# Patient Record
Sex: FEMALE | Race: WHITE | NOT HISPANIC OR LATINO | Employment: OTHER | URBAN - NONMETROPOLITAN AREA
[De-identification: names, ages, dates, MRNs, and addresses within clinical notes are randomized per-mention and may not be internally consistent; named-entity substitution may affect disease eponyms.]

---

## 2017-10-25 ENCOUNTER — TRANSCRIBE ORDERS (OUTPATIENT)
Dept: ADMINISTRATIVE | Facility: HOSPITAL | Age: 42
End: 2017-10-25

## 2017-10-25 DIAGNOSIS — Z12.31 VISIT FOR SCREENING MAMMOGRAM: Primary | ICD-10-CM

## 2017-11-09 ENCOUNTER — HOSPITAL ENCOUNTER (OUTPATIENT)
Dept: MAMMOGRAPHY | Facility: HOSPITAL | Age: 42
Discharge: HOME | End: 2017-11-09
Attending: FAMILY MEDICINE

## 2017-12-01 ENCOUNTER — HOSPITAL ENCOUNTER (OUTPATIENT)
Dept: MAMMOGRAPHY | Facility: HOSPITAL | Age: 42
Discharge: HOME | End: 2017-12-01
Admitting: FAMILY MEDICINE

## 2017-12-01 DIAGNOSIS — Z12.31 VISIT FOR SCREENING MAMMOGRAM: ICD-10-CM

## 2017-12-01 PROCEDURE — 77063 BREAST TOMOSYNTHESIS BI: CPT | Performed by: RADIOLOGY

## 2017-12-01 PROCEDURE — 77063 BREAST TOMOSYNTHESIS BI: CPT

## 2017-12-01 PROCEDURE — G0202 SCR MAMMO BI INCL CAD: HCPCS

## 2017-12-01 PROCEDURE — G0202 SCR MAMMO BI INCL CAD: HCPCS | Performed by: RADIOLOGY

## 2018-04-25 ENCOUNTER — TRANSCRIBE ORDERS (OUTPATIENT)
Dept: ADMINISTRATIVE | Facility: HOSPITAL | Age: 43
End: 2018-04-25

## 2018-04-25 DIAGNOSIS — M54.50 ACUTE LEFT-SIDED LOW BACK PAIN WITHOUT SCIATICA: Primary | ICD-10-CM

## 2018-07-05 ENCOUNTER — TRANSCRIBE ORDERS (OUTPATIENT)
Dept: ADMINISTRATIVE | Facility: HOSPITAL | Age: 43
End: 2018-07-05

## 2018-07-05 DIAGNOSIS — R10.84 ABDOMINAL PAIN, GENERALIZED: Primary | ICD-10-CM

## 2018-07-13 ENCOUNTER — HOSPITAL ENCOUNTER (OUTPATIENT)
Dept: CT IMAGING | Facility: HOSPITAL | Age: 43
Discharge: HOME | End: 2018-07-13
Admitting: NURSE PRACTITIONER

## 2018-07-13 DIAGNOSIS — R10.84 ABDOMINAL PAIN, GENERALIZED: ICD-10-CM

## 2018-07-13 PROCEDURE — 74170 CT ABD WO CNTRST FLWD CNTRST: CPT

## 2018-07-13 PROCEDURE — 25010000002 IOPAMIDOL 61 % SOLUTION: Performed by: NURSE PRACTITIONER

## 2018-07-13 PROCEDURE — 74170 CT ABD WO CNTRST FLWD CNTRST: CPT | Performed by: RADIOLOGY

## 2018-07-13 RX ADMIN — IOPAMIDOL 100 ML: 612 INJECTION, SOLUTION INTRAVENOUS at 14:45

## 2018-12-12 ENCOUNTER — HOSPITAL ENCOUNTER (OUTPATIENT)
Dept: MAMMOGRAPHY | Facility: HOSPITAL | Age: 43
Discharge: HOME | End: 2018-12-12
Admitting: FAMILY MEDICINE

## 2018-12-12 DIAGNOSIS — Z12.39 SCREENING BREAST EXAMINATION: ICD-10-CM

## 2018-12-12 PROCEDURE — 77063 BREAST TOMOSYNTHESIS BI: CPT | Performed by: RADIOLOGY

## 2018-12-12 PROCEDURE — 77067 SCR MAMMO BI INCL CAD: CPT | Performed by: RADIOLOGY

## 2018-12-12 PROCEDURE — 77063 BREAST TOMOSYNTHESIS BI: CPT

## 2018-12-12 PROCEDURE — 77067 SCR MAMMO BI INCL CAD: CPT

## 2019-11-14 ENCOUNTER — TRANSCRIBE ORDERS (OUTPATIENT)
Dept: ADMINISTRATIVE | Facility: HOSPITAL | Age: 44
End: 2019-11-14

## 2019-11-14 DIAGNOSIS — R07.9 CHEST PAIN, UNSPECIFIED TYPE: Primary | ICD-10-CM

## 2019-11-19 ENCOUNTER — HOSPITAL ENCOUNTER (OUTPATIENT)
Dept: CARDIOLOGY | Facility: HOSPITAL | Age: 44
Discharge: HOME | End: 2019-11-19
Admitting: FAMILY MEDICINE

## 2019-11-19 DIAGNOSIS — R07.9 CHEST PAIN, UNSPECIFIED TYPE: ICD-10-CM

## 2019-11-19 LAB
BH CV ECHO MEAS - % IVS THICK: 1.7 %
BH CV ECHO MEAS - % LVPW THICK: -0.53 %
BH CV ECHO MEAS - ACS: 1.8 CM
BH CV ECHO MEAS - AO MAX PG: 7.4 MMHG
BH CV ECHO MEAS - AO MEAN PG: 3.5 MMHG
BH CV ECHO MEAS - AO ROOT AREA (BSA CORRECTED): 1.6
BH CV ECHO MEAS - AO ROOT AREA: 7.7 CM^2
BH CV ECHO MEAS - AO ROOT DIAM: 3.1 CM
BH CV ECHO MEAS - AO V2 MAX: 135.7 CM/SEC
BH CV ECHO MEAS - AO V2 MEAN: 86.4 CM/SEC
BH CV ECHO MEAS - AO V2 VTI: 31 CM
BH CV ECHO MEAS - BSA(HAYCOCK): 2 M^2
BH CV ECHO MEAS - BSA: 1.9 M^2
BH CV ECHO MEAS - BZI_BMI: 29.1 KILOGRAMS/M^2
BH CV ECHO MEAS - BZI_METRIC_HEIGHT: 167.6 CM
BH CV ECHO MEAS - BZI_METRIC_WEIGHT: 81.6 KG
BH CV ECHO MEAS - EDV(CUBED): 42.3 ML
BH CV ECHO MEAS - EDV(MOD-SP4): 58 ML
BH CV ECHO MEAS - EDV(TEICH): 50.3 ML
BH CV ECHO MEAS - EF(CUBED): 67.8 %
BH CV ECHO MEAS - EF(MOD-SP4): 65.5 %
BH CV ECHO MEAS - EF(TEICH): 60.4 %
BH CV ECHO MEAS - ESV(CUBED): 13.6 ML
BH CV ECHO MEAS - ESV(MOD-SP4): 20 ML
BH CV ECHO MEAS - ESV(TEICH): 19.9 ML
BH CV ECHO MEAS - FS: 31.5 %
BH CV ECHO MEAS - IVS/LVPW: 0.95
BH CV ECHO MEAS - IVSD: 1.1 CM
BH CV ECHO MEAS - IVSS: 1.1 CM
BH CV ECHO MEAS - LA DIMENSION: 3 CM
BH CV ECHO MEAS - LA/AO: 0.96
BH CV ECHO MEAS - LV DIASTOLIC VOL/BSA (35-75): 30.3 ML/M^2
BH CV ECHO MEAS - LV MASS(C)D: 116 GRAMS
BH CV ECHO MEAS - LV MASS(C)DI: 60.6 GRAMS/M^2
BH CV ECHO MEAS - LV MASS(C)S: 68.8 GRAMS
BH CV ECHO MEAS - LV MASS(C)SI: 36 GRAMS/M^2
BH CV ECHO MEAS - LV SYSTOLIC VOL/BSA (12-30): 10.5 ML/M^2
BH CV ECHO MEAS - LVIDD: 3.5 CM
BH CV ECHO MEAS - LVIDS: 2.4 CM
BH CV ECHO MEAS - LVLD AP4: 6.5 CM
BH CV ECHO MEAS - LVLS AP4: 4.9 CM
BH CV ECHO MEAS - LVOT AREA (M): 2.5 CM^2
BH CV ECHO MEAS - LVOT AREA: 2.5 CM^2
BH CV ECHO MEAS - LVOT DIAM: 1.8 CM
BH CV ECHO MEAS - LVPWD: 1.1 CM
BH CV ECHO MEAS - LVPWS: 1.1 CM
BH CV ECHO MEAS - MV A MAX VEL: 65.6 CM/SEC
BH CV ECHO MEAS - MV E MAX VEL: 86.9 CM/SEC
BH CV ECHO MEAS - MV E/A: 1.3
BH CV ECHO MEAS - PA ACC SLOPE: 1665 CM/SEC^2
BH CV ECHO MEAS - PA ACC TIME: 0.09 SEC
BH CV ECHO MEAS - PA PR(ACCEL): 39.4 MMHG
BH CV ECHO MEAS - RAP SYSTOLE: 10 MMHG
BH CV ECHO MEAS - RVSP: 41.9 MMHG
BH CV ECHO MEAS - SI(AO): 125.7 ML/M^2
BH CV ECHO MEAS - SI(CUBED): 15 ML/M^2
BH CV ECHO MEAS - SI(MOD-SP4): 19.9 ML/M^2
BH CV ECHO MEAS - SI(TEICH): 15.9 ML/M^2
BH CV ECHO MEAS - SV(AO): 240.4 ML
BH CV ECHO MEAS - SV(CUBED): 28.7 ML
BH CV ECHO MEAS - SV(MOD-SP4): 38 ML
BH CV ECHO MEAS - SV(TEICH): 30.4 ML
BH CV ECHO MEAS - TR MAX VEL: 282.3 CM/SEC
MAXIMAL PREDICTED HEART RATE: 177 BPM
STRESS TARGET HR: 150 BPM

## 2019-11-19 PROCEDURE — 93306 TTE W/DOPPLER COMPLETE: CPT

## 2019-11-19 PROCEDURE — 93306 TTE W/DOPPLER COMPLETE: CPT | Performed by: INTERNAL MEDICINE

## 2019-11-21 ENCOUNTER — APPOINTMENT (OUTPATIENT)
Dept: CARDIOLOGY | Facility: HOSPITAL | Age: 44
End: 2019-11-21

## 2019-12-16 ENCOUNTER — HOSPITAL ENCOUNTER (OUTPATIENT)
Dept: MAMMOGRAPHY | Facility: HOSPITAL | Age: 44
Discharge: HOME | End: 2019-12-16
Admitting: FAMILY MEDICINE

## 2019-12-16 DIAGNOSIS — Z12.31 VISIT FOR SCREENING MAMMOGRAM: ICD-10-CM

## 2019-12-16 PROCEDURE — 77067 SCR MAMMO BI INCL CAD: CPT

## 2019-12-16 PROCEDURE — 77063 BREAST TOMOSYNTHESIS BI: CPT

## 2019-12-16 PROCEDURE — 77063 BREAST TOMOSYNTHESIS BI: CPT | Performed by: RADIOLOGY

## 2019-12-16 PROCEDURE — 77067 SCR MAMMO BI INCL CAD: CPT | Performed by: RADIOLOGY

## 2020-02-07 ENCOUNTER — OFFICE VISIT (OUTPATIENT)
Dept: SURGERY | Facility: CLINIC | Age: 45
End: 2020-02-07

## 2020-02-07 VITALS
SYSTOLIC BLOOD PRESSURE: 132 MMHG | WEIGHT: 189.6 LBS | BODY MASS INDEX: 30.47 KG/M2 | DIASTOLIC BLOOD PRESSURE: 103 MMHG | HEART RATE: 89 BPM | HEIGHT: 66 IN

## 2020-02-07 DIAGNOSIS — M79.621 AXILLARY PAIN, RIGHT: Primary | ICD-10-CM

## 2020-02-07 PROCEDURE — 76882 US LMTD JT/FCL EVL NVASC XTR: CPT | Performed by: SURGERY

## 2020-02-07 PROCEDURE — 99203 OFFICE O/P NEW LOW 30 MIN: CPT | Performed by: SURGERY

## 2020-02-07 RX ORDER — DICLOFENAC SODIUM 75 MG/1
TABLET, DELAYED RELEASE ORAL 2 TIMES DAILY
COMMUNITY
Start: 2019-11-26 | End: 2021-04-30

## 2020-02-07 RX ORDER — TRAMADOL HYDROCHLORIDE 50 MG/1
TABLET ORAL
COMMUNITY
Start: 2019-11-27 | End: 2021-05-18

## 2020-02-07 RX ORDER — BUSPIRONE HYDROCHLORIDE 5 MG/1
TABLET ORAL
COMMUNITY
Start: 2019-09-06 | End: 2021-04-30

## 2020-02-07 RX ORDER — DICYCLOMINE HCL 20 MG
TABLET ORAL 2 TIMES DAILY
COMMUNITY
Start: 2020-01-21 | End: 2021-12-28 | Stop reason: SDUPTHER

## 2020-02-07 RX ORDER — ALPRAZOLAM 1 MG/1
TABLET ORAL
COMMUNITY
Start: 2020-01-13 | End: 2021-04-30

## 2020-02-07 RX ORDER — GABAPENTIN 100 MG/1
100 CAPSULE ORAL 2 TIMES DAILY
COMMUNITY
Start: 2019-12-27 | End: 2021-04-30

## 2020-02-07 RX ORDER — BUSPIRONE HYDROCHLORIDE 5 MG/1
TABLET ORAL
COMMUNITY
Start: 2019-09-06 | End: 2020-02-07 | Stop reason: SDUPTHER

## 2020-02-07 RX ORDER — OMEPRAZOLE 20 MG/1
CAPSULE, DELAYED RELEASE ORAL
COMMUNITY
Start: 2020-01-22 | End: 2020-09-24

## 2020-02-07 NOTE — PROGRESS NOTES
Subjective   Iraida Heard is a 44 y.o. female is being seen for consultation today at the request of Dr. Bustamante for chest and arm pit pain.    History of Present Illness  Ms. Heard was seen in the office today for right axillary pain.  The patient states that this started in October.  She states that it is severe enough that she sleeps with a pillow under her axilla.  The pain occurs daily but it is not continuous.  She has not identified any trigger factors for the pain.  Specifically it is not related to movement.  She does not appreciate any axillary adenopathy.  She denies any injury to her shoulder.  Iraida had a screening mammogram bilateral on 12/16/2019 which was negative.  Patient reports no prior history of breast biopsy or cyst aspiration.  As far as risk factors go, Iraida is nulliparous, with an onset of menses at age 15.  Family history is positive for breast cancer in a paternal aunt in her 60s.  The patient had a hysterectomy but has 1 ovary remaining.  She is not on hormone replacement therapy.    No Known Allergies  Current Outpatient Medications   Medication Sig Dispense Refill   • ALPRAZolam (XANAX) 1 MG tablet TAKE 1 TABLET BY MOUTH AT NIGHT     • busPIRone (BUSPAR) 5 MG tablet TAKE 1 TABLET BY MOUTH THREE (3) TIMES DAILY     • dicyclomine (BENTYL) 20 MG tablet Take  by mouth Every 8 (Eight) Hours.     • gabapentin (NEURONTIN) 100 MG capsule take 1 capsule by oral route am and 2tab pm     • traMADol (ULTRAM) 50 MG tablet take 1 tablet by oral route every night as needed     • diclofenac (VOLTAREN) 75 MG EC tablet      • omeprazole (priLOSEC) 20 MG capsule        No current facility-administered medications for this visit.      Past Medical History:   Diagnosis Date   • Arthritis      Past Surgical History:   Procedure Laterality Date   • EXCISION BENIGN SKIN LESION SCALP / NECK / HANDS / FEET / GENITALIA     • GALLBLADDER SURGERY     • HYSTERECTOMY      36   • SINUS SURGERY       Review of  "Systems  General: negative  Integumentary: negative  Eyes: negative  ENT: negative  Respiratory: negative  Gastrointestinal: negative  Cardiovascular: chest pain  Neurological: negative  Psychiatric: negative  Hematologic/Lymphatic: swollen glands  Genitourinary: negative  Musculoskeletal: negative  Endocrine: negative  Breasts: negative        Objective   BP (!) 132/103 (BP Location: Left arm)   Pulse 89   Ht 167.6 cm (66\")   Wt 86 kg (189 lb 9.6 oz)   BMI 30.60 kg/m²   Physical Exam  General:  This is a WD WN female in no acute distress  HEENT exam:  WNL. Sclera are anicteric.  EOMI  Neck:  supple, FROM, without thyromegaly, cervical or supraclavicular adenopathy  Lungs:  Respiratory effort normal. Auscultation: Clear, without wheezes, rhonchi, rales  Heart:  Regular rate and rhythm, without murmur, gallop, rub.  No pedal edema  Breasts: On visual inspection the breasts are grossly symmetrical. Examination of the right breast demonstrates no discrete mass or skin change. Examination of the left breast demonstrates no discrete mass or skin change.  Axilla: Right axilla negative for adenopathy.  Left axilla negative for adenopathy.  Abdomen: Nontender, nondistended  Musculoskeletal:  muscle strength/tone is normal.  Gait and station: normal. No digital cyanosis  Psyc:  alert, oriented x 3.  Mood and affect are appropriate  skin:  Warm with good turgor.  Without rash or lesion  extremities:  Examination of the extremities revealed no cyanosis, clubbing or edema.  Results/Data  Mammogram reports and images were reviewed and I agree with the assessment    Procedures   Ultrasound Axilla right    Indication: Axillary pain  Description: With use of the 12.5 MHz transducer the area of clinical concern was scanned in multiple planes.  Findings: The right axilla was scanned in its entirety.  Normal lymph nodes are identified.  In the area of clinical concern patient does have some fibrocystic tissue within the axillary " tail of Adams.  However this is not any more prominent than the patient's fibrocystic tissue throughout the breast.  Impression: Negative ultrasound of the right axilla  Plan: Follow-up as clinically indicated    Assessment/Plan   Right axillary pain with no clinical or radiographic findings suspicious for malignancy    Follow-up as needed       Discussion/Summary    Patient's Body mass index is 30.6 kg/m². BMI is above normal parameters. Recommendations include: educational material.       No future appointments.      Please note that portions of this note were completed with a voice recognition program.

## 2020-07-14 ENCOUNTER — TRANSCRIBE ORDERS (OUTPATIENT)
Dept: ADMINISTRATIVE | Facility: HOSPITAL | Age: 45
End: 2020-07-14

## 2020-07-14 DIAGNOSIS — Z01.818 OTHER SPECIFIED PRE-OPERATIVE EXAMINATION: Primary | ICD-10-CM

## 2020-08-10 ENCOUNTER — TRANSCRIBE ORDERS (OUTPATIENT)
Dept: ADMINISTRATIVE | Facility: HOSPITAL | Age: 45
End: 2020-08-10

## 2020-08-10 DIAGNOSIS — Z01.818 PREOP EXAMINATION: Primary | ICD-10-CM

## 2020-08-11 ENCOUNTER — LAB (OUTPATIENT)
Dept: LAB | Facility: HOSPITAL | Age: 45
End: 2020-08-11

## 2020-08-11 DIAGNOSIS — Z01.818 PREOP EXAMINATION: ICD-10-CM

## 2020-08-11 LAB
REF LAB TEST METHOD: NORMAL
SARS-COV-2 RNA RESP QL NAA+PROBE: NOT DETECTED

## 2020-08-11 PROCEDURE — U0004 COV-19 TEST NON-CDC HGH THRU: HCPCS

## 2020-08-11 PROCEDURE — C9803 HOPD COVID-19 SPEC COLLECT: HCPCS

## 2020-08-11 PROCEDURE — U0002 COVID-19 LAB TEST NON-CDC: HCPCS

## 2020-09-24 ENCOUNTER — OFFICE VISIT (OUTPATIENT)
Dept: UROLOGY | Facility: CLINIC | Age: 45
End: 2020-09-24

## 2020-09-24 VITALS — WEIGHT: 191.4 LBS | HEIGHT: 66 IN | TEMPERATURE: 98.3 F | BODY MASS INDEX: 30.76 KG/M2

## 2020-09-24 DIAGNOSIS — R35.0 FREQUENCY OF URINATION: Primary | ICD-10-CM

## 2020-09-24 LAB
BILIRUB BLD-MCNC: NEGATIVE MG/DL
CLARITY, POC: CLEAR
COLOR UR: YELLOW
GLUCOSE UR STRIP-MCNC: NEGATIVE MG/DL
KETONES UR QL: NEGATIVE
LEUKOCYTE EST, POC: NEGATIVE
NITRITE UR-MCNC: NEGATIVE MG/ML
PH UR: 5.5 [PH] (ref 5–8)
PROT UR STRIP-MCNC: NEGATIVE MG/DL
RBC # UR STRIP: NEGATIVE /UL
SP GR UR: 1.01 (ref 1–1.03)
UROBILINOGEN UR QL: NORMAL

## 2020-09-24 PROCEDURE — 99201 PR OFFICE OUTPATIENT NEW 10 MINUTES: CPT | Performed by: UROLOGY

## 2020-09-24 PROCEDURE — 81003 URINALYSIS AUTO W/O SCOPE: CPT | Performed by: UROLOGY

## 2020-09-24 NOTE — PROGRESS NOTES
Chief Complaint:          Urinary cyst.    HPI:   45 y.o. female.    HPI  Pt voids every half hour.  She has to void 4 to 5 times before she goes to sleep.  She does not wake up to void after she falls to sleep.  Pt drinks a lot of water.    Past Medical History:        Past Medical History:   Diagnosis Date   • Arthritis          Current Meds:     Current Outpatient Medications   Medication Sig Dispense Refill   • ALPRAZolam (XANAX) 1 MG tablet TAKE 1 TABLET BY MOUTH AT NIGHT     • busPIRone (BUSPAR) 5 MG tablet TAKE 1 TABLET BY MOUTH THREE (3) TIMES DAILY     • diclofenac (VOLTAREN) 75 MG EC tablet      • dicyclomine (BENTYL) 20 MG tablet Take  by mouth Every 8 (Eight) Hours.     • gabapentin (NEURONTIN) 100 MG capsule take 1 capsule by oral route am and 2tab pm     • traMADol (ULTRAM) 50 MG tablet take 1 tablet by oral route every night as needed       No current facility-administered medications for this visit.         Allergies:      No Known Allergies     Past Surgical History:     Past Surgical History:   Procedure Laterality Date   • APPENDECTOMY     • EXCISION BENIGN SKIN LESION SCALP / NECK / HANDS / FEET / GENITALIA     • GALLBLADDER SURGERY     • HYSTERECTOMY      36   • SINUS SURGERY           Social History:     Social History     Socioeconomic History   • Marital status:      Spouse name: Not on file   • Number of children: Not on file   • Years of education: Not on file   • Highest education level: Not on file   Tobacco Use   • Smoking status: Never Smoker   • Smokeless tobacco: Never Used   Substance and Sexual Activity   • Alcohol use: Never     Frequency: Never   • Drug use: Never   • Sexual activity: Defer       Family History:     Family History   Problem Relation Age of Onset   • Breast cancer Maternal Grandmother         60's   • Cancer Mother         kidney   • Diabetes Mother    • Hypertension Father    • Heart disease Father        Review of Systems:     Review of Systems      Constitutional: Positive for fatigue. Negative for chills and fever.   HENT: Positive for sinus pressure. Negative for congestion.    Eyes: Negative for pain and redness.   Respiratory: Positive for chest tightness. Negative for shortness of breath.    Cardiovascular: Negative for chest pain.   Gastrointestinal: Positive for abdominal pain and diarrhea. Negative for constipation, nausea and vomiting.   Genitourinary: Positive for flank pain and frequency. Negative for dysuria, hematuria and urgency.   Musculoskeletal: Positive for back pain. Negative for neck pain.   Skin: Negative for rash.   Allergic/Immunologic: Negative for food allergies.   Neurological: Negative for dizziness and headaches.   Hematological: Does not bruise/bleed easily.   Psychiatric/Behavioral: The patient is nervous/anxious.          Physical Exam:     Physical Exam  Constitutional:       Appearance: She is well-developed.   HENT:      Head: Normocephalic.      Right Ear: External ear normal.      Left Ear: External ear normal.      Nose: Nose normal.   Eyes:      Conjunctiva/sclera: Conjunctivae normal.      Pupils: Pupils are equal, round, and reactive to light.   Neck:      Musculoskeletal: Normal range of motion and neck supple.      Thyroid: No thyromegaly.      Trachea: No tracheal deviation.   Cardiovascular:      Heart sounds: Normal heart sounds. No murmur. No friction rub. No gallop.    Pulmonary:      Effort: Pulmonary effort is normal. No respiratory distress.      Breath sounds: Normal breath sounds. No wheezing or rales.   Chest:      Chest wall: No tenderness.   Abdominal:      General: Bowel sounds are normal. There is no distension.      Palpations: Abdomen is soft. There is no mass.      Tenderness: There is no abdominal tenderness. There is no guarding or rebound.      Hernia: No hernia is present.   Genitourinary:     Vagina: Normal.   Musculoskeletal: Normal range of motion.   Lymphadenopathy:      Cervical: No  "cervical adenopathy.   Skin:     General: Skin is warm.      Findings: No rash.   Neurological:      Mental Status: She is alert and oriented to person, place, and time.      Cranial Nerves: No cranial nerve deficit.      Motor: No abnormal muscle tone.      Coordination: Coordination normal.      Deep Tendon Reflexes: Reflexes normal.   Psychiatric:         Judgment: Judgment normal.         Temp 98.3 °F (36.8 °C)   Ht 167.6 cm (66\")   Wt 86.8 kg (191 lb 6.4 oz)   BMI 30.89 kg/m²    Procedure:         Assessment:     Encounter Diagnosis   Name Primary?   • Frequency of urination Yes       Orders Placed This Encounter   Procedures   • POC Urinalysis Dipstick, Automated       Patient reports that she is not currently experiencing any symptoms of urinary incontinence.      Plan:   Will have pt return with a voiding diary of at least 6 days.  Will see her in 6 weeks.    Patient's Body mass index is 30.89 kg/m². BMI is within normal parameters. No follow-up required..      Smoking Cessation Counseling:  Never a smoker.  Patient does not currently use any tobacco products.     Counseling was given to patient for the following topics impressions as follows: frequency. The interim medical history and current results were reviewed.  A treatment plan with follow-up was made for Frequency of urination [R35.0].   This document has been electronically signed by Anthony Sandoval MD September 24, 2020 14:49 EDT      "

## 2020-10-06 ENCOUNTER — OFFICE VISIT (OUTPATIENT)
Dept: UROLOGY | Facility: CLINIC | Age: 45
End: 2020-10-06

## 2020-10-06 VITALS — TEMPERATURE: 98.6 F | WEIGHT: 191.36 LBS | HEIGHT: 66 IN | BODY MASS INDEX: 30.75 KG/M2

## 2020-10-06 DIAGNOSIS — R35.0 FREQUENCY OF URINATION: Primary | ICD-10-CM

## 2020-10-06 DIAGNOSIS — R35.89 POLYURIA: ICD-10-CM

## 2020-10-06 LAB
BILIRUB BLD-MCNC: NEGATIVE MG/DL
CLARITY, POC: CLEAR
COLOR UR: YELLOW
GLUCOSE UR STRIP-MCNC: NEGATIVE MG/DL
KETONES UR QL: NEGATIVE
LEUKOCYTE EST, POC: NEGATIVE
NITRITE UR-MCNC: NEGATIVE MG/ML
PH UR: 5 [PH] (ref 5–8)
PROT UR STRIP-MCNC: NEGATIVE MG/DL
RBC # UR STRIP: NEGATIVE /UL
SP GR UR: 1.01 (ref 1–1.03)
UROBILINOGEN UR QL: NORMAL

## 2020-10-06 PROCEDURE — 81003 URINALYSIS AUTO W/O SCOPE: CPT | Performed by: UROLOGY

## 2020-10-06 PROCEDURE — 99214 OFFICE O/P EST MOD 30 MIN: CPT | Performed by: UROLOGY

## 2020-10-06 NOTE — PROGRESS NOTES
Chief Complaint:          frequency    HPI:   45 y.o. female.  Pt voided usually 300 cc to 400 cc a time and she made 4600 cc to 2950 cc daily.  She has normal bladder capacity but she has excessive polyuria.  HPI      Past Medical History:        Past Medical History:   Diagnosis Date   • Arthritis          Current Meds:     Current Outpatient Medications   Medication Sig Dispense Refill   • ALPRAZolam (XANAX) 1 MG tablet TAKE 1 TABLET BY MOUTH AT NIGHT     • busPIRone (BUSPAR) 5 MG tablet TAKE 1 TABLET BY MOUTH THREE (3) TIMES DAILY     • diclofenac (VOLTAREN) 75 MG EC tablet      • dicyclomine (BENTYL) 20 MG tablet Take  by mouth Every 8 (Eight) Hours.     • gabapentin (NEURONTIN) 100 MG capsule take 1 capsule by oral route am and 2tab pm     • traMADol (ULTRAM) 50 MG tablet take 1 tablet by oral route every night as needed       No current facility-administered medications for this visit.         Allergies:      No Known Allergies     Past Surgical History:     Past Surgical History:   Procedure Laterality Date   • APPENDECTOMY     • EXCISION BENIGN SKIN LESION SCALP / NECK / HANDS / FEET / GENITALIA     • GALLBLADDER SURGERY     • HYSTERECTOMY      36   • SINUS SURGERY           Social History:     Social History     Socioeconomic History   • Marital status:      Spouse name: Not on file   • Number of children: Not on file   • Years of education: Not on file   • Highest education level: Not on file   Tobacco Use   • Smoking status: Never Smoker   • Smokeless tobacco: Never Used   Substance and Sexual Activity   • Alcohol use: Never     Frequency: Never   • Drug use: Never   • Sexual activity: Defer       Family History:     Family History   Problem Relation Age of Onset   • Breast cancer Maternal Grandmother         60's   • Cancer Mother         kidney   • Diabetes Mother    • Hypertension Father    • Heart disease Father        Review of Systems:     Review of Systems   HENT: Negative for ear discharge  "and rhinorrhea.    Respiratory: Negative for choking and stridor.    Cardiovascular: Negative for chest pain and palpitations.   Gastrointestinal: Negative for abdominal distention and constipation.   Genitourinary: Negative for flank pain and pelvic pain.   Musculoskeletal: Negative for back pain.   Neurological: Negative for dizziness.   Psychiatric/Behavioral: Negative for agitation.             I have reviewed the follow portions of the patient's history and confirmed they are accurate today:  allergies, current medications, past family history, past medical history, past social history, past surgical history, problem list and ROS  Physical Exam:     Physical Exam    Temp 98.6 °F (37 °C)   Ht 167.6 cm (65.98\")   Wt 86.8 kg (191 lb 5.8 oz)   BMI 30.90 kg/m²    Procedure:         Assessment:     Encounter Diagnoses   Name Primary?   • Frequency of urination Yes   • Polyuria        Orders Placed This Encounter   Procedures   • US Renal Bilateral     Standing Status:   Future     Standing Expiration Date:   10/6/2021     Order Specific Question:   Reason for Exam:     Answer:   polyuria of 4.6 liters a day.   • Comprehensive Metabolic Panel   • POC Urinalysis Dipstick, Automated       Patient reports that she is not currently experiencing any symptoms of urinary incontinence.      Plan:   Will refer pt to nephrologist for possible diabetes insipidus or psychogenic polydipsia or nephrogenic diabetes insipidus.  Pt has a 24 hour urinary volume of 3000 cc to 4600 cc over multiple days.   Will have pt limit water intake to 1 liter a day and keep records.  Will see her back in a month.  Will also check a renal ultrasound and cmp      Patient's Body mass index is 30.9 kg/m². BMI is within normal parameters. No follow-up required..      Smoking Cessation Counseling:  Never a smoker.  Patient does not currently use any tobacco products.     Counseling was given to patient for the following topics instructions for " management as follows: fluid restiction test.. The interim medical history and current results were reviewed.  A treatment plan with follow-up was made for Frequency of urination [R35.0]. I spent 28 minutes face to face with Iraida Heard and 80 percentage was spent in counseling.       This document has been electronically signed by Anthony Sandoval MD October 7, 2020 10:03 EDT

## 2020-10-13 ENCOUNTER — HOSPITAL ENCOUNTER (OUTPATIENT)
Dept: ULTRASOUND IMAGING | Facility: HOSPITAL | Age: 45
Discharge: HOME | End: 2020-10-13
Admitting: UROLOGY

## 2020-10-13 DIAGNOSIS — R35.89 POLYURIA: ICD-10-CM

## 2020-10-13 PROCEDURE — 76775 US EXAM ABDO BACK WALL LIM: CPT

## 2020-10-13 PROCEDURE — 76775 US EXAM ABDO BACK WALL LIM: CPT | Performed by: RADIOLOGY

## 2020-10-14 ENCOUNTER — OFFICE VISIT (OUTPATIENT)
Dept: UROLOGY | Facility: CLINIC | Age: 45
End: 2020-10-14

## 2020-10-14 VITALS — BODY MASS INDEX: 30.95 KG/M2 | WEIGHT: 192.6 LBS | TEMPERATURE: 99.7 F | HEIGHT: 66 IN

## 2020-10-14 DIAGNOSIS — R35.89 POLYURIA: Primary | ICD-10-CM

## 2020-10-14 PROCEDURE — 99213 OFFICE O/P EST LOW 20 MIN: CPT | Performed by: UROLOGY

## 2020-10-14 NOTE — PROGRESS NOTES
Chief Complaint:          frequency    HPI:   45 y.o. female.  Pt has decreased her frequency and he amount of urine to usually between 1 to 2.5 liters a day.  HPI  Pt's ultrasound was rambo.    Past Medical History:        Past Medical History:   Diagnosis Date   • Arthritis          Current Meds:     Current Outpatient Medications   Medication Sig Dispense Refill   • ALPRAZolam (XANAX) 1 MG tablet TAKE 1 TABLET BY MOUTH AT NIGHT     • busPIRone (BUSPAR) 5 MG tablet TAKE 1 TABLET BY MOUTH THREE (3) TIMES DAILY     • diclofenac (VOLTAREN) 75 MG EC tablet      • dicyclomine (BENTYL) 20 MG tablet Take  by mouth Every 8 (Eight) Hours.     • gabapentin (NEURONTIN) 100 MG capsule take 1 capsule by oral route am and 2tab pm     • traMADol (ULTRAM) 50 MG tablet take 1 tablet by oral route every night as needed       No current facility-administered medications for this visit.         Allergies:      No Known Allergies     Past Surgical History:     Past Surgical History:   Procedure Laterality Date   • APPENDECTOMY     • EXCISION BENIGN SKIN LESION SCALP / NECK / HANDS / FEET / GENITALIA     • GALLBLADDER SURGERY     • HYSTERECTOMY      36   • SINUS SURGERY           Social History:     Social History     Socioeconomic History   • Marital status:      Spouse name: Not on file   • Number of children: Not on file   • Years of education: Not on file   • Highest education level: Not on file   Tobacco Use   • Smoking status: Never Smoker   • Smokeless tobacco: Never Used   Substance and Sexual Activity   • Alcohol use: Never     Frequency: Never   • Drug use: Never   • Sexual activity: Defer       Family History:     Family History   Problem Relation Age of Onset   • Breast cancer Maternal Grandmother         60's   • Cancer Mother         kidney   • Diabetes Mother    • Hypertension Father    • Heart disease Father        Review of Systems:     Review of Systems   Constitutional: Positive for fatigue. Negative for  chills and fever.   HENT: Negative for congestion and sinus pressure.    Respiratory: Negative for chest tightness and shortness of breath.    Cardiovascular: Negative for chest pain.   Gastrointestinal: Negative for abdominal pain, constipation, diarrhea, nausea and vomiting.   Genitourinary: Positive for frequency. Negative for dysuria, hematuria and urgency.   Musculoskeletal: Negative for back pain and neck pain.   Neurological: Negative for dizziness and headaches.   Hematological: Does not bruise/bleed easily.   Psychiatric/Behavioral: The patient is not nervous/anxious.          Physical Exam:     Physical Exam  Constitutional:       Appearance: She is well-developed.   HENT:      Head: Normocephalic.      Right Ear: External ear normal.      Left Ear: External ear normal.      Nose: Nose normal.   Eyes:      Conjunctiva/sclera: Conjunctivae normal.      Pupils: Pupils are equal, round, and reactive to light.   Neck:      Musculoskeletal: Normal range of motion and neck supple.      Thyroid: No thyromegaly.      Trachea: No tracheal deviation.   Cardiovascular:      Heart sounds: Normal heart sounds. No murmur. No friction rub. No gallop.    Pulmonary:      Effort: Pulmonary effort is normal. No respiratory distress.      Breath sounds: Normal breath sounds. No wheezing or rales.   Chest:      Chest wall: No tenderness.   Abdominal:      General: Bowel sounds are normal. There is no distension.      Palpations: Abdomen is soft. There is no mass.      Tenderness: There is no abdominal tenderness. There is no guarding or rebound.      Hernia: No hernia is present.   Genitourinary:     Vagina: Normal.   Musculoskeletal: Normal range of motion.   Lymphadenopathy:      Cervical: No cervical adenopathy.   Skin:     General: Skin is warm.      Findings: No rash.   Neurological:      Mental Status: She is alert and oriented to person, place, and time.      Cranial Nerves: No cranial nerve deficit.      Motor: No  "abnormal muscle tone.      Coordination: Coordination normal.      Deep Tendon Reflexes: Reflexes normal.   Psychiatric:         Judgment: Judgment normal.         Temp 99.7 °F (37.6 °C)   Ht 167.6 cm (65.98\")   Wt 87.4 kg (192 lb 9.6 oz)   BMI 31.11 kg/m²    Procedure:         Assessment:     Encounter Diagnosis   Name Primary?   • Polyuria Yes       No orders of the defined types were placed in this encounter.      Patient reports that she is not currently experiencing any symptoms of urinary incontinence.      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  I would have her continue restricting water to 1 liter a day and will see pt back in 6 monts.    Patient's Body mass index is 31.11 kg/m². BMI is within normal parameters. No follow-up required..      Smoking Cessation Counseling:  Never a smoker.  Patient does not currently use any tobacco products.     Counseling was given to patient for the following topics instructions for management as follows: polyuria. The interim medical history and current results were reviewed.  A treatment plan with follow-up was made for Polyuria [R35.8]. I spent 21 minutes face to face with Iraida Heard and 80 percentage was spent in counseling.       This document has been electronically signed by Anthony Sandoval MD October 14, 2020 13:09 EDT      "

## 2021-01-22 ENCOUNTER — HOSPITAL ENCOUNTER (OUTPATIENT)
Dept: MAMMOGRAPHY | Facility: HOSPITAL | Age: 46
Discharge: HOME | End: 2021-01-22
Admitting: NURSE PRACTITIONER

## 2021-01-22 DIAGNOSIS — Z12.31 VISIT FOR SCREENING MAMMOGRAM: ICD-10-CM

## 2021-01-22 PROCEDURE — 77063 BREAST TOMOSYNTHESIS BI: CPT | Performed by: RADIOLOGY

## 2021-01-22 PROCEDURE — 77067 SCR MAMMO BI INCL CAD: CPT | Performed by: RADIOLOGY

## 2021-01-22 PROCEDURE — 77067 SCR MAMMO BI INCL CAD: CPT

## 2021-01-22 PROCEDURE — 77063 BREAST TOMOSYNTHESIS BI: CPT

## 2021-01-26 ENCOUNTER — HOSPITAL ENCOUNTER (OUTPATIENT)
Dept: ULTRASOUND IMAGING | Facility: HOSPITAL | Age: 46
Discharge: HOME | End: 2021-01-26
Admitting: RADIOLOGY

## 2021-01-26 DIAGNOSIS — R92.8 ABNORMAL MAMMOGRAM: ICD-10-CM

## 2021-01-26 PROCEDURE — 76642 ULTRASOUND BREAST LIMITED: CPT

## 2021-01-26 PROCEDURE — 76642 ULTRASOUND BREAST LIMITED: CPT | Performed by: RADIOLOGY

## 2021-02-09 ENCOUNTER — OFFICE VISIT (OUTPATIENT)
Dept: CARDIOLOGY | Facility: CLINIC | Age: 46
End: 2021-02-09

## 2021-02-09 VITALS
SYSTOLIC BLOOD PRESSURE: 128 MMHG | DIASTOLIC BLOOD PRESSURE: 90 MMHG | RESPIRATION RATE: 16 BRPM | TEMPERATURE: 97.1 F | HEIGHT: 66 IN | HEART RATE: 84 BPM | WEIGHT: 192.6 LBS | BODY MASS INDEX: 30.95 KG/M2

## 2021-02-09 DIAGNOSIS — R00.2 PALPITATIONS: ICD-10-CM

## 2021-02-09 DIAGNOSIS — I10 HYPERTENSION, UNSPECIFIED TYPE: ICD-10-CM

## 2021-02-09 DIAGNOSIS — R07.2 PRECORDIAL CHEST PAIN: Primary | ICD-10-CM

## 2021-02-09 DIAGNOSIS — G47.33 OSA (OBSTRUCTIVE SLEEP APNEA): ICD-10-CM

## 2021-02-09 PROCEDURE — 93000 ELECTROCARDIOGRAM COMPLETE: CPT | Performed by: SPECIALIST

## 2021-02-09 PROCEDURE — 99204 OFFICE O/P NEW MOD 45 MIN: CPT | Performed by: SPECIALIST

## 2021-02-09 RX ORDER — L.ACID,CASEI/B.ANIMAL/S.THERMO 16B CELL
1 CAPSULE ORAL DAILY
COMMUNITY
End: 2022-06-06

## 2021-02-09 RX ORDER — AMOXICILLIN 875 MG/1
875 TABLET, COATED ORAL 2 TIMES DAILY
COMMUNITY
End: 2021-02-23

## 2021-02-09 RX ORDER — OMEPRAZOLE 20 MG/1
20 CAPSULE, DELAYED RELEASE ORAL DAILY
COMMUNITY
End: 2021-05-06 | Stop reason: SDUPTHER

## 2021-02-09 RX ORDER — ESTRADIOL 1 MG/1
1 TABLET ORAL DAILY
COMMUNITY
End: 2021-04-30

## 2021-02-09 RX ORDER — LANOLIN ALCOHOL/MO/W.PET/CERES
1000 CREAM (GRAM) TOPICAL DAILY
COMMUNITY
End: 2021-05-18

## 2021-02-09 NOTE — PROGRESS NOTES
Subjective   Initial consultation, chest pain  Iraida eHard is a 45 y.o. female who presents to day for Chest Pain (rated 6-8, sharp), Shortness of Breath (routine activity), and Med Management (list provided).    CHIEF COMPLIANT  Chief Complaint   Patient presents with   • Chest Pain     rated 6-8, sharp   • Shortness of Breath     routine activity   • Med Management     list provided       Active Problems:  Problem List Items Addressed This Visit        Cardiac and Vasculature    Precordial chest pain - Primary    Palpitations    Hypertension       Sleep    KATALINA (obstructive sleep apnea)          HPI  HPI  For about a year, with intermittent retrosternal chest pain, on and off, sharp, not radiates, some times prolonged, no relations to worse with exertion, specially walking uphill, nothing else makes it worse, nothing makes it better,no associated symptoms, happens at least once a week, no shortness of breath, no edema, occasional episodes of palpitations, waking up, with fast heart beat and gasping for air, had sleep study done 2 days ago, result is not available, never smoke, no hypertension, no diabetes, her father had CABG and PCI, started in his 50`s  PRIOR MEDS  Current Outpatient Medications on File Prior to Visit   Medication Sig Dispense Refill   • ALPRAZolam (XANAX) 1 MG tablet TAKE 1 TABLET BY MOUTH AT NIGHT     • amoxicillin (AMOXIL) 875 MG tablet Take 875 mg by mouth 2 (Two) Times a Day.     • busPIRone (BUSPAR) 5 MG tablet TAKE 1 TABLET BY MOUTH THREE (3) TIMES DAILY     • Cranberry 125 MG tablet Take  by mouth Daily.     • diclofenac (VOLTAREN) 75 MG EC tablet      • dicyclomine (BENTYL) 20 MG tablet Take  by mouth Every 8 (Eight) Hours.     • estradiol (ESTRACE) 1 MG tablet Take 1 mg by mouth Daily.     • gabapentin (NEURONTIN) 100 MG capsule 100 mg 2 (two) times a day. Takes 1cap. A.m. 2 caps p.m.     • omeprazole (priLOSEC) 20 MG capsule Take 20 mg by mouth Daily.     • Probiotic Product  "(Risaquad-2) capsule capsule Take 1 capsule by mouth Daily.     • traMADol (ULTRAM) 50 MG tablet take 1 tablet by oral route every night as needed     • vitamin B-12 (CYANOCOBALAMIN) 1000 MCG tablet Take 1,000 mcg by mouth Daily.       No current facility-administered medications on file prior to visit.        ALLERGIES  Patient has no known allergies.    HISTORY  Past Medical History:   Diagnosis Date   • Arthritis    • Hypertension 2/9/2021       Social History     Socioeconomic History   • Marital status:      Spouse name: Not on file   • Number of children: Not on file   • Years of education: Not on file   • Highest education level: Not on file   Tobacco Use   • Smoking status: Never Smoker   • Smokeless tobacco: Never Used   Substance and Sexual Activity   • Alcohol use: Never     Frequency: Never   • Drug use: Never   • Sexual activity: Defer       Family History   Problem Relation Age of Onset   • Breast cancer Maternal Grandmother         60's   • Cancer Mother         kidney   • Diabetes Mother    • Hypertension Father    • Heart disease Father        Review of Systems   Constitutional: Negative for activity change and appetite change.   HENT: Positive for congestion.    Eyes: Negative for discharge and itching.   Respiratory: Positive for chest tightness. Negative for apnea, cough, choking, shortness of breath, wheezing and stridor.    Cardiovascular: Positive for chest pain. Negative for palpitations and leg swelling.   Gastrointestinal: Positive for constipation. Negative for abdominal pain and blood in stool.   Genitourinary: Positive for frequency.   Musculoskeletal: Positive for arthralgias and back pain.   Skin: Negative for color change and pallor.   Neurological: Positive for headaches.   Psychiatric/Behavioral: Negative for agitation and behavioral problems.       Objective     VITALS: /90 (BP Location: Left arm)   Pulse 84   Temp 97.1 °F (36.2 °C)   Resp 16   Ht 167.6 cm (66\")   " Wt 87.4 kg (192 lb 9.6 oz)   BMI 31.09 kg/m²     LABS:   Lab Results (most recent)     None          IMAGING:   Us Renal Bilateral    Result Date: 10/14/2020  Unremarkable sonographic appearance of the kidneys.  This report was finalized on 10/14/2020 9:31 AM by Dr. Dontae Troy MD.      Us Breast Right Limited    Result Date: 1/26/2021  Benign right breast imaging findings.  FINAL BI-RADS PATIENT ASSESSMENT CATEGORY: 2, BENIGN  RECOMMENDATION:  Recommend the patient resume routine annual screening mammography.  The patient was notified of the results and recommendations at the time of her visit.  Additionally, a letter, in lay terminology, with the results of this exam will be mailed to the patient.  This report was finalized on 1/26/2021 10:11 AM by Dr. Migdalia Alvarez MD.      Mammo Screening Digital Tomosynthesis Bilateral With Cad    Result Date: 1/22/2021  1. Stable mammographic appearance of the left breast with no findings suspicious for malignancy. 2. Oval mass in the right subareolar region. Recommend additional imaging.   BI-RADS CATEGORY:  0, INCOMPLETE: Need additional imaging evaluation.  RECOMMENDATION:  Focused ultrasound evaluation of the right breast.  CAD was utilized.  The standard false-negative rate of mammography is between 10% and 25%. Complex patterns or increased breast density will markedly elevate the false-negative rate of mammography.    A letter, in lay terminology, with the results of this exam will be mailed to the patient.   The patient will be contacted by our office to schedule for the additional imaging evaluation.   This report was finalized on 1/22/2021 2:03 PM by Dr. Migdalia Alvarez MD.        EXAM:  Physical Exam  Vitals signs reviewed.   Constitutional:       Appearance: She is well-developed.   HENT:      Head: Normocephalic and atraumatic.   Eyes:      Pupils: Pupils are equal, round, and reactive to light.   Neck:      Musculoskeletal: Neck supple.      Thyroid: No  thyromegaly.      Vascular: No JVD.   Cardiovascular:      Rate and Rhythm: Normal rate and regular rhythm.      Heart sounds: Normal heart sounds. No murmur. No friction rub. No gallop.    Pulmonary:      Effort: Pulmonary effort is normal. No respiratory distress.      Breath sounds: Normal breath sounds. No stridor. No wheezing or rales.   Chest:      Chest wall: No tenderness.   Musculoskeletal:         General: No tenderness or deformity.   Skin:     General: Skin is warm and dry.   Neurological:      Mental Status: She is alert and oriented to person, place, and time.      Cranial Nerves: No cranial nerve deficit.      Coordination: Coordination normal.         Procedure     ECG 12 Lead    Date/Time: 2/9/2021 8:56 AM  Performed by: Shilpa Mcgraw MD  Authorized by: Shilpa Mcgraw MD           EKG: NSR, otherwise unremarkable EKG, no previous EKG available for comparison       Assessment/Plan    Diagnosis Plan   1. Precordial chest pain     2. KATALINA (obstructive sleep apnea)     3. Palpitations     4. Hypertension, unspecified type     1. With typical and atypical features, will proceed with stress testing to assess for ischemia, will get echocardiogram to assess cardiac function, wall motion and valve morphology  2.  Mildly elevated diastolic pressure, will monitor for now  3. Will get lipid profile  4. Pending sleep study result  6. Will get event monitor  No follow-ups on file.    Diagnoses and all orders for this visit:    1. Precordial chest pain (Primary)    2. KATALINA (obstructive sleep apnea)    3. Palpitations    4. Hypertension, unspecified type    Other orders  -     ECG 12 Lead                 MEDS ORDERED DURING VISIT:  No orders of the defined types were placed in this encounter.        This document has been electronically signed by Shilpa Mcgraw MD  February 9, 2021 09:43 EST

## 2021-02-10 ENCOUNTER — TELEPHONE (OUTPATIENT)
Dept: CARDIOLOGY | Facility: CLINIC | Age: 46
End: 2021-02-10

## 2021-02-10 NOTE — TELEPHONE ENCOUNTER
Called pt to advise them they can come by the office any day in between 8 am and 11 am to have there monitor put on or we can mail it. If they want it mailed please confirm their address.       Pt is coming on Monday.

## 2021-02-17 ENCOUNTER — CLINICAL SUPPORT (OUTPATIENT)
Dept: CARDIOLOGY | Facility: CLINIC | Age: 46
End: 2021-02-17

## 2021-02-23 ENCOUNTER — OFFICE VISIT (OUTPATIENT)
Dept: CARDIOLOGY | Facility: CLINIC | Age: 46
End: 2021-02-23

## 2021-02-23 VITALS — DIASTOLIC BLOOD PRESSURE: 85 MMHG | SYSTOLIC BLOOD PRESSURE: 143 MMHG | HEART RATE: 80 BPM | TEMPERATURE: 97.8 F

## 2021-02-23 DIAGNOSIS — R00.2 PALPITATIONS: ICD-10-CM

## 2021-02-23 DIAGNOSIS — R00.0 WIDE-COMPLEX TACHYCARDIA: Primary | ICD-10-CM

## 2021-02-23 DIAGNOSIS — G47.33 OSA (OBSTRUCTIVE SLEEP APNEA): ICD-10-CM

## 2021-02-23 DIAGNOSIS — I10 ESSENTIAL HYPERTENSION: ICD-10-CM

## 2021-02-23 PROCEDURE — 99214 OFFICE O/P EST MOD 30 MIN: CPT | Performed by: SPECIALIST

## 2021-02-23 RX ORDER — METOPROLOL TARTRATE 50 MG/1
50 TABLET, FILM COATED ORAL 2 TIMES DAILY
Qty: 60 TABLET | Refills: 11 | Status: SHIPPED | OUTPATIENT
Start: 2021-02-23 | End: 2021-03-09

## 2021-02-23 NOTE — PROGRESS NOTES
Subjective   Follow up, wide complex tachycardia  Iraida Heard is a 45 y.o. female who presents to day for Follow-up (ABNORMAL EVENT READING) and Med Management.    CHIEF COMPLIANT  Chief Complaint   Patient presents with   • Follow-up     ABNORMAL EVENT READING   • Med Management       Active Problems:  Problem List Items Addressed This Visit        Cardiac and Vasculature    Palpitations    Relevant Orders    Comprehensive Metabolic Panel    Hypertension    Relevant Medications    metoprolol tartrate (LOPRESSOR) 50 MG tablet    Wide-complex tachycardia (CMS/HCC) - Primary    Relevant Medications    metoprolol tartrate (LOPRESSOR) 50 MG tablet    Other Relevant Orders    Comprehensive Metabolic Panel    NMR LipoProfile    C-reactive Protein    Adult Transthoracic Echo Complete w/ Color, Spectral and Contrast if necessary per protocol       Sleep    KATALINA (obstructive sleep apnea)          HPI  HPI  Patient is called in because of episodes of WCT, she feels tired all the time, still with palpitations on and off, almost dialy, no dizziness, never syncope, no edema, stable shortness of breath on moderate exertion, with chest pain almost daily, not related to exertion, can last for hours  PRIOR MEDS  Current Outpatient Medications on File Prior to Visit   Medication Sig Dispense Refill   • ALPRAZolam (XANAX) 1 MG tablet TAKE 1 TABLET BY MOUTH AT NIGHT     • busPIRone (BUSPAR) 5 MG tablet TAKE 1 TABLET BY MOUTH THREE (3) TIMES DAILY     • Cranberry 125 MG tablet Take  by mouth Daily.     • diclofenac (VOLTAREN) 75 MG EC tablet      • dicyclomine (BENTYL) 20 MG tablet Take  by mouth Every 8 (Eight) Hours.     • estradiol (ESTRACE) 1 MG tablet Take 1 mg by mouth Daily.     • gabapentin (NEURONTIN) 100 MG capsule 100 mg 2 (two) times a day. Takes 1cap. A.m. 2 caps p.m.     • omeprazole (priLOSEC) 20 MG capsule Take 20 mg by mouth Daily.     • Probiotic Product (Risaquad-2) capsule capsule Take 1 capsule by mouth Daily.       • traMADol (ULTRAM) 50 MG tablet take 1 tablet by oral route every night as needed     • vitamin B-12 (CYANOCOBALAMIN) 1000 MCG tablet Take 1,000 mcg by mouth Daily.     • [DISCONTINUED] amoxicillin (AMOXIL) 875 MG tablet Take 875 mg by mouth 2 (Two) Times a Day.       No current facility-administered medications on file prior to visit.        ALLERGIES  Patient has no known allergies.    HISTORY  Past Medical History:   Diagnosis Date   • Arthritis    • Hypertension 2/9/2021       Social History     Socioeconomic History   • Marital status:      Spouse name: Not on file   • Number of children: Not on file   • Years of education: Not on file   • Highest education level: Not on file   Tobacco Use   • Smoking status: Never Smoker   • Smokeless tobacco: Never Used   Substance and Sexual Activity   • Alcohol use: Never     Frequency: Never   • Drug use: Never   • Sexual activity: Defer       Family History   Problem Relation Age of Onset   • Breast cancer Maternal Grandmother         60's   • Cancer Mother         kidney   • Diabetes Mother    • Hypertension Father    • Heart disease Father        Review of Systems   Constitutional: Negative for activity change and appetite change.   HENT: Negative for congestion.    Eyes: Negative for discharge and itching.   Respiratory: Positive for chest tightness and shortness of breath. Negative for apnea, cough, choking and stridor.    Cardiovascular: Positive for palpitations. Negative for chest pain and leg swelling.   Gastrointestinal: Negative for abdominal distention and abdominal pain.   Endocrine: Negative for cold intolerance and heat intolerance.   Genitourinary: Negative for flank pain.   Musculoskeletal: Negative for gait problem.   Skin: Negative for color change and pallor.   Neurological: Negative for dizziness.   Psychiatric/Behavioral: Negative for agitation and behavioral problems.       Objective     VITALS: /85   Pulse 80   Temp 97.8 °F (36.6  °C)     LABS:   Lab Results (most recent)     None          IMAGING:   Us Breast Right Limited    Result Date: 1/26/2021  Benign right breast imaging findings.  FINAL BI-RADS PATIENT ASSESSMENT CATEGORY: 2, BENIGN  RECOMMENDATION:  Recommend the patient resume routine annual screening mammography.  The patient was notified of the results and recommendations at the time of her visit.  Additionally, a letter, in lay terminology, with the results of this exam will be mailed to the patient.  This report was finalized on 1/26/2021 10:11 AM by Dr. Migdalia Alvarez MD.      Mammo Screening Digital Tomosynthesis Bilateral With Cad    Result Date: 1/22/2021  1. Stable mammographic appearance of the left breast with no findings suspicious for malignancy. 2. Oval mass in the right subareolar region. Recommend additional imaging.   BI-RADS CATEGORY:  0, INCOMPLETE: Need additional imaging evaluation.  RECOMMENDATION:  Focused ultrasound evaluation of the right breast.  CAD was utilized.  The standard false-negative rate of mammography is between 10% and 25%. Complex patterns or increased breast density will markedly elevate the false-negative rate of mammography.    A letter, in lay terminology, with the results of this exam will be mailed to the patient.   The patient will be contacted by our office to schedule for the additional imaging evaluation.   This report was finalized on 1/22/2021 2:03 PM by Dr. Migdalia Alvarez MD.        EXAM:  Physical Exam  Vitals signs reviewed.   Constitutional:       Appearance: She is well-developed.   HENT:      Head: Normocephalic and atraumatic.   Eyes:      Pupils: Pupils are equal, round, and reactive to light.   Neck:      Musculoskeletal: Neck supple.      Thyroid: No thyromegaly.      Vascular: No JVD.   Cardiovascular:      Rate and Rhythm: Normal rate and regular rhythm.      Heart sounds: Normal heart sounds. No murmur. No friction rub. No gallop.    Pulmonary:      Effort:  Pulmonary effort is normal. No respiratory distress.      Breath sounds: Normal breath sounds. No stridor. No wheezing or rales.   Chest:      Chest wall: No tenderness.   Musculoskeletal:         General: No tenderness or deformity.   Skin:     General: Skin is warm and dry.   Neurological:      Mental Status: She is alert and oriented to person, place, and time.      Cranial Nerves: No cranial nerve deficit.      Coordination: Coordination normal.         Procedure   Procedures       Assessment/Plan    Diagnosis Plan   1. Wide-complex tachycardia (CMS/HCC)  metoprolol tartrate (LOPRESSOR) 50 MG tablet    Comprehensive Metabolic Panel    NMR LipoProfile    C-reactive Protein    Adult Transthoracic Echo Complete w/ Color, Spectral and Contrast if necessary per protocol   2. Palpitations  Comprehensive Metabolic Panel   3. Essential hypertension     4. KATALINA (obstructive sleep apnea)     1.  She had runs of wide-complex tachycardia up to 24 beats she feels palpitation but there is no significant presyncope or syncope she still has not had the stress test with echocardiogram done yet we discussed this issue and will start her metoprolol 50 mg twice daily to try to control the arrhythmia for now pending the results of the studies  2.  Blood pressure is a bit elevated will add metoprolol as mentioned above  3.  I still have not gotten any labs yet I will try again to get the CMP and NMR and CRP  4.  She had a sleep study result is not available to me yet we will try to get it    Return after stress test.    Diagnoses and all orders for this visit:    1. Wide-complex tachycardia (CMS/HCC) (Primary)  -     metoprolol tartrate (LOPRESSOR) 50 MG tablet; Take 1 tablet by mouth 2 (Two) Times a Day.  Dispense: 60 tablet; Refill: 11  -     Comprehensive Metabolic Panel; Future  -     NMR LipoProfile; Future  -     C-reactive Protein; Future  -     Adult Transthoracic Echo Complete w/ Color, Spectral and Contrast if necessary per  protocol; Future    2. Palpitations  -     Comprehensive Metabolic Panel; Future    3. Essential hypertension    4. KATALINA (obstructive sleep apnea)                   MEDS ORDERED DURING VISIT:  New Medications Ordered This Visit   Medications   • metoprolol tartrate (LOPRESSOR) 50 MG tablet     Sig: Take 1 tablet by mouth 2 (Two) Times a Day.     Dispense:  60 tablet     Refill:  11         This document has been electronically signed by Shilpa Mcgraw MD  February 23, 2021 11:28 EST

## 2021-02-25 ENCOUNTER — HOSPITAL ENCOUNTER (EMERGENCY)
Facility: HOSPITAL | Age: 46
Discharge: HOME | End: 2021-02-25
Attending: FAMILY MEDICINE | Admitting: FAMILY MEDICINE

## 2021-02-25 ENCOUNTER — APPOINTMENT (OUTPATIENT)
Dept: GENERAL RADIOLOGY | Facility: HOSPITAL | Age: 46
End: 2021-02-25

## 2021-02-25 ENCOUNTER — TELEPHONE (OUTPATIENT)
Dept: CARDIOLOGY | Facility: CLINIC | Age: 46
End: 2021-02-25

## 2021-02-25 VITALS
RESPIRATION RATE: 20 BRPM | HEIGHT: 66 IN | HEART RATE: 63 BPM | DIASTOLIC BLOOD PRESSURE: 92 MMHG | BODY MASS INDEX: 30.53 KG/M2 | OXYGEN SATURATION: 97 % | SYSTOLIC BLOOD PRESSURE: 132 MMHG | WEIGHT: 190 LBS | TEMPERATURE: 97.9 F

## 2021-02-25 DIAGNOSIS — R07.89 ATYPICAL CHEST PAIN: Primary | ICD-10-CM

## 2021-02-25 LAB
ALBUMIN SERPL-MCNC: 4.35 G/DL (ref 3.5–5.2)
ALBUMIN/GLOB SERPL: 1.5 G/DL
ALP SERPL-CCNC: 64 U/L (ref 39–117)
ALT SERPL W P-5'-P-CCNC: 24 U/L (ref 1–33)
ANION GAP SERPL CALCULATED.3IONS-SCNC: 9.5 MMOL/L (ref 5–15)
AST SERPL-CCNC: 27 U/L (ref 1–32)
B-HCG UR QL: NEGATIVE
BASOPHILS # BLD AUTO: 0.03 10*3/MM3 (ref 0–0.2)
BASOPHILS NFR BLD AUTO: 0.4 % (ref 0–1.5)
BILIRUB SERPL-MCNC: 0.3 MG/DL (ref 0–1.2)
BILIRUB UR QL STRIP: NEGATIVE
BUN SERPL-MCNC: 14 MG/DL (ref 6–20)
BUN/CREAT SERPL: 16.9 (ref 7–25)
CALCIUM SPEC-SCNC: 8.9 MG/DL (ref 8.6–10.5)
CHLORIDE SERPL-SCNC: 103 MMOL/L (ref 98–107)
CLARITY UR: CLEAR
CO2 SERPL-SCNC: 25.5 MMOL/L (ref 22–29)
COLOR UR: YELLOW
CREAT SERPL-MCNC: 0.83 MG/DL (ref 0.57–1)
CRP SERPL-MCNC: 0.57 MG/DL (ref 0–0.5)
DEPRECATED RDW RBC AUTO: 43.5 FL (ref 37–54)
EOSINOPHIL # BLD AUTO: 0.15 10*3/MM3 (ref 0–0.4)
EOSINOPHIL NFR BLD AUTO: 2.1 % (ref 0.3–6.2)
ERYTHROCYTE [DISTWIDTH] IN BLOOD BY AUTOMATED COUNT: 12 % (ref 12.3–15.4)
GFR SERPL CREATININE-BSD FRML MDRD: 75 ML/MIN/1.73
GLOBULIN UR ELPH-MCNC: 2.9 GM/DL
GLUCOSE SERPL-MCNC: 108 MG/DL (ref 65–99)
GLUCOSE UR STRIP-MCNC: NEGATIVE MG/DL
HCT VFR BLD AUTO: 42.4 % (ref 34–46.6)
HGB BLD-MCNC: 13.7 G/DL (ref 12–15.9)
HGB UR QL STRIP.AUTO: NEGATIVE
IMM GRANULOCYTES # BLD AUTO: 0.03 10*3/MM3 (ref 0–0.05)
IMM GRANULOCYTES NFR BLD AUTO: 0.4 % (ref 0–0.5)
KETONES UR QL STRIP: NEGATIVE
LEUKOCYTE ESTERASE UR QL STRIP.AUTO: NEGATIVE
LIPASE SERPL-CCNC: 29 U/L (ref 13–60)
LYMPHOCYTES # BLD AUTO: 2.67 10*3/MM3 (ref 0.7–3.1)
LYMPHOCYTES NFR BLD AUTO: 36.6 % (ref 19.6–45.3)
MAGNESIUM SERPL-MCNC: 1.8 MG/DL (ref 1.6–2.6)
MCH RBC QN AUTO: 31.5 PG (ref 26.6–33)
MCHC RBC AUTO-ENTMCNC: 32.3 G/DL (ref 31.5–35.7)
MCV RBC AUTO: 97.5 FL (ref 79–97)
MONOCYTES # BLD AUTO: 0.57 10*3/MM3 (ref 0.1–0.9)
MONOCYTES NFR BLD AUTO: 7.8 % (ref 5–12)
NEUTROPHILS NFR BLD AUTO: 3.85 10*3/MM3 (ref 1.7–7)
NEUTROPHILS NFR BLD AUTO: 52.7 % (ref 42.7–76)
NITRITE UR QL STRIP: NEGATIVE
NRBC BLD AUTO-RTO: 0 /100 WBC (ref 0–0.2)
NT-PROBNP SERPL-MCNC: 55.5 PG/ML (ref 0–450)
PH UR STRIP.AUTO: 5.5 [PH] (ref 5–8)
PLATELET # BLD AUTO: 248 10*3/MM3 (ref 140–450)
PMV BLD AUTO: 9.8 FL (ref 6–12)
POTASSIUM SERPL-SCNC: 4 MMOL/L (ref 3.5–5.2)
PROT SERPL-MCNC: 7.2 G/DL (ref 6–8.5)
PROT UR QL STRIP: NEGATIVE
RBC # BLD AUTO: 4.35 10*6/MM3 (ref 3.77–5.28)
SODIUM SERPL-SCNC: 138 MMOL/L (ref 136–145)
SP GR UR STRIP: 1.01 (ref 1–1.03)
T4 FREE SERPL-MCNC: 0.89 NG/DL (ref 0.93–1.7)
TROPONIN T SERPL-MCNC: <0.01 NG/ML (ref 0–0.03)
TROPONIN T SERPL-MCNC: <0.01 NG/ML (ref 0–0.03)
TSH SERPL DL<=0.05 MIU/L-ACNC: 2.3 UIU/ML (ref 0.27–4.2)
UROBILINOGEN UR QL STRIP: NORMAL
WBC # BLD AUTO: 7.3 10*3/MM3 (ref 3.4–10.8)

## 2021-02-25 PROCEDURE — 84439 ASSAY OF FREE THYROXINE: CPT | Performed by: FAMILY MEDICINE

## 2021-02-25 PROCEDURE — 85025 COMPLETE CBC W/AUTO DIFF WBC: CPT | Performed by: FAMILY MEDICINE

## 2021-02-25 PROCEDURE — 86140 C-REACTIVE PROTEIN: CPT | Performed by: FAMILY MEDICINE

## 2021-02-25 PROCEDURE — 80053 COMPREHEN METABOLIC PANEL: CPT | Performed by: FAMILY MEDICINE

## 2021-02-25 PROCEDURE — 93005 ELECTROCARDIOGRAM TRACING: CPT | Performed by: FAMILY MEDICINE

## 2021-02-25 PROCEDURE — 83880 ASSAY OF NATRIURETIC PEPTIDE: CPT | Performed by: FAMILY MEDICINE

## 2021-02-25 PROCEDURE — 83735 ASSAY OF MAGNESIUM: CPT | Performed by: FAMILY MEDICINE

## 2021-02-25 PROCEDURE — 99284 EMERGENCY DEPT VISIT MOD MDM: CPT

## 2021-02-25 PROCEDURE — 83690 ASSAY OF LIPASE: CPT | Performed by: FAMILY MEDICINE

## 2021-02-25 PROCEDURE — 81025 URINE PREGNANCY TEST: CPT | Performed by: FAMILY MEDICINE

## 2021-02-25 PROCEDURE — 71045 X-RAY EXAM CHEST 1 VIEW: CPT

## 2021-02-25 PROCEDURE — 81003 URINALYSIS AUTO W/O SCOPE: CPT | Performed by: FAMILY MEDICINE

## 2021-02-25 PROCEDURE — 93010 ELECTROCARDIOGRAM REPORT: CPT | Performed by: INTERNAL MEDICINE

## 2021-02-25 PROCEDURE — 71045 X-RAY EXAM CHEST 1 VIEW: CPT | Performed by: RADIOLOGY

## 2021-02-25 PROCEDURE — 84484 ASSAY OF TROPONIN QUANT: CPT | Performed by: FAMILY MEDICINE

## 2021-02-25 PROCEDURE — 25010000002 MORPHINE PER 10 MG: Performed by: FAMILY MEDICINE

## 2021-02-25 PROCEDURE — 84443 ASSAY THYROID STIM HORMONE: CPT | Performed by: FAMILY MEDICINE

## 2021-02-25 PROCEDURE — 96374 THER/PROPH/DIAG INJ IV PUSH: CPT

## 2021-02-25 RX ORDER — SODIUM CHLORIDE 0.9 % (FLUSH) 0.9 %
10 SYRINGE (ML) INJECTION AS NEEDED
Status: DISCONTINUED | OUTPATIENT
Start: 2021-02-25 | End: 2021-02-25 | Stop reason: HOSPADM

## 2021-02-25 RX ORDER — MORPHINE SULFATE 2 MG/ML
2 INJECTION, SOLUTION INTRAMUSCULAR; INTRAVENOUS ONCE
Status: COMPLETED | OUTPATIENT
Start: 2021-02-25 | End: 2021-02-25

## 2021-02-25 RX ADMIN — MORPHINE SULFATE 2 MG: 2 INJECTION, SOLUTION INTRAMUSCULAR; INTRAVENOUS at 13:32

## 2021-02-25 NOTE — TELEPHONE ENCOUNTER
Patient called to let us know she is having chest pain and a bad headache.  Patient advised to consider going to the emergency room but patient wanted to speak with our clinical staff to check if anything is showing on her monitor readings.

## 2021-02-25 NOTE — TELEPHONE ENCOUNTER
Called patient and she is not feeling good and states that she is feeling dizzy now, but yesterday she  was having pain on the left side of the collar bone and her breast bone. She advised me today that yesterday she even called the holter monitor people to see if the holter was showing anything.  I  Advised the patient to go to the ER .

## 2021-02-26 LAB
QT INTERVAL: 396 MS
QTC INTERVAL: 421 MS

## 2021-03-04 ENCOUNTER — HOSPITAL ENCOUNTER (OUTPATIENT)
Dept: CARDIOLOGY | Facility: HOSPITAL | Age: 46
Discharge: HOME | End: 2021-03-04

## 2021-03-04 ENCOUNTER — HOSPITAL ENCOUNTER (OUTPATIENT)
Dept: NUCLEAR MEDICINE | Facility: HOSPITAL | Age: 46
Discharge: HOME | End: 2021-03-04

## 2021-03-04 DIAGNOSIS — R07.2 PRECORDIAL CHEST PAIN: ICD-10-CM

## 2021-03-04 LAB
BH CV ECHO MEAS - % IVS THICK: 52.6 %
BH CV ECHO MEAS - % LVPW THICK: 72.6 %
BH CV ECHO MEAS - ACS: 1.8 CM
BH CV ECHO MEAS - AO MAX PG: 9.2 MMHG
BH CV ECHO MEAS - AO MEAN PG: 4 MMHG
BH CV ECHO MEAS - AO ROOT AREA (BSA CORRECTED): 1.3
BH CV ECHO MEAS - AO ROOT AREA: 4.7 CM^2
BH CV ECHO MEAS - AO ROOT DIAM: 2.5 CM
BH CV ECHO MEAS - AO V2 MAX: 152 CM/SEC
BH CV ECHO MEAS - AO V2 MEAN: 96.9 CM/SEC
BH CV ECHO MEAS - AO V2 VTI: 33.6 CM
BH CV ECHO MEAS - BSA(HAYCOCK): 2 M^2
BH CV ECHO MEAS - BSA: 2 M^2
BH CV ECHO MEAS - BZI_BMI: 30.7 KILOGRAMS/M^2
BH CV ECHO MEAS - BZI_METRIC_HEIGHT: 167.6 CM
BH CV ECHO MEAS - BZI_METRIC_WEIGHT: 86.2 KG
BH CV ECHO MEAS - EDV(CUBED): 79.2 ML
BH CV ECHO MEAS - EDV(MOD-SP4): 35.2 ML
BH CV ECHO MEAS - EDV(TEICH): 82.8 ML
BH CV ECHO MEAS - EF(CUBED): 72.3 %
BH CV ECHO MEAS - EF(MOD-SP4): 52 %
BH CV ECHO MEAS - EF(TEICH): 64.3 %
BH CV ECHO MEAS - ESV(CUBED): 22 ML
BH CV ECHO MEAS - ESV(MOD-SP4): 16.9 ML
BH CV ECHO MEAS - ESV(TEICH): 29.6 ML
BH CV ECHO MEAS - FS: 34.8 %
BH CV ECHO MEAS - IVS/LVPW: 0.97
BH CV ECHO MEAS - IVSD: 0.93 CM
BH CV ECHO MEAS - IVSS: 1.4 CM
BH CV ECHO MEAS - LA DIMENSION: 3.3 CM
BH CV ECHO MEAS - LA/AO: 1.3
BH CV ECHO MEAS - LV DIASTOLIC VOL/BSA (35-75): 18 ML/M^2
BH CV ECHO MEAS - LV MASS(C)D: 131.2 GRAMS
BH CV ECHO MEAS - LV MASS(C)DI: 67 GRAMS/M^2
BH CV ECHO MEAS - LV MASS(C)S: 150.6 GRAMS
BH CV ECHO MEAS - LV MASS(C)SI: 77 GRAMS/M^2
BH CV ECHO MEAS - LV SYSTOLIC VOL/BSA (12-30): 8.6 ML/M^2
BH CV ECHO MEAS - LVIDD: 4.3 CM
BH CV ECHO MEAS - LVIDS: 2.8 CM
BH CV ECHO MEAS - LVLD AP4: 7.2 CM
BH CV ECHO MEAS - LVLS AP4: 7 CM
BH CV ECHO MEAS - LVOT AREA (M): 2.5 CM^2
BH CV ECHO MEAS - LVOT AREA: 2.5 CM^2
BH CV ECHO MEAS - LVOT DIAM: 1.8 CM
BH CV ECHO MEAS - LVPWD: 0.96 CM
BH CV ECHO MEAS - LVPWS: 1.7 CM
BH CV ECHO MEAS - MV A MAX VEL: 61.3 CM/SEC
BH CV ECHO MEAS - MV E MAX VEL: 96.8 CM/SEC
BH CV ECHO MEAS - MV E/A: 1.6
BH CV ECHO MEAS - PA ACC TIME: 0.1 SEC
BH CV ECHO MEAS - PA PR(ACCEL): 33.1 MMHG
BH CV ECHO MEAS - RAP SYSTOLE: 10 MMHG
BH CV ECHO MEAS - RVSP: 32.8 MMHG
BH CV ECHO MEAS - SI(AO): 80.9 ML/M^2
BH CV ECHO MEAS - SI(CUBED): 29.3 ML/M^2
BH CV ECHO MEAS - SI(MOD-SP4): 9.4 ML/M^2
BH CV ECHO MEAS - SI(TEICH): 27.2 ML/M^2
BH CV ECHO MEAS - SV(AO): 158.4 ML
BH CV ECHO MEAS - SV(CUBED): 57.3 ML
BH CV ECHO MEAS - SV(MOD-SP4): 18.3 ML
BH CV ECHO MEAS - SV(TEICH): 53.3 ML
BH CV ECHO MEAS - TR MAX VEL: 239 CM/SEC
BH CV NUCLEAR PRIOR STUDY: 3
BH CV STRESS BP STAGE 1: NORMAL
BH CV STRESS BP STAGE 2: NORMAL
BH CV STRESS COMMENTS STAGE 1: NORMAL
BH CV STRESS COMMENTS STAGE 2: NORMAL
BH CV STRESS DOSE REGADENOSON STAGE 1: 0.4
BH CV STRESS DURATION MIN STAGE 1: 0
BH CV STRESS DURATION MIN STAGE 2: 4
BH CV STRESS DURATION SEC STAGE 1: 10
BH CV STRESS DURATION SEC STAGE 2: 0
BH CV STRESS HR STAGE 1: 118
BH CV STRESS HR STAGE 2: 121
BH CV STRESS PROTOCOL 1: NORMAL
BH CV STRESS RECOVERY BP: NORMAL MMHG
BH CV STRESS RECOVERY HR: 103 BPM
BH CV STRESS STAGE 1: 1
BH CV STRESS STAGE 2: 2
LV EF NUC BP: 74 %
MAXIMAL PREDICTED HEART RATE: 176 BPM
MAXIMAL PREDICTED HEART RATE: 176 BPM
PERCENT MAX PREDICTED HR: 68.75 %
STRESS BASELINE BP: NORMAL MMHG
STRESS BASELINE HR: 71 BPM
STRESS PERCENT HR: 81 %
STRESS POST PEAK BP: NORMAL MMHG
STRESS POST PEAK HR: 121 BPM
STRESS TARGET HR: 150 BPM
STRESS TARGET HR: 150 BPM

## 2021-03-04 PROCEDURE — 86140 C-REACTIVE PROTEIN: CPT | Performed by: SPECIALIST

## 2021-03-04 PROCEDURE — A9500 TC99M SESTAMIBI: HCPCS | Performed by: SPECIALIST

## 2021-03-04 PROCEDURE — 93018 CV STRESS TEST I&R ONLY: CPT | Performed by: SPECIALIST

## 2021-03-04 PROCEDURE — 80061 LIPID PANEL: CPT | Performed by: SPECIALIST

## 2021-03-04 PROCEDURE — 25010000002 REGADENOSON 0.4 MG/5ML SOLUTION: Performed by: SPECIALIST

## 2021-03-04 PROCEDURE — 93306 TTE W/DOPPLER COMPLETE: CPT | Performed by: SPECIALIST

## 2021-03-04 PROCEDURE — 93306 TTE W/DOPPLER COMPLETE: CPT

## 2021-03-04 PROCEDURE — 0 TECHNETIUM SESTAMIBI: Performed by: SPECIALIST

## 2021-03-04 PROCEDURE — 78452 HT MUSCLE IMAGE SPECT MULT: CPT | Performed by: SPECIALIST

## 2021-03-04 PROCEDURE — 93017 CV STRESS TEST TRACING ONLY: CPT

## 2021-03-04 PROCEDURE — 80053 COMPREHEN METABOLIC PANEL: CPT | Performed by: SPECIALIST

## 2021-03-04 PROCEDURE — 78452 HT MUSCLE IMAGE SPECT MULT: CPT

## 2021-03-04 PROCEDURE — 83704 LIPOPROTEIN BLD QUAN PART: CPT | Performed by: SPECIALIST

## 2021-03-04 RX ADMIN — TECHNETIUM TC 99M SESTAMIBI 1 DOSE: 1 INJECTION INTRAVENOUS at 10:14

## 2021-03-04 RX ADMIN — REGADENOSON 0.4 MG: 0.08 INJECTION, SOLUTION INTRAVENOUS at 10:14

## 2021-03-04 RX ADMIN — TECHNETIUM TC 99M SESTAMIBI 1 DOSE: 1 INJECTION INTRAVENOUS at 08:55

## 2021-03-08 ENCOUNTER — TREATMENT (OUTPATIENT)
Dept: CARDIOLOGY | Facility: CLINIC | Age: 46
End: 2021-03-08

## 2021-03-08 DIAGNOSIS — R00.2 PALPITATIONS: ICD-10-CM

## 2021-03-08 PROCEDURE — 93228 REMOTE 30 DAY ECG REV/REPORT: CPT | Performed by: SPECIALIST

## 2021-03-09 ENCOUNTER — OFFICE VISIT (OUTPATIENT)
Dept: CARDIOLOGY | Facility: CLINIC | Age: 46
End: 2021-03-09

## 2021-03-09 VITALS
TEMPERATURE: 98.6 F | OXYGEN SATURATION: 98 % | HEART RATE: 67 BPM | HEIGHT: 66 IN | WEIGHT: 194.8 LBS | BODY MASS INDEX: 31.31 KG/M2 | SYSTOLIC BLOOD PRESSURE: 130 MMHG | DIASTOLIC BLOOD PRESSURE: 86 MMHG

## 2021-03-09 DIAGNOSIS — E78.2 MIXED HYPERLIPIDEMIA: ICD-10-CM

## 2021-03-09 DIAGNOSIS — R07.2 PRECORDIAL CHEST PAIN: ICD-10-CM

## 2021-03-09 DIAGNOSIS — R00.0 WIDE-COMPLEX TACHYCARDIA: Primary | ICD-10-CM

## 2021-03-09 DIAGNOSIS — I10 ESSENTIAL HYPERTENSION: ICD-10-CM

## 2021-03-09 DIAGNOSIS — R00.2 PALPITATIONS: ICD-10-CM

## 2021-03-09 DIAGNOSIS — R06.83 SNORING: ICD-10-CM

## 2021-03-09 PROCEDURE — 99214 OFFICE O/P EST MOD 30 MIN: CPT | Performed by: SPECIALIST

## 2021-03-09 RX ORDER — METOPROLOL TARTRATE 100 MG/1
100 TABLET ORAL 2 TIMES DAILY
Qty: 180 TABLET | Refills: 1 | Status: SHIPPED | OUTPATIENT
Start: 2021-03-09 | End: 2021-03-09 | Stop reason: SDUPTHER

## 2021-03-09 RX ORDER — METOPROLOL TARTRATE 100 MG/1
100 TABLET ORAL 2 TIMES DAILY
Qty: 180 TABLET | Refills: 1 | Status: SHIPPED | OUTPATIENT
Start: 2021-03-09 | End: 2021-06-08 | Stop reason: SDUPTHER

## 2021-03-09 RX ORDER — ROSUVASTATIN CALCIUM 20 MG/1
20 TABLET, COATED ORAL DAILY
Qty: 90 TABLET | Refills: 3 | Status: SHIPPED | OUTPATIENT
Start: 2021-03-09 | End: 2021-06-08 | Stop reason: SDUPTHER

## 2021-03-09 NOTE — PROGRESS NOTES
Subjective   Follow up, wide complex tachycardia  Iraida Heard is a 45 y.o. female who presents to day for Med Management (med list ), Chest Pain, and Shortness of Breath.    CHIEF COMPLIANT  Chief Complaint   Patient presents with   • Med Management     med list    • Chest Pain   • Shortness of Breath       Active Problems:  Problem List Items Addressed This Visit        Cardiac and Vasculature    Precordial chest pain    Palpitations    Hypertension    Relevant Medications    metoprolol tartrate (LOPRESSOR) 100 MG tablet    Wide-complex tachycardia (CMS/HCC) - Primary    Relevant Medications    metoprolol tartrate (LOPRESSOR) 100 MG tablet    Other Relevant Orders    Ambulatory Referral to Cardiac Electrophysiology    Mixed hyperlipidemia    Relevant Medications    rosuvastatin (CRESTOR) 20 MG tablet    Other Relevant Orders    NMR LipoProfile    Comprehensive Metabolic Panel       Sleep    Snoring          HPI  HPI  Had constant chest pain for few days, was seen at the ER, and was told the problem is with her thyroid, pain is worse with taking a deep breath, or lifting, still with palpitations, with occasional dizziness, she has stable shortness of breath to moderate exertion, no edema  PRIOR MEDS  Current Outpatient Medications on File Prior to Visit   Medication Sig Dispense Refill   • ALPRAZolam (XANAX) 1 MG tablet TAKE 1 TABLET BY MOUTH AT NIGHT     • busPIRone (BUSPAR) 5 MG tablet TAKE 1 TABLET BY MOUTH THREE (3) TIMES DAILY     • Calcium Polycarbophil (FIBER LAXATIVE PO) Take  by mouth.     • Cranberry 125 MG tablet Take  by mouth Daily.     • diclofenac (VOLTAREN) 75 MG EC tablet      • dicyclomine (BENTYL) 20 MG tablet Take  by mouth Every 8 (Eight) Hours.     • estradiol (ESTRACE) 1 MG tablet Take 1 mg by mouth Daily.     • gabapentin (NEURONTIN) 100 MG capsule 100 mg 2 (two) times a day. Takes 1cap. A.m. 2 caps p.m.     • omeprazole (priLOSEC) 20 MG capsule Take 20 mg by mouth Daily.     • Probiotic  Product (Risaquad-2) capsule capsule Take 1 capsule by mouth Daily.     • vitamin B-12 (CYANOCOBALAMIN) 1000 MCG tablet Take 1,000 mcg by mouth Daily.     • [DISCONTINUED] metoprolol tartrate (LOPRESSOR) 50 MG tablet Take 1 tablet by mouth 2 (Two) Times a Day. 60 tablet 11   • traMADol (ULTRAM) 50 MG tablet take 1 tablet by oral route every night as needed       No current facility-administered medications on file prior to visit.       ALLERGIES  Patient has no known allergies.    HISTORY  Past Medical History:   Diagnosis Date   • Arthritis    • Hypertension 2/9/2021       Social History     Socioeconomic History   • Marital status:      Spouse name: Not on file   • Number of children: Not on file   • Years of education: Not on file   • Highest education level: Not on file   Tobacco Use   • Smoking status: Never Smoker   • Smokeless tobacco: Never Used   Substance and Sexual Activity   • Alcohol use: Never   • Drug use: Never   • Sexual activity: Defer       Family History   Problem Relation Age of Onset   • Breast cancer Maternal Grandmother         60's   • Cancer Mother         kidney   • Diabetes Mother    • Hypertension Father    • Heart disease Father        Review of Systems   Constitutional: Negative for activity change and appetite change.   HENT: Negative for congestion.    Eyes: Negative for discharge and itching.   Respiratory: Positive for shortness of breath. Negative for apnea, cough, choking, chest tightness, wheezing and stridor.    Cardiovascular: Positive for chest pain. Negative for palpitations and leg swelling.   Gastrointestinal: Negative for abdominal distention and abdominal pain.   Endocrine: Negative for cold intolerance and heat intolerance.   Genitourinary: Negative for flank pain.   Musculoskeletal: Negative for gait problem.   Skin: Negative for color change and pallor.   Neurological: Negative for dizziness and syncope.   Psychiatric/Behavioral: Negative for agitation and  "behavioral problems.       Objective     VITALS: /86 (BP Location: Left arm, Patient Position: Sitting, Cuff Size: Adult)   Pulse 67   Temp 98.6 °F (37 °C) (Infrared)   Ht 167.6 cm (66\")   Wt 88.4 kg (194 lb 12.8 oz)   SpO2 98%   BMI 31.44 kg/m²     LABS:   Lab Results (most recent)     None          IMAGING:   XR Chest 1 View    Result Date: 2/25/2021    Unremarkable exam. No acute cardiopulmonary findings identified.  This report was finalized on 2/25/2021 11:59 AM by Dr. Ivan Matthews MD.      US Breast Right Limited    Result Date: 1/26/2021  Benign right breast imaging findings.  FINAL BI-RADS PATIENT ASSESSMENT CATEGORY: 2, BENIGN  RECOMMENDATION:  Recommend the patient resume routine annual screening mammography.  The patient was notified of the results and recommendations at the time of her visit.  Additionally, a letter, in lay terminology, with the results of this exam will be mailed to the patient.  This report was finalized on 1/26/2021 10:11 AM by Dr. Migdalia Alvarez MD.      Mammo Screening Digital Tomosynthesis Bilateral With CAD    Result Date: 1/22/2021  1. Stable mammographic appearance of the left breast with no findings suspicious for malignancy. 2. Oval mass in the right subareolar region. Recommend additional imaging.   BI-RADS CATEGORY:  0, INCOMPLETE: Need additional imaging evaluation.  RECOMMENDATION:  Focused ultrasound evaluation of the right breast.  CAD was utilized.  The standard false-negative rate of mammography is between 10% and 25%. Complex patterns or increased breast density will markedly elevate the false-negative rate of mammography.    A letter, in lay terminology, with the results of this exam will be mailed to the patient.   The patient will be contacted by our office to schedule for the additional imaging evaluation.   This report was finalized on 1/22/2021 2:03 PM by Dr. Migdalia Alvarez MD.        EXAM:  Physical Exam  Vitals reviewed.   Constitutional:      "  Appearance: She is well-developed.   HENT:      Head: Normocephalic and atraumatic.   Eyes:      Pupils: Pupils are equal, round, and reactive to light.   Neck:      Thyroid: No thyromegaly.      Vascular: No JVD.   Cardiovascular:      Rate and Rhythm: Normal rate and regular rhythm.      Heart sounds: Normal heart sounds. No murmur. No friction rub. No gallop.    Pulmonary:      Effort: Pulmonary effort is normal. No respiratory distress.      Breath sounds: Normal breath sounds. No stridor. No wheezing or rales.   Chest:      Chest wall: No tenderness.   Musculoskeletal:         General: No tenderness or deformity.      Cervical back: Neck supple.   Skin:     General: Skin is warm and dry.   Neurological:      Mental Status: She is alert and oriented to person, place, and time.      Cranial Nerves: No cranial nerve deficit.      Coordination: Coordination normal.         Procedure   Procedures       Assessment/Plan    Diagnosis Plan   1. Wide-complex tachycardia (CMS/HCC)  metoprolol tartrate (LOPRESSOR) 100 MG tablet    Ambulatory Referral to Cardiac Electrophysiology   2. Palpitations     3. Precordial chest pain     4. Essential hypertension     5. Mixed hyperlipidemia  rosuvastatin (CRESTOR) 20 MG tablet    NMR LipoProfile    Comprehensive Metabolic Panel   6. Snoring     1. I discussed result of event monitor, still with runs of WCT, will increase metoprolol to 100 mg bid, will refer to EP for assessment  2. I discussed stress test result, negative for ischemia, she has constant chest pain for almost a year, possible musculoskeletal  3. She was told by her PCP that her sleep study was negative, result is not available to me  4. I reviewed labs, with very high small LDL-P, will start rosuvastatin  5. Blood pressure is borderline, metoprolol increased    Return in about 3 months (around 6/9/2021).    Diagnoses and all orders for this visit:    1. Wide-complex tachycardia (CMS/HCC) (Primary)  -      Discontinue: metoprolol tartrate (LOPRESSOR) 100 MG tablet; Take 1 tablet by mouth 2 (Two) Times a Day.  Dispense: 180 tablet; Refill: 1  -     metoprolol tartrate (LOPRESSOR) 100 MG tablet; Take 1 tablet by mouth 2 (Two) Times a Day.  Dispense: 180 tablet; Refill: 1  -     Ambulatory Referral to Cardiac Electrophysiology    2. Palpitations    3. Precordial chest pain    4. Essential hypertension    5. Mixed hyperlipidemia  -     rosuvastatin (CRESTOR) 20 MG tablet; Take 1 tablet by mouth Daily.  Dispense: 90 tablet; Refill: 3  -     NMR LipoProfile; Future  -     Comprehensive Metabolic Panel; Future    6. Snoring                   MEDS ORDERED DURING VISIT:  New Medications Ordered This Visit   Medications   • rosuvastatin (CRESTOR) 20 MG tablet     Sig: Take 1 tablet by mouth Daily.     Dispense:  90 tablet     Refill:  3   • metoprolol tartrate (LOPRESSOR) 100 MG tablet     Sig: Take 1 tablet by mouth 2 (Two) Times a Day.     Dispense:  180 tablet     Refill:  1         This document has been electronically signed by Shilpa Mcgraw MD  March 9, 2021 11:12 EST

## 2021-03-15 ENCOUNTER — TELEPHONE (OUTPATIENT)
Dept: CARDIOLOGY | Facility: CLINIC | Age: 46
End: 2021-03-15

## 2021-03-15 NOTE — TELEPHONE ENCOUNTER
Made pt an apt for tomorrow at 10:30 am with . called pt and advised her. She expressed understanding.

## 2021-03-15 NOTE — TELEPHONE ENCOUNTER
Please call patient who states her heart is still racing despite the new medication.  Patient states she doesn't have any readings to report.

## 2021-03-16 ENCOUNTER — OFFICE VISIT (OUTPATIENT)
Dept: CARDIOLOGY | Facility: CLINIC | Age: 46
End: 2021-03-16

## 2021-03-16 VITALS
HEART RATE: 68 BPM | DIASTOLIC BLOOD PRESSURE: 79 MMHG | WEIGHT: 195.8 LBS | SYSTOLIC BLOOD PRESSURE: 120 MMHG | BODY MASS INDEX: 31.47 KG/M2 | HEIGHT: 66 IN | TEMPERATURE: 98.3 F

## 2021-03-16 DIAGNOSIS — E78.2 MIXED HYPERLIPIDEMIA: ICD-10-CM

## 2021-03-16 DIAGNOSIS — R00.2 PALPITATIONS: ICD-10-CM

## 2021-03-16 DIAGNOSIS — I10 ESSENTIAL HYPERTENSION: ICD-10-CM

## 2021-03-16 DIAGNOSIS — R00.0 WIDE-COMPLEX TACHYCARDIA: Primary | ICD-10-CM

## 2021-03-16 DIAGNOSIS — R07.2 PRECORDIAL CHEST PAIN: ICD-10-CM

## 2021-03-16 PROCEDURE — 99214 OFFICE O/P EST MOD 30 MIN: CPT | Performed by: SPECIALIST

## 2021-03-16 NOTE — PROGRESS NOTES
Subjective   Follow up, palpitations  Iraida Heard is a 45 y.o. female who presents to day for Follow-up, Med Management (VERBAL), Palpitations (RACING), and Fatigue.    CHIEF COMPLIANT  Chief Complaint   Patient presents with   • Follow-up   • Med Management     VERBAL   • Palpitations     RACING   • Fatigue       Active Problems:  Problem List Items Addressed This Visit        Cardiac and Vasculature    Precordial chest pain    Relevant Orders    Sedimentation Rate    CK    Aldolase    Palpitations    Hypertension    Wide-complex tachycardia (CMS/HCC) - Primary    Mixed hyperlipidemia    Relevant Orders    NMR LipoProfile    Comprehensive Metabolic Panel          HPI  HPI  Feels fatigued, still having constant chest pain, stable shortness of breath to moderate exertion, no edema, still with palpitations, almost daily, no dizziness  PRIOR MEDS  Current Outpatient Medications on File Prior to Visit   Medication Sig Dispense Refill   • ALPRAZolam (XANAX) 1 MG tablet TAKE 1 TABLET BY MOUTH AT NIGHT     • busPIRone (BUSPAR) 5 MG tablet TAKE 1 TABLET BY MOUTH THREE (3) TIMES DAILY     • Calcium Polycarbophil (FIBER LAXATIVE PO) Take  by mouth.     • Cranberry 125 MG tablet Take  by mouth Daily.     • diclofenac (VOLTAREN) 75 MG EC tablet      • dicyclomine (BENTYL) 20 MG tablet Take  by mouth Every 8 (Eight) Hours.     • estradiol (ESTRACE) 1 MG tablet Take 1 mg by mouth Daily.     • gabapentin (NEURONTIN) 100 MG capsule 100 mg 2 (two) times a day. Takes 1cap. A.m. 2 caps p.m.     • metoprolol tartrate (LOPRESSOR) 100 MG tablet Take 1 tablet by mouth 2 (Two) Times a Day. 180 tablet 1   • omeprazole (priLOSEC) 20 MG capsule Take 20 mg by mouth Daily.     • Probiotic Product (Risaquad-2) capsule capsule Take 1 capsule by mouth Daily.     • rosuvastatin (CRESTOR) 20 MG tablet Take 1 tablet by mouth Daily. 90 tablet 3   • traMADol (ULTRAM) 50 MG tablet take 1 tablet by oral route every night as needed     • vitamin B-12  "(CYANOCOBALAMIN) 1000 MCG tablet Take 1,000 mcg by mouth Daily.       No current facility-administered medications on file prior to visit.       ALLERGIES  Patient has no known allergies.    HISTORY  Past Medical History:   Diagnosis Date   • Arthritis    • Hypertension 2/9/2021       Social History     Socioeconomic History   • Marital status:      Spouse name: Not on file   • Number of children: Not on file   • Years of education: Not on file   • Highest education level: Not on file   Tobacco Use   • Smoking status: Never Smoker   • Smokeless tobacco: Never Used   Substance and Sexual Activity   • Alcohol use: Never   • Drug use: Never   • Sexual activity: Defer       Family History   Problem Relation Age of Onset   • Breast cancer Maternal Grandmother         60's   • Cancer Mother         kidney   • Diabetes Mother    • Hypertension Father    • Heart disease Father        Review of Systems   Constitutional: Negative for activity change and appetite change.   HENT: Negative for congestion.    Eyes: Negative for discharge and itching.   Respiratory: Positive for shortness of breath. Negative for apnea, cough, choking, chest tightness, wheezing and stridor.    Cardiovascular: Positive for chest pain and palpitations. Negative for leg swelling.   Gastrointestinal: Negative for abdominal distention and abdominal pain.   Endocrine: Negative for cold intolerance and heat intolerance.   Genitourinary: Negative for flank pain.   Musculoskeletal: Negative for gait problem.   Skin: Negative for color change and pallor.   Neurological: Negative for facial asymmetry.   Psychiatric/Behavioral: Negative for agitation and behavioral problems.       Objective     VITALS: /79 (BP Location: Left arm, Patient Position: Sitting)   Pulse 68   Temp 98.3 °F (36.8 °C)   Ht 167.6 cm (66\")   Wt 88.8 kg (195 lb 12.8 oz)   BMI 31.60 kg/m²     LABS:   Lab Results (most recent)     None          IMAGING:   XR Chest 1 " View    Result Date: 2/25/2021    Unremarkable exam. No acute cardiopulmonary findings identified.  This report was finalized on 2/25/2021 11:59 AM by Dr. Ivan Matthews MD.      US Breast Right Limited    Result Date: 1/26/2021  Benign right breast imaging findings.  FINAL BI-RADS PATIENT ASSESSMENT CATEGORY: 2, BENIGN  RECOMMENDATION:  Recommend the patient resume routine annual screening mammography.  The patient was notified of the results and recommendations at the time of her visit.  Additionally, a letter, in lay terminology, with the results of this exam will be mailed to the patient.  This report was finalized on 1/26/2021 10:11 AM by Dr. Migdalia Alvarez MD.      Mammo Screening Digital Tomosynthesis Bilateral With CAD    Result Date: 1/22/2021  1. Stable mammographic appearance of the left breast with no findings suspicious for malignancy. 2. Oval mass in the right subareolar region. Recommend additional imaging.   BI-RADS CATEGORY:  0, INCOMPLETE: Need additional imaging evaluation.  RECOMMENDATION:  Focused ultrasound evaluation of the right breast.  CAD was utilized.  The standard false-negative rate of mammography is between 10% and 25%. Complex patterns or increased breast density will markedly elevate the false-negative rate of mammography.    A letter, in lay terminology, with the results of this exam will be mailed to the patient.   The patient will be contacted by our office to schedule for the additional imaging evaluation.   This report was finalized on 1/22/2021 2:03 PM by Dr. Migdalia Alvarez MD.        EXAM:  Physical Exam  Vitals reviewed.   Constitutional:       Appearance: She is well-developed.   HENT:      Head: Normocephalic and atraumatic.   Eyes:      Pupils: Pupils are equal, round, and reactive to light.   Neck:      Thyroid: No thyromegaly.      Vascular: No JVD.   Cardiovascular:      Rate and Rhythm: Normal rate and regular rhythm.      Heart sounds: Normal heart sounds. No  murmur. No friction rub. No gallop.    Pulmonary:      Effort: Pulmonary effort is normal. No respiratory distress.      Breath sounds: Normal breath sounds. No stridor. No wheezing or rales.   Chest:      Chest wall: No tenderness.   Musculoskeletal:         General: No tenderness or deformity.      Cervical back: Neck supple.   Skin:     General: Skin is warm and dry.   Neurological:      Mental Status: She is alert and oriented to person, place, and time.      Cranial Nerves: No cranial nerve deficit.      Coordination: Coordination normal.         Procedure   Procedures       Assessment/Plan    Diagnosis Plan   1. Wide-complex tachycardia (CMS/HCC)     2. Precordial chest pain  Sedimentation Rate    CK    Aldolase   3. Palpitations     4. Essential hypertension     5. Mixed hyperlipidemia  NMR LipoProfile    Comprehensive Metabolic Panel   1. Patient is still very symptomatic with palpitations, according to her no difference with the metoprolol, she will Dr Bolaños next week, doubt will be able to increase metoprolol as her rate in lower 60`s, will not start other medications as sotalol pending EP evaluation  2. Constant chest pain and arm pain for almost a year, now she also says these areas are very tender for many months ( well before starting a statin ), she may need imaging studies of her cervical and thoracic spine, will get CK, aldolase, and sed levels  3. Blood pressure is controller, will continue current management  4. Will get lipid profile prior to next visit    Return in about 4 weeks (around 4/13/2021).    Diagnoses and all orders for this visit:    1. Wide-complex tachycardia (CMS/HCC) (Primary)    2. Precordial chest pain  -     Sedimentation Rate; Future  -     CK; Future  -     Aldolase; Future    3. Palpitations    4. Essential hypertension    5. Mixed hyperlipidemia  -     NMR LipoProfile; Future  -     Comprehensive Metabolic Panel; Future                     Patient's Body mass index is  31.6 kg/m². BMI is above normal parameters. Recommendations include: educational material.           MEDS ORDERED DURING VISIT:  No orders of the defined types were placed in this encounter.        This document has been electronically signed by Shilpa Mcgraw MD  March 16, 2021 11:16 EDT

## 2021-03-22 ENCOUNTER — CONSULT (OUTPATIENT)
Dept: CARDIOLOGY | Facility: CLINIC | Age: 46
End: 2021-03-22

## 2021-03-22 VITALS
HEIGHT: 66 IN | BODY MASS INDEX: 31.18 KG/M2 | DIASTOLIC BLOOD PRESSURE: 90 MMHG | SYSTOLIC BLOOD PRESSURE: 128 MMHG | OXYGEN SATURATION: 99 % | HEART RATE: 62 BPM | WEIGHT: 194 LBS

## 2021-03-22 DIAGNOSIS — I10 ESSENTIAL HYPERTENSION: ICD-10-CM

## 2021-03-22 DIAGNOSIS — G47.33 OBSTRUCTIVE SLEEP APNEA: ICD-10-CM

## 2021-03-22 DIAGNOSIS — R06.81 APNEA: ICD-10-CM

## 2021-03-22 DIAGNOSIS — I47.29 NONSUSTAINED VENTRICULAR TACHYCARDIA (HCC): Primary | ICD-10-CM

## 2021-03-22 DIAGNOSIS — R07.89 CHEST PAIN, ATYPICAL: ICD-10-CM

## 2021-03-22 PROCEDURE — 99204 OFFICE O/P NEW MOD 45 MIN: CPT | Performed by: INTERNAL MEDICINE

## 2021-03-22 PROCEDURE — 93000 ELECTROCARDIOGRAM COMPLETE: CPT | Performed by: NURSE PRACTITIONER

## 2021-03-23 ENCOUNTER — TELEPHONE (OUTPATIENT)
Dept: CARDIOLOGY | Facility: CLINIC | Age: 46
End: 2021-03-23

## 2021-03-23 NOTE — TELEPHONE ENCOUNTER
"Patient is concerned after her visit with EP and feels that he did not take her symptoms seriously.  She sated that he asked her to d/c metoprolol because she didn't need to be on this med and that he chest pain was actually\"rib pain\" and she was voiced concern that he wanted to redo her sleep apnea test as this has been negative twice in the past    Patient advised that she had the right to refuse any test but I would address her concerns with DR Mcgraw.  Patient also stated she did not want to stop metoprolol and she did not plan on doing so  "

## 2021-04-30 ENCOUNTER — OFFICE VISIT (OUTPATIENT)
Dept: FAMILY MEDICINE CLINIC | Facility: CLINIC | Age: 46
End: 2021-04-30

## 2021-04-30 DIAGNOSIS — Z00.00 HEALTHCARE MAINTENANCE: ICD-10-CM

## 2021-04-30 DIAGNOSIS — E78.2 MIXED HYPERLIPIDEMIA: ICD-10-CM

## 2021-04-30 DIAGNOSIS — I47.29 NONSUSTAINED VENTRICULAR TACHYCARDIA (HCC): ICD-10-CM

## 2021-04-30 DIAGNOSIS — I10 ESSENTIAL HYPERTENSION: ICD-10-CM

## 2021-04-30 DIAGNOSIS — R07.2 PRECORDIAL CHEST PAIN: ICD-10-CM

## 2021-04-30 DIAGNOSIS — G47.33 OSA (OBSTRUCTIVE SLEEP APNEA): ICD-10-CM

## 2021-04-30 DIAGNOSIS — M81.0 AGE-RELATED OSTEOPOROSIS WITHOUT CURRENT PATHOLOGICAL FRACTURE: ICD-10-CM

## 2021-04-30 DIAGNOSIS — R23.2 HOT FLASHES: ICD-10-CM

## 2021-04-30 DIAGNOSIS — F41.0 PANIC ATTACKS: Primary | ICD-10-CM

## 2021-04-30 PROBLEM — R06.83 SNORING: Status: RESOLVED | Noted: 2021-03-09 | Resolved: 2021-04-30

## 2021-04-30 PROBLEM — R00.2 PALPITATIONS: Status: RESOLVED | Noted: 2021-02-09 | Resolved: 2021-04-30

## 2021-04-30 PROBLEM — R07.89 CHEST PAIN, ATYPICAL: Status: RESOLVED | Noted: 2021-03-22 | Resolved: 2021-04-30

## 2021-04-30 PROBLEM — M79.621 AXILLARY PAIN, RIGHT: Status: RESOLVED | Noted: 2020-02-07 | Resolved: 2021-04-30

## 2021-04-30 PROCEDURE — 99204 OFFICE O/P NEW MOD 45 MIN: CPT | Performed by: GENERAL PRACTICE

## 2021-04-30 RX ORDER — FLUOXETINE HYDROCHLORIDE 20 MG/1
20 CAPSULE ORAL EVERY MORNING
Qty: 90 CAPSULE | Refills: 3 | Status: SHIPPED | OUTPATIENT
Start: 2021-04-30 | End: 2022-04-11 | Stop reason: SDUPTHER

## 2021-05-01 VITALS
RESPIRATION RATE: 14 BRPM | DIASTOLIC BLOOD PRESSURE: 62 MMHG | OXYGEN SATURATION: 96 % | HEART RATE: 86 BPM | WEIGHT: 198 LBS | HEIGHT: 66 IN | BODY MASS INDEX: 31.82 KG/M2 | SYSTOLIC BLOOD PRESSURE: 108 MMHG | TEMPERATURE: 97.1 F

## 2021-05-01 PROBLEM — Z00.00 HEALTHCARE MAINTENANCE: Status: ACTIVE | Noted: 2021-05-01

## 2021-05-01 NOTE — PROGRESS NOTES
Subjective   Iraida Heard is a 46 y.o. female.     History of Present Illness     This 46-year-old woman presents today to establish care.    Chest Pain  She gives a 1 year history of intermittent chest pain.  This occurs every day or two and lasts hours to all day.  The pain is described as a sharp left upper ache.  The pain does not radiate elsewhere.  While she admits to intermittent palpitations these are not consistently associated with her pain.  She also admits to significant fatigue.  There is no history of any shortness of breath, lightheadedness, calf pain, or some of the ankles and she denies any cough, hemoptysis, fever, or chills.  Nuclear stress testing and an echocardiogram performed on 3/4/2021 were unremarkable.  Cardiac event monitoring performed between 2/17/2021 and 3/2/2021 revealed a 12 beat episode of ventricular tachycardia.  She is currently on metoprolol but does not feel that it has helped with her symptoms and has noted a marked increase in her fatigue.  She continues to be followed by cardiology    Hyperlipidemia  She has been following an appropriate diet but admits to getting less exercise over the last few years with a 10 to 15 pound weight gain.  She is currently on rosuvastatin with no apparent side effects.    Panic Attacks  She admits to intermittent episodes of nervousness, worrying, flushing, and diaphoresis.  She denies persistent anxiety and there is no history of depression, loss of interest in activities, or suicide ideation.  She is currently prescribed buspirone and alprazolam but does not feel that either of helped significantly.    The following portions of the patient's history were reviewed and updated as appropriate: allergies, current medications, past family history, past medical history, past social history, past surgical history and problem list.    Review of Systems   Constitutional: Positive for fatigue. Negative for appetite change, chills, fever and  unexpected weight change.   HENT: Negative for congestion, ear pain, rhinorrhea, sneezing and sore throat.    Eyes: Negative for visual disturbance.   Respiratory: Negative for cough, shortness of breath and wheezing.    Cardiovascular: Positive for chest pain and palpitations. Negative for leg swelling.   Gastrointestinal: Negative for abdominal pain, blood in stool, constipation, diarrhea, nausea and vomiting.   Genitourinary: Negative for difficulty urinating, dysuria, frequency, hematuria and urgency.   Musculoskeletal: Negative for arthralgias, back pain, joint swelling, myalgias and neck pain.   Skin: Negative for rash.   Neurological: Negative for weakness, numbness and headaches.   Psychiatric/Behavioral: Positive for sleep disturbance. Negative for dysphoric mood and suicidal ideas. The patient is nervous/anxious.      Objective   Physical Exam  Constitutional:       General: She is not in acute distress.     Appearance: Normal appearance. She is well-developed. She is not diaphoretic.      Comments: Accompanied by her mother. Bright and in good spirits. No apparent distress. No pallor, jaundice, diaphoresis, or cyanosis.   HENT:      Head: Atraumatic.      Right Ear: Tympanic membrane, ear canal and external ear normal.      Left Ear: Tympanic membrane, ear canal and external ear normal.   Eyes:      Conjunctiva/sclera: Conjunctivae normal.   Neck:      Thyroid: No thyroid mass or thyromegaly.      Vascular: No carotid bruit or JVD.      Trachea: Trachea normal. No tracheal deviation.   Cardiovascular:      Rate and Rhythm: Normal rate and regular rhythm.      Heart sounds: Normal heart sounds, S1 normal and S2 normal. No murmur heard.   No gallop.    Pulmonary:      Effort: Pulmonary effort is normal.      Breath sounds: Normal breath sounds.   Abdominal:      General: Bowel sounds are normal. There is no distension or abdominal bruit.      Palpations: Abdomen is soft. There is no hepatomegaly,  splenomegaly or mass.      Tenderness: There is no abdominal tenderness.      Hernia: No hernia is present.   Musculoskeletal:      Right lower leg: No edema.      Left lower leg: No edema.   Lymphadenopathy:      Head:      Right side of head: No submental, submandibular, tonsillar, preauricular, posterior auricular or occipital adenopathy.      Left side of head: No submental, submandibular, tonsillar, preauricular, posterior auricular or occipital adenopathy.      Cervical: No cervical adenopathy.      Upper Body:      Right upper body: No supraclavicular adenopathy.      Left upper body: No supraclavicular adenopathy.   Skin:     General: Skin is warm.      Coloration: Skin is not cyanotic, jaundiced or pale.      Findings: No rash.      Nails: There is no clubbing.   Neurological:      Mental Status: She is alert and oriented to person, place, and time.      Cranial Nerves: No cranial nerve deficit.      Motor: No tremor.      Coordination: Coordination normal.      Gait: Gait normal.   Psychiatric:         Attention and Perception: Attention normal.         Mood and Affect: Mood normal.         Speech: Speech normal.         Behavior: Behavior normal.         Thought Content: Thought content normal.       Assessment/Plan   Problems Addressed this Visit        Cardiac and Vasculature    Essential hypertension   Hypertension: at goal. Evidence of target organ damage: none.  Encouraged to continue to work on diet and exercise plan.   Continue current medication for now  If cardiology agrees will continue metoprolol taper and consider a trial of verapamil or diltiazem if her blood pressure warrants    Relevant Orders    CBC & Differential    Comprehensive Metabolic Panel    Mixed hyperlipidemia  As above.  Continue current medication  Scheduled for updated labs    Relevant Orders    Lipid Panel    Nonsustained ventricular tachycardia (CMS/HCC)  Follow up with cardiology     Precordial chest  pain  Atypical  Discontinue dicyclomine and gabapentin  Follow up with cardiology     Relevant Orders    Sedimentation Rate    C-reactive Protein    Aldolase       Health Encounters    Healthcare maintenance  Patient has received both doses of the COVID-19 vaccine.  Recommended a Pneumovax 23 at a later date as well as a DEXA scan with her next mammogram    Relevant Orders    Vitamin D 25 Hydroxy    Vitamin B12    Hepatitis C Antibody       Mental Health    Panic attacks   Reviewed options and agreed on a trial of fluoxetine in place of buspirone and alprazolam    Relevant Medications    FLUoxetine (PROzac) 20 MG capsule    Other Relevant Orders    TSH    T4, Free    T3, Free       Sleep    KATALINA (obstructive sleep apnea)       Symptoms and Signs    Hot flashes  We will discontinue estradiol and check an FSH and LH with her next labs    Relevant Orders    FSH & LH         Diagnoses       Codes Comments    Panic attacks    -  Primary ICD-10-CM: F41.0  ICD-9-CM: 300.01     Essential hypertension     ICD-10-CM: I10  ICD-9-CM: 401.9     Mixed hyperlipidemia     ICD-10-CM: E78.2  ICD-9-CM: 272.2     Nonsustained ventricular tachycardia (CMS/HCC)     ICD-10-CM: I47.2  ICD-9-CM: 427.1     Precordial chest pain     ICD-10-CM: R07.2  ICD-9-CM: 786.51     KATALINA (obstructive sleep apnea)     ICD-10-CM: G47.33  ICD-9-CM: 327.23     Healthcare maintenance     ICD-10-CM: Z00.00  ICD-9-CM: V70.0     Hot flashes     ICD-10-CM: R23.2  ICD-9-CM: 782.62     Age-related osteoporosis without current pathological fracture      ICD-10-CM: M81.0  ICD-9-CM: 733.01

## 2021-05-03 ENCOUNTER — LAB (OUTPATIENT)
Dept: FAMILY MEDICINE CLINIC | Facility: CLINIC | Age: 46
End: 2021-05-03

## 2021-05-03 DIAGNOSIS — F41.0 PANIC ATTACKS: ICD-10-CM

## 2021-05-03 DIAGNOSIS — Z00.00 HEALTHCARE MAINTENANCE: ICD-10-CM

## 2021-05-03 DIAGNOSIS — R07.2 PRECORDIAL CHEST PAIN: ICD-10-CM

## 2021-05-03 DIAGNOSIS — I10 ESSENTIAL HYPERTENSION: ICD-10-CM

## 2021-05-03 DIAGNOSIS — M81.0 AGE-RELATED OSTEOPOROSIS WITHOUT CURRENT PATHOLOGICAL FRACTURE: ICD-10-CM

## 2021-05-03 DIAGNOSIS — R23.2 HOT FLASHES: ICD-10-CM

## 2021-05-03 DIAGNOSIS — E78.2 MIXED HYPERLIPIDEMIA: ICD-10-CM

## 2021-05-04 LAB
25(OH)D3+25(OH)D2 SERPL-MCNC: 37.4 NG/ML (ref 30–100)
ALBUMIN SERPL-MCNC: 4.3 G/DL (ref 3.5–5.2)
ALBUMIN SERPL-MCNC: 4.4 G/DL (ref 3.5–5.2)
ALBUMIN/GLOB SERPL: 1.7 G/DL
ALBUMIN/GLOB SERPL: 1.8 G/DL
ALDOLASE SERPL-CCNC: 7.1 U/L (ref 3.3–10.3)
ALP SERPL-CCNC: 56 U/L (ref 39–117)
ALP SERPL-CCNC: 56 U/L (ref 39–117)
ALT SERPL-CCNC: 32 U/L (ref 1–33)
ALT SERPL-CCNC: 33 U/L (ref 1–33)
AST SERPL-CCNC: 45 U/L (ref 1–32)
AST SERPL-CCNC: 46 U/L (ref 1–32)
BASOPHILS # BLD AUTO: 0.03 10*3/MM3 (ref 0–0.2)
BASOPHILS NFR BLD AUTO: 0.5 % (ref 0–1.5)
BILIRUB SERPL-MCNC: 0.4 MG/DL (ref 0–1.2)
BILIRUB SERPL-MCNC: 0.4 MG/DL (ref 0–1.2)
BUN SERPL-MCNC: 15 MG/DL (ref 6–20)
BUN SERPL-MCNC: 15 MG/DL (ref 6–20)
BUN/CREAT SERPL: 18.3 (ref 7–25)
BUN/CREAT SERPL: 18.5 (ref 7–25)
CALCIUM SERPL-MCNC: 9.3 MG/DL (ref 8.6–10.5)
CALCIUM SERPL-MCNC: 9.3 MG/DL (ref 8.6–10.5)
CHLORIDE SERPL-SCNC: 102 MMOL/L (ref 98–107)
CHLORIDE SERPL-SCNC: 104 MMOL/L (ref 98–107)
CHOLEST SERPL-MCNC: 148 MG/DL (ref 0–200)
CK SERPL-CCNC: 110 U/L (ref 20–180)
CO2 SERPL-SCNC: 24.5 MMOL/L (ref 22–29)
CO2 SERPL-SCNC: 25.8 MMOL/L (ref 22–29)
CREAT SERPL-MCNC: 0.81 MG/DL (ref 0.57–1)
CREAT SERPL-MCNC: 0.82 MG/DL (ref 0.57–1)
CRP SERPL-MCNC: 0.32 MG/DL (ref 0–0.5)
EOSINOPHIL # BLD AUTO: 0.12 10*3/MM3 (ref 0–0.4)
EOSINOPHIL NFR BLD AUTO: 2.1 % (ref 0.3–6.2)
ERYTHROCYTE [DISTWIDTH] IN BLOOD BY AUTOMATED COUNT: 12.7 % (ref 12.3–15.4)
ERYTHROCYTE [SEDIMENTATION RATE] IN BLOOD BY WESTERGREN METHOD: 5 MM/HR (ref 0–20)
ERYTHROCYTE [SEDIMENTATION RATE] IN BLOOD BY WESTERGREN METHOD: 6 MM/HR (ref 0–20)
FSH SERPL-ACNC: 7.6 MIU/ML
GLOBULIN SER CALC-MCNC: 2.4 GM/DL
GLOBULIN SER CALC-MCNC: 2.5 GM/DL
GLUCOSE SERPL-MCNC: 123 MG/DL (ref 65–99)
GLUCOSE SERPL-MCNC: 124 MG/DL (ref 65–99)
HCT VFR BLD AUTO: 39.5 % (ref 34–46.6)
HCV AB S/CO SERPL IA: 0.6 S/CO RATIO (ref 0–0.9)
HDLC SERPL-MCNC: 43 MG/DL (ref 40–60)
HGB BLD-MCNC: 13.5 G/DL (ref 12–15.9)
IMM GRANULOCYTES # BLD AUTO: 0.02 10*3/MM3 (ref 0–0.05)
IMM GRANULOCYTES NFR BLD AUTO: 0.4 % (ref 0–0.5)
LDLC SERPL CALC-MCNC: 80 MG/DL (ref 0–100)
LH SERPL-ACNC: 9.8 MIU/ML
LYMPHOCYTES # BLD AUTO: 1.96 10*3/MM3 (ref 0.7–3.1)
LYMPHOCYTES NFR BLD AUTO: 35 % (ref 19.6–45.3)
MCH RBC QN AUTO: 32.7 PG (ref 26.6–33)
MCHC RBC AUTO-ENTMCNC: 34.2 G/DL (ref 31.5–35.7)
MCV RBC AUTO: 95.6 FL (ref 79–97)
MONOCYTES # BLD AUTO: 0.49 10*3/MM3 (ref 0.1–0.9)
MONOCYTES NFR BLD AUTO: 8.8 % (ref 5–12)
NEUTROPHILS # BLD AUTO: 2.98 10*3/MM3 (ref 1.7–7)
NEUTROPHILS NFR BLD AUTO: 53.2 % (ref 42.7–76)
NRBC BLD AUTO-RTO: 0 /100 WBC (ref 0–0.2)
PLATELET # BLD AUTO: 256 10*3/MM3 (ref 140–450)
POTASSIUM SERPL-SCNC: 4.3 MMOL/L (ref 3.5–5.2)
POTASSIUM SERPL-SCNC: 4.5 MMOL/L (ref 3.5–5.2)
PROT SERPL-MCNC: 6.8 G/DL (ref 6–8.5)
PROT SERPL-MCNC: 6.8 G/DL (ref 6–8.5)
RBC # BLD AUTO: 4.13 10*6/MM3 (ref 3.77–5.28)
SODIUM SERPL-SCNC: 138 MMOL/L (ref 136–145)
SODIUM SERPL-SCNC: 140 MMOL/L (ref 136–145)
T3FREE SERPL-MCNC: 3.1 PG/ML (ref 2–4.4)
T4 FREE SERPL-MCNC: 0.83 NG/DL (ref 0.93–1.7)
TRIGL SERPL-MCNC: 143 MG/DL (ref 0–150)
TSH SERPL DL<=0.005 MIU/L-ACNC: 1.94 UIU/ML (ref 0.27–4.2)
VIT B12 SERPL-MCNC: 1604 PG/ML (ref 211–946)
VLDLC SERPL CALC-MCNC: 25 MG/DL (ref 5–40)
WBC # BLD AUTO: 5.6 10*3/MM3 (ref 3.4–10.8)

## 2021-05-06 RX ORDER — OMEPRAZOLE 20 MG/1
20 CAPSULE, DELAYED RELEASE ORAL DAILY
Qty: 90 CAPSULE | Refills: 3 | Status: SHIPPED | OUTPATIENT
Start: 2021-05-06 | End: 2021-12-13

## 2021-05-10 ENCOUNTER — LAB (OUTPATIENT)
Dept: LAB | Facility: HOSPITAL | Age: 46
End: 2021-05-10

## 2021-05-10 PROCEDURE — 80061 LIPID PANEL: CPT | Performed by: SPECIALIST

## 2021-05-10 PROCEDURE — 83704 LIPOPROTEIN BLD QUAN PART: CPT | Performed by: SPECIALIST

## 2021-05-18 ENCOUNTER — OFFICE VISIT (OUTPATIENT)
Dept: FAMILY MEDICINE CLINIC | Facility: CLINIC | Age: 46
End: 2021-05-18

## 2021-05-18 VITALS
TEMPERATURE: 96.4 F | HEART RATE: 80 BPM | WEIGHT: 189 LBS | HEIGHT: 66 IN | DIASTOLIC BLOOD PRESSURE: 80 MMHG | SYSTOLIC BLOOD PRESSURE: 120 MMHG | OXYGEN SATURATION: 95 % | BODY MASS INDEX: 30.37 KG/M2

## 2021-05-18 DIAGNOSIS — M54.31 RIGHT SCIATIC NERVE PAIN: Primary | ICD-10-CM

## 2021-05-18 PROCEDURE — 99213 OFFICE O/P EST LOW 20 MIN: CPT | Performed by: NURSE PRACTITIONER

## 2021-05-18 PROCEDURE — 96372 THER/PROPH/DIAG INJ SC/IM: CPT | Performed by: NURSE PRACTITIONER

## 2021-05-18 RX ORDER — KETOROLAC TROMETHAMINE 10 MG/1
10 TABLET, FILM COATED ORAL EVERY 6 HOURS PRN
Qty: 12 TABLET | Refills: 0 | Status: SHIPPED | OUTPATIENT
Start: 2021-05-18 | End: 2021-06-08 | Stop reason: ALTCHOICE

## 2021-05-18 RX ORDER — PREDNISONE 20 MG/1
TABLET ORAL
Qty: 6 TABLET | Refills: 0 | Status: SHIPPED | OUTPATIENT
Start: 2021-05-18 | End: 2021-05-28

## 2021-05-18 RX ORDER — METHYLPREDNISOLONE ACETATE 80 MG/ML
80 INJECTION, SUSPENSION INTRA-ARTICULAR; INTRALESIONAL; INTRAMUSCULAR; SOFT TISSUE ONCE
Status: COMPLETED | OUTPATIENT
Start: 2021-05-18 | End: 2021-05-18

## 2021-05-18 RX ORDER — KETOROLAC TROMETHAMINE 30 MG/ML
60 INJECTION, SOLUTION INTRAMUSCULAR; INTRAVENOUS ONCE
Status: COMPLETED | OUTPATIENT
Start: 2021-05-18 | End: 2021-05-18

## 2021-05-18 RX ADMIN — KETOROLAC TROMETHAMINE 60 MG: 30 INJECTION, SOLUTION INTRAMUSCULAR; INTRAVENOUS at 16:38

## 2021-05-18 RX ADMIN — METHYLPREDNISOLONE ACETATE 80 MG: 80 INJECTION, SUSPENSION INTRA-ARTICULAR; INTRALESIONAL; INTRAMUSCULAR; SOFT TISSUE at 16:39

## 2021-05-19 ENCOUNTER — HOSPITAL ENCOUNTER (OUTPATIENT)
Dept: GENERAL RADIOLOGY | Facility: HOSPITAL | Age: 46
Discharge: HOME | End: 2021-05-19
Admitting: NURSE PRACTITIONER

## 2021-05-19 PROCEDURE — 72100 X-RAY EXAM L-S SPINE 2/3 VWS: CPT | Performed by: RADIOLOGY

## 2021-05-19 PROCEDURE — 72100 X-RAY EXAM L-S SPINE 2/3 VWS: CPT

## 2021-06-07 ENCOUNTER — OFFICE VISIT (OUTPATIENT)
Dept: FAMILY MEDICINE CLINIC | Facility: CLINIC | Age: 46
End: 2021-06-07

## 2021-06-07 VITALS
RESPIRATION RATE: 14 BRPM | SYSTOLIC BLOOD PRESSURE: 128 MMHG | BODY MASS INDEX: 28.93 KG/M2 | HEART RATE: 83 BPM | HEIGHT: 66 IN | OXYGEN SATURATION: 96 % | DIASTOLIC BLOOD PRESSURE: 72 MMHG | WEIGHT: 180 LBS | TEMPERATURE: 97.1 F

## 2021-06-07 DIAGNOSIS — I10 ESSENTIAL HYPERTENSION: ICD-10-CM

## 2021-06-07 DIAGNOSIS — R23.2 HOT FLASHES: ICD-10-CM

## 2021-06-07 DIAGNOSIS — Z00.00 HEALTHCARE MAINTENANCE: ICD-10-CM

## 2021-06-07 DIAGNOSIS — I47.29 NONSUSTAINED VENTRICULAR TACHYCARDIA (HCC): Primary | ICD-10-CM

## 2021-06-07 DIAGNOSIS — E78.2 MIXED HYPERLIPIDEMIA: ICD-10-CM

## 2021-06-07 DIAGNOSIS — M54.59 MECHANICAL LOW BACK PAIN: ICD-10-CM

## 2021-06-07 DIAGNOSIS — R07.2 PRECORDIAL CHEST PAIN: ICD-10-CM

## 2021-06-07 DIAGNOSIS — R73.03 PREDIABETES: ICD-10-CM

## 2021-06-07 DIAGNOSIS — G47.33 OSA (OBSTRUCTIVE SLEEP APNEA): ICD-10-CM

## 2021-06-07 DIAGNOSIS — F41.0 PANIC ATTACKS: ICD-10-CM

## 2021-06-07 DIAGNOSIS — Z23 ENCOUNTER FOR IMMUNIZATION: ICD-10-CM

## 2021-06-07 PROCEDURE — G0009 ADMIN PNEUMOCOCCAL VACCINE: HCPCS | Performed by: GENERAL PRACTICE

## 2021-06-07 PROCEDURE — 99214 OFFICE O/P EST MOD 30 MIN: CPT | Performed by: GENERAL PRACTICE

## 2021-06-07 PROCEDURE — 90732 PPSV23 VACC 2 YRS+ SUBQ/IM: CPT | Performed by: GENERAL PRACTICE

## 2021-06-07 NOTE — PROGRESS NOTES
Subjective   Iraida Heard is a 46 y.o. female.     History of Present Illness     Chest Pain  She gives a 1 year history of intermittent chest pain.  This occurs every day or two and lasts hours to all day.  The pain is described as a sharp left upper ache.  The pain does not radiate elsewhere.  While she admits to intermittent palpitations these are not consistently associated with her pain.  She also admits to significant fatigue.  There is no history of any shortness of breath, lightheadedness, calf pain, or some of the ankles and she denies any cough, hemoptysis, fever, or chills.  Nuclear stress testing and an echocardiogram performed on 3/4/2021 were unremarkable.  Cardiac event monitoring performed between 2/17/2021 and 3/2/2021 revealed a 12 beat episode of ventricular tachycardia.  She remains on metoprolol but does not feel that it has helped with her symptoms and has noted a marked increase in her fatigue.  She is scheduled to undergo a cardiology follow-up with  tomorrow  Lab Results   Component Value Date    WBC 5.60 05/03/2021    HGB 13.5 05/03/2021    HCT 39.5 05/03/2021    MCV 95.6 05/03/2021     05/03/2021     Hyperlipidemia  She has been following an appropriate diet but admits to getting less exercise over the last few years with a 10 to 15 pound weight gain.  She remains on rosuvastatin with no apparent side effects.  Lab Results   Component Value Date    CHLPL 142 05/10/2021    TRIG 158 (H) 05/10/2021    HDL 43 05/03/2021    LDL 80 05/03/2021     Lab Results   Component Value Date    GLUCOSE 110 (H) 03/04/2021    BUN 15 05/03/2021    BUN 15 05/03/2021    CREATININE 0.81 05/03/2021    CREATININE 0.82 05/03/2021    EGFRIFNONA 76 05/03/2021    EGFRIFNONA 75 05/03/2021    EGFRIFAFRI 93 05/03/2021    EGFRIFAFRI 91 05/03/2021    BCR 18.5 05/03/2021    BCR 18.3 05/03/2021    K 4.3 05/03/2021    K 4.5 05/03/2021    CO2 24.5 05/03/2021    CO2 25.8 05/03/2021    CALCIUM 9.3 05/03/2021     CALCIUM 9.3 05/03/2021    PROTENTOTREF 6.8 05/03/2021    PROTENTOTREF 6.8 05/03/2021    ALBUMIN 4.30 05/03/2021    ALBUMIN 4.40 05/03/2021    LABIL2 1.7 05/03/2021    LABIL2 1.8 05/03/2021    AST 46 (H) 05/03/2021    AST 45 (H) 05/03/2021    ALT 33 05/03/2021    ALT 32 05/03/2021     Panic Attacks  She admits to intermittent episodes of nervousness, worrying, flushing, and diaphoresis.  She denies persistent anxiety and there is no history of depression, loss of interest in activities, or suicide ideation.  She is currently prescribed fluoxetine, buspirone and alprazolam  Lab Results   Component Value Date    TSH 1.940 05/03/2021     Low Back Pain  She gives a 4 to 5 year history of intermittent low back pain.  This has been worse over the last month.  There is no history of any recent strain or trauma nor any change in her activities.  The pain is described as a bilateral lower ache radiating to her right posterior thigh and calf.  The pain in her back has been worse than that in her leg.  She denies any weakness, numbness, or tingling and there is been no change in her bowel/bladder control.  The pain increases with activity improves with rest.  Plain films of the lumbar spine performed on 5/19/2021 were reported as showing mild degenerative disc disease at L4-5.  She has seen both a physical therapist and a chiropractor in the past with little improvement in her symptoms    Labs  Most recent vitamin D 37.4 with a B12 of 1604.  FSH returned at 7.6 with an LH of 9.8    The following portions of the patient's history were reviewed and updated as appropriate: allergies, current medications, past medical history, past social history and problem list.    Review of Systems   Constitutional: Positive for fatigue. Negative for appetite change, chills, fever and unexpected weight change.   HENT: Negative for congestion, ear pain, rhinorrhea, sneezing and sore throat.    Eyes: Negative for visual disturbance.   Respiratory:  Negative for cough, shortness of breath and wheezing.    Cardiovascular: Positive for chest pain and palpitations. Negative for leg swelling.   Gastrointestinal: Negative for abdominal pain, blood in stool, constipation, diarrhea, nausea and vomiting.   Genitourinary: Negative for difficulty urinating, dysuria, frequency, hematuria and urgency.   Musculoskeletal: Positive for back pain. Negative for arthralgias, joint swelling, myalgias and neck pain.   Skin: Negative for rash.   Neurological: Negative for weakness, numbness and headaches.   Psychiatric/Behavioral: Positive for sleep disturbance. Negative for dysphoric mood and suicidal ideas. The patient is nervous/anxious.      Objective   Physical Exam  Constitutional:       General: She is not in acute distress.     Appearance: Normal appearance. She is well-developed. She is not diaphoretic.      Comments: Accompanied by her mother. Bright and in good spirits. No apparent distress. No pallor, jaundice, diaphoresis, or cyanosis.   HENT:      Head: Atraumatic.      Right Ear: Tympanic membrane, ear canal and external ear normal.      Left Ear: Tympanic membrane, ear canal and external ear normal.   Eyes:      Conjunctiva/sclera: Conjunctivae normal.   Neck:      Thyroid: No thyroid mass or thyromegaly.      Vascular: No carotid bruit or JVD.      Trachea: Trachea normal. No tracheal deviation.   Cardiovascular:      Rate and Rhythm: Normal rate and regular rhythm.      Heart sounds: Normal heart sounds, S1 normal and S2 normal. No murmur heard.   No gallop.    Pulmonary:      Effort: Pulmonary effort is normal.      Breath sounds: Normal breath sounds.   Abdominal:      General: Bowel sounds are normal. There is no distension or abdominal bruit.      Palpations: Abdomen is soft. There is no hepatomegaly, splenomegaly or mass.      Tenderness: There is no abdominal tenderness.      Hernia: No hernia is present.   Musculoskeletal:      Lumbar back: No deformity or  tenderness. Decreased range of motion. Positive right straight leg raise test. Negative left straight leg raise test.      Right lower leg: No edema.      Left lower leg: No edema.   Lymphadenopathy:      Head:      Right side of head: No submental, submandibular, tonsillar, preauricular, posterior auricular or occipital adenopathy.      Left side of head: No submental, submandibular, tonsillar, preauricular, posterior auricular or occipital adenopathy.      Cervical: No cervical adenopathy.      Upper Body:      Right upper body: No supraclavicular adenopathy.      Left upper body: No supraclavicular adenopathy.   Skin:     General: Skin is warm.      Coloration: Skin is not cyanotic, jaundiced or pale.      Findings: No rash.      Nails: There is no clubbing.   Neurological:      Mental Status: She is alert and oriented to person, place, and time.      Cranial Nerves: No cranial nerve deficit.      Motor: No weakness (able to walk fully on both her heels and toes) or tremor.      Coordination: Coordination normal.      Gait: Gait normal.      Deep Tendon Reflexes:      Reflex Scores:       Patellar reflexes are 2+ on the right side and 2+ on the left side.       Achilles reflexes are 2+ on the right side and 2+ on the left side.  Psychiatric:         Attention and Perception: Attention normal.         Mood and Affect: Mood normal.         Speech: Speech normal.         Behavior: Behavior normal.         Thought Content: Thought content normal.       Assessment/Plan   Problems Addressed this Visit        Cardiac and Vasculature    Essential hypertension   Hypertension: at goal. Evidence of target organ damage: none.  Encouraged to continue to work on diet and exercise plan.   Continue current medication for now.  If cardiology has no objections we will consider replacing metoprolol with diltiazem or verapamil    Mixed hyperlipidemia  As above.   Continue current medication.    Nonsustained ventricular tachycardia  (CMS/[facility])   Follow up with cardiology     Precordial chest pain  Follow up with cardiology        Endocrine and Metabolic    Prediabetes  As above.  Will continue to monitor       Health Encounters    Healthcare maintenance  Recommended a Pneumovax 23  We will plan on a DEXA scan with her next mammogram    Relevant Orders    Pneumococcal Polysaccharide Vaccine 23-Valent Greater Than or Equal To 1yo Subcutaneous / IM (Completed)       Mental Health    Panic attacks  Stable.  Supportive therapy.   Continue current medication.       Musculoskeletal and Injuries    Mechanical low back pain  Advised regarding rest, gentle exercise, ice and heat.  Patient uninterested in undergoing a CT or MRI at present but will report if any worse, or if any new symptoms or concerns whatsoever       Sleep    KATALINA (obstructive sleep apnea)       Symptoms and Signs    Hot flashes         Diagnoses       Codes Comments    Nonsustained ventricular tachycardia (CMS/[facility])    -  Primary ICD-10-CM: I47.2  ICD-9-CM: 427.1     Mixed hyperlipidemia     ICD-10-CM: E78.2  ICD-9-CM: 272.2     Essential hypertension     ICD-10-CM: I10  ICD-9-CM: 401.9     Healthcare maintenance     ICD-10-CM: Z00.00  ICD-9-CM: V70.0     Panic attacks     ICD-10-CM: F41.0  ICD-9-CM: 300.01     KATALINA (obstructive sleep apnea)     ICD-10-CM: G47.33  ICD-9-CM: 327.23     Encounter for immunization     ICD-10-CM: Z23  ICD-9-CM: V03.89     Prediabetes     ICD-10-CM: R73.03  ICD-9-CM: 790.29     Precordial chest pain     ICD-10-CM: R07.2  ICD-9-CM: 786.51     Mechanical low back pain     ICD-10-CM: M54.5  ICD-9-CM: 724.2     Hot flashes     ICD-10-CM: R23.2  ICD-9-CM: 782.62

## 2021-06-08 ENCOUNTER — OFFICE VISIT (OUTPATIENT)
Dept: CARDIOLOGY | Facility: CLINIC | Age: 46
End: 2021-06-08

## 2021-06-08 VITALS
HEART RATE: 81 BPM | TEMPERATURE: 96.6 F | WEIGHT: 189.2 LBS | DIASTOLIC BLOOD PRESSURE: 89 MMHG | BODY MASS INDEX: 30.54 KG/M2 | SYSTOLIC BLOOD PRESSURE: 128 MMHG

## 2021-06-08 DIAGNOSIS — I10 ESSENTIAL HYPERTENSION: ICD-10-CM

## 2021-06-08 DIAGNOSIS — R00.0 WIDE-COMPLEX TACHYCARDIA: ICD-10-CM

## 2021-06-08 DIAGNOSIS — R07.2 PRECORDIAL CHEST PAIN: ICD-10-CM

## 2021-06-08 DIAGNOSIS — R73.03 PREDIABETES: ICD-10-CM

## 2021-06-08 DIAGNOSIS — I47.29 NONSUSTAINED VENTRICULAR TACHYCARDIA (HCC): Primary | ICD-10-CM

## 2021-06-08 DIAGNOSIS — E78.2 MIXED HYPERLIPIDEMIA: ICD-10-CM

## 2021-06-08 DIAGNOSIS — G47.33 OSA (OBSTRUCTIVE SLEEP APNEA): ICD-10-CM

## 2021-06-08 PROCEDURE — 99214 OFFICE O/P EST MOD 30 MIN: CPT | Performed by: SPECIALIST

## 2021-06-08 RX ORDER — ROSUVASTATIN CALCIUM 20 MG/1
20 TABLET, COATED ORAL DAILY
Qty: 90 TABLET | Refills: 3 | Status: SHIPPED | OUTPATIENT
Start: 2021-06-08 | End: 2021-09-14 | Stop reason: SDUPTHER

## 2021-06-08 RX ORDER — METOPROLOL TARTRATE 100 MG/1
100 TABLET ORAL 2 TIMES DAILY
Qty: 180 TABLET | Refills: 1 | Status: SHIPPED | OUTPATIENT
Start: 2021-06-08 | End: 2021-07-12

## 2021-06-08 NOTE — PROGRESS NOTES
Subjective   Follow up, nonsustained ventricular tachycardia  Iraida Heard is a 46 y.o. female who presents to day for Follow-up (ROUTINE) and Med Management (LIST).    CHIEF COMPLIANT  Chief Complaint   Patient presents with   • Follow-up     ROUTINE   • Med Management     LIST       Active Problems:  Problem List Items Addressed This Visit        Cardiac and Vasculature    Precordial chest pain    Essential hypertension    Relevant Medications    metoprolol tartrate (LOPRESSOR) 100 MG tablet    Nonsustained ventricular tachycardia (CMS/HCC) - Primary    Relevant Medications    metoprolol tartrate (LOPRESSOR) 100 MG tablet    Mixed hyperlipidemia    Relevant Medications    rosuvastatin (CRESTOR) 20 MG tablet    Other Relevant Orders    NMR LipoProfile    Comprehensive Metabolic Panel       Endocrine and Metabolic    Prediabetes       Sleep    KATALINA (obstructive sleep apnea)    Relevant Orders    Ambulatory Referral to Sleep Medicine      Other Visit Diagnoses     Wide-complex tachycardia (CMS/HCC)        Relevant Medications    metoprolol tartrate (LOPRESSOR) 100 MG tablet          HPI  HPI  He is doing some better x-rays chest pain is improved is still happening but not as often I am more brief she had only one episode episode of palpitation which was brief and she still taking the metoprolol which seems to be helping he feels tired however and she occasionally wakes up gasping for breath still edema no shortness of breath  PRIOR MEDS  Current Outpatient Medications on File Prior to Visit   Medication Sig Dispense Refill   • Calcium Polycarbophil (FIBER LAXATIVE PO) Take  by mouth.     • Cranberry 125 MG tablet Take  by mouth Daily.     • dicyclomine (BENTYL) 20 MG tablet Take  by mouth 2 (two) times a day.     • FLUoxetine (PROzac) 20 MG capsule Take 1 capsule by mouth Every Morning. 90 capsule 3   • omeprazole (priLOSEC) 20 MG capsule Take 1 capsule by mouth Daily. 90 capsule 3   • Probiotic Product  (Risaquad-2) capsule capsule Take 1 capsule by mouth Daily.     • [DISCONTINUED] ketorolac (TORADOL) 10 MG tablet Take 1 tablet by mouth Every 6 (Six) Hours As Needed for Moderate Pain . 12 tablet 0   • [DISCONTINUED] metoprolol tartrate (LOPRESSOR) 100 MG tablet Take 1 tablet by mouth 2 (Two) Times a Day. 180 tablet 1   • [DISCONTINUED] rosuvastatin (CRESTOR) 20 MG tablet Take 1 tablet by mouth Daily. 90 tablet 3     No current facility-administered medications on file prior to visit.       ALLERGIES  Patient has no known allergies.    HISTORY  Past Medical History:   Diagnosis Date   • Arthritis    • Hyperlipidemia    • Hypertension 2/9/2021       Social History     Socioeconomic History   • Marital status:      Spouse name: Not on file   • Number of children: Not on file   • Years of education: Not on file   • Highest education level: Not on file   Tobacco Use   • Smoking status: Never Smoker   • Smokeless tobacco: Never Used   Vaping Use   • Vaping Use: Never used   Substance and Sexual Activity   • Alcohol use: Never   • Drug use: Never   • Sexual activity: Defer       Family History   Problem Relation Age of Onset   • Breast cancer Maternal Grandmother         60's   • Cancer Mother         kidney   • Diabetes Mother    • Hypertension Father    • Heart disease Father        Review of Systems   Constitutional: Negative for activity change.   Respiratory: Positive for chest tightness. Negative for apnea, cough, choking, shortness of breath, wheezing and stridor.    Cardiovascular: Positive for palpitations. Negative for chest pain and leg swelling.       Objective     VITALS: /89   Pulse 81   Temp 96.6 °F (35.9 °C)   Wt 85.8 kg (189 lb 3.2 oz)   BMI 30.54 kg/m²     LABS:   Lab Results (most recent)     None          IMAGING:   XR Spine Lumbar 2 or 3 View    Result Date: 5/19/2021  Mild degenerative disc change at L4-5.  This report was finalized on 5/19/2021 2:42 PM by Dr. Bob Montero II, MD.       XR Chest 1 View    Result Date: 2/25/2021    Unremarkable exam. No acute cardiopulmonary findings identified.  This report was finalized on 2/25/2021 11:59 AM by Dr. Ivan Matthews MD.        EXAM:  Physical Exam  Vitals reviewed.   Constitutional:       Appearance: She is well-developed.   HENT:      Head: Normocephalic and atraumatic.   Eyes:      Pupils: Pupils are equal, round, and reactive to light.   Neck:      Thyroid: No thyromegaly.      Vascular: No JVD.   Cardiovascular:      Rate and Rhythm: Normal rate and regular rhythm.      Heart sounds: Normal heart sounds. No murmur heard.   No friction rub. No gallop.    Pulmonary:      Effort: Pulmonary effort is normal. No respiratory distress.      Breath sounds: Normal breath sounds. No stridor. No wheezing or rales.   Chest:      Chest wall: No tenderness.   Musculoskeletal:         General: No tenderness or deformity.      Cervical back: Neck supple.   Skin:     General: Skin is warm and dry.   Neurological:      Mental Status: She is alert and oriented to person, place, and time.      Cranial Nerves: No cranial nerve deficit.      Coordination: Coordination normal.         Procedure   Procedures       Assessment/Plan     Diagnoses and all orders for this visit:    1. Nonsustained ventricular tachycardia (CMS/HCC) (Primary)    2. Essential hypertension    3. Precordial chest pain    4. Mixed hyperlipidemia  -     rosuvastatin (CRESTOR) 20 MG tablet; Take 1 tablet by mouth Daily.  Dispense: 90 tablet; Refill: 3  -     NMR LipoProfile; Future  -     Comprehensive Metabolic Panel; Future    5. Prediabetes    6. KATALINA (obstructive sleep apnea)  -     Ambulatory Referral to Sleep Medicine    7. Wide-complex tachycardia (CMS/HCC)  -     metoprolol tartrate (LOPRESSOR) 100 MG tablet; Take 1 tablet by mouth 2 (Two) Times a Day.  Dispense: 180 tablet; Refill: 1    1.  She was seen by the electrophysiology who recommended repeating the sleep study at the lab she  however had not stop the metoprolol and this seems to be controlling her symptoms sufficiently so we will continue the metoprolol for now  2.  As mentioned above her previous sleep study at home was negative but she is very symptomatic still we will try to do a in lab sleep study  3.  Her chest pain is much better her stress test was negative we will continue current management  4.  I reviewed the lab with her NMR is much better now still slightly suboptimal with slightly elevated LDL P particle and small LDL P she is prediabetic she was discussed with her primary care physician for the management weight loss was discussed  5.  We will repeat her labs in 6 months time her liver function slightly evaded since started on the rosuvastatin and will monitor the    Return in about 6 months (around 12/8/2021).               MEDS ORDERED DURING VISIT:  New Medications Ordered This Visit   Medications   • metoprolol tartrate (LOPRESSOR) 100 MG tablet     Sig: Take 1 tablet by mouth 2 (Two) Times a Day.     Dispense:  180 tablet     Refill:  1   • rosuvastatin (CRESTOR) 20 MG tablet     Sig: Take 1 tablet by mouth Daily.     Dispense:  90 tablet     Refill:  3       As always, Amari Mixon MD  I appreciate very much the opportunity to participate in the cardiovascular care of your patients. Please do not hesitate to call me with any questions with regards to Iraida Heard evaluation and management.         This document has been electronically signed by Shilpa Mcgraw MD  June 8, 2021 10:21 EDT

## 2021-07-12 ENCOUNTER — OFFICE VISIT (OUTPATIENT)
Dept: SLEEP MEDICINE | Facility: CLINIC | Age: 46
End: 2021-07-12

## 2021-07-12 ENCOUNTER — OFFICE VISIT (OUTPATIENT)
Dept: FAMILY MEDICINE CLINIC | Facility: CLINIC | Age: 46
End: 2021-07-12

## 2021-07-12 VITALS
HEIGHT: 66 IN | OXYGEN SATURATION: 96 % | BODY MASS INDEX: 30.5 KG/M2 | SYSTOLIC BLOOD PRESSURE: 98 MMHG | WEIGHT: 189.8 LBS | HEART RATE: 62 BPM | DIASTOLIC BLOOD PRESSURE: 64 MMHG

## 2021-07-12 VITALS
SYSTOLIC BLOOD PRESSURE: 118 MMHG | TEMPERATURE: 97.1 F | OXYGEN SATURATION: 95 % | HEART RATE: 74 BPM | HEIGHT: 66 IN | DIASTOLIC BLOOD PRESSURE: 60 MMHG | BODY MASS INDEX: 30.53 KG/M2 | WEIGHT: 190 LBS | RESPIRATION RATE: 14 BRPM

## 2021-07-12 DIAGNOSIS — R06.83 SNORING: Primary | ICD-10-CM

## 2021-07-12 DIAGNOSIS — R07.2 PRECORDIAL CHEST PAIN: Primary | ICD-10-CM

## 2021-07-12 DIAGNOSIS — I47.29 NONSUSTAINED VENTRICULAR TACHYCARDIA (HCC): ICD-10-CM

## 2021-07-12 DIAGNOSIS — E78.2 MIXED HYPERLIPIDEMIA: ICD-10-CM

## 2021-07-12 DIAGNOSIS — M54.59 MECHANICAL LOW BACK PAIN: ICD-10-CM

## 2021-07-12 DIAGNOSIS — E66.09 CLASS 1 OBESITY DUE TO EXCESS CALORIES WITHOUT SERIOUS COMORBIDITY WITH BODY MASS INDEX (BMI) OF 30.0 TO 30.9 IN ADULT: ICD-10-CM

## 2021-07-12 DIAGNOSIS — Z00.00 HEALTHCARE MAINTENANCE: ICD-10-CM

## 2021-07-12 DIAGNOSIS — I10 ESSENTIAL HYPERTENSION: ICD-10-CM

## 2021-07-12 DIAGNOSIS — R73.03 PREDIABETES: ICD-10-CM

## 2021-07-12 DIAGNOSIS — J30.9 CHRONIC ALLERGIC RHINITIS: ICD-10-CM

## 2021-07-12 DIAGNOSIS — F41.0 PANIC ATTACKS: ICD-10-CM

## 2021-07-12 PROCEDURE — 99214 OFFICE O/P EST MOD 30 MIN: CPT | Performed by: GENERAL PRACTICE

## 2021-07-12 PROCEDURE — 99203 OFFICE O/P NEW LOW 30 MIN: CPT | Performed by: INTERNAL MEDICINE

## 2021-07-12 RX ORDER — CETIRIZINE HYDROCHLORIDE 10 MG/1
10 TABLET ORAL DAILY PRN
Qty: 30 TABLET | Refills: 5 | Status: SHIPPED | OUTPATIENT
Start: 2021-07-12 | End: 2022-05-25

## 2021-07-12 RX ORDER — FLUTICASONE PROPIONATE 50 MCG
2 SPRAY, SUSPENSION (ML) NASAL DAILY
Qty: 16 G | Refills: 5 | Status: SHIPPED | OUTPATIENT
Start: 2021-07-12 | End: 2021-11-18

## 2021-07-15 NOTE — PROGRESS NOTES
Subjective   Iraida Heard is a 46 y.o. female is being seen for consultation today at the request of Shilpa Mcgraw MD for the evaluation of snoring and possible sleep disordered breathing.  Her primary care physician is Dr. Bernstein.  She is seen in person in the sleep clinic.    History of Present Illness  Patient is referred to cardiology for evaluation of possible sleep disordered breathing.  She has been having some episodes of elevated heart rate for about the past year.  She had one episode that occurred at night.  She is said her last episode was about 2 months ago.  She says she is always been noted to have snoring.  She has a lot of sinus congestion.    She had home sleep testing she has had last year and then in February had an in lab study in [place].  She was not diagnosed with sleep apnea.  She says her weight is about the same.  She still has snoring.  She denies any noted apneas.  She says she usually feels rested in the morning.  She denies morning headaches.  She denies kicking or jerking her legs at night.  She denies having chronic pain that keeps her awake.    She goes to bed about 11 PM.  She will fall asleep quickly.  She awakens once during the night.  She thinks she gets about 8-1/2 hours of sleep and usually feels rested.  She denies any history of hypertension or diabetes.  She does have occasional tachycardia.      She has seasonal environmental allergies      Current Outpatient Medications:   •  Calcium Polycarbophil (FIBER LAXATIVE PO), Take  by mouth., Disp: , Rfl:   •  Cranberry 125 MG tablet, Take  by mouth Daily., Disp: , Rfl:   •  dicyclomine (BENTYL) 20 MG tablet, Take  by mouth 2 (two) times a day., Disp: , Rfl:   •  FLUoxetine (PROzac) 20 MG capsule, Take 1 capsule by mouth Every Morning., Disp: 90 capsule, Rfl: 3  •  omeprazole (priLOSEC) 20 MG capsule, Take 1 capsule by mouth Daily., Disp: 90 capsule, Rfl: 3  •  Probiotic Product (Risaquad-2) capsule capsule, Take 1 capsule  by mouth Daily., Disp: , Rfl:   •  rosuvastatin (CRESTOR) 20 MG tablet, Take 1 tablet by mouth Daily., Disp: 90 tablet, Rfl: 3  •  cetirizine (zyrTEC) 10 MG tablet, Take 1 tablet by mouth Daily As Needed for Allergies., Disp: 30 tablet, Rfl: 5  •  fluticasone (Flonase) 50 MCG/ACT nasal spray, 2 sprays into the nostril(s) as directed by provider Daily. Administer 2 sprays both nostrils once daily, Disp: 16 g, Rfl: 5  •  metoprolol tartrate (LOPRESSOR) 25 MG tablet, 1 po bid for one week, the 1/2 po bid for one week, Disp: 21 tablet, Rfl: 0    Social History    Tobacco Use      Smoking status: Never Smoker      Smokeless tobacco: Never Used       Social History     Substance and Sexual Activity   Alcohol Use Never       Caffeine: Without    Past Medical History:   Diagnosis Date   • Arthritis    • Hyperlipidemia    • Hypertension 2/9/2021       Past Surgical History:   Procedure Laterality Date   • APPENDECTOMY     • CHOLECYSTECTOMY     • EXCISION BENIGN SKIN LESION SCALP / NECK / HANDS / FEET / GENITALIA     • GALLBLADDER SURGERY     • HYSTERECTOMY      GELY/RSO for benign disease   • SINUS SURGERY     • TONSILLECTOMY         Family History   Problem Relation Age of Onset   • Breast cancer Maternal Grandmother         60's   • Cancer Mother         kidney   • Diabetes Mother    • Hypertension Father    • Heart disease Father    • Stroke Other        The following portions of the patient's history were reviewed and updated as appropriate: allergies, current medications, past family history, past medical history, past social history, past surgical history and problem list.    Review of Systems   Constitutional: Negative.    HENT: Positive for sinus pain.    Eyes: Negative.    Respiratory: Negative.    Cardiovascular: Negative.    Gastrointestinal: Negative.    Endocrine: Negative.    Genitourinary: Negative.    Musculoskeletal: Positive for back pain.   Skin: Negative.    Allergic/Immunologic: Negative.    Neurological:  "Positive for headaches.   Hematological: Negative.    Psychiatric/Behavioral: Negative.    Malone score is 4/24    Objective     BP 98/64 (BP Location: Left arm, Patient Position: Sitting, Cuff Size: Adult)   Pulse 62   Ht 167.6 cm (66\")   Wt 86.1 kg (189 lb 12.8 oz)   SpO2 96%   BMI 30.63 kg/m²      Physical Exam  Patient appears to be awake and alert.  She does not appear to be in acute respiratory distress.    She is normocephalic.  She has Mallampati class IV anatomy.    Lungs are clear.  Cardiac exam revealed normal S1-S2    Extremities showed no edema.    Patient had a sleep study in [place] February 6, 2021.  She had good sleep efficiency at 91%.  Her AHI was 0.  Her oxygen saturation was normal at 93%.  Her weight was 185 pounds.    Assessment/Plan   Diagnoses and all orders for this visit:    1. Snoring (Primary)    2. Class 1 obesity due to excess calories without serious comorbidity with body mass index (BMI) of 30.0 to 30.9 in adult    Patient has a history of snoring but does not appear to have significant sleep disordered breathing.  We discussed measures to try to improve her snoring.  She is encouraged to achieve ideal body weight.  She is encouraged to avoid alcohol or sedatives close to bedtime.  She is encouraged to practice lateral position sleep.  She is also to consider getting an oral appliance for snoring.    She is to contact us if symptoms worsen but otherwise we will see her back in 6 months and reassess snoring at that time.    Total time: 35 minutes exclusive of procedures.         Dayton Henriquez MD [facility]  Sleep Medicine  Pulmonary and Critical Care Medicine    "

## 2021-07-21 ENCOUNTER — HOSPITAL ENCOUNTER (OUTPATIENT)
Dept: CT IMAGING | Facility: HOSPITAL | Age: 46
Discharge: HOME | End: 2021-07-21
Admitting: GENERAL PRACTICE

## 2021-07-21 DIAGNOSIS — M54.59 MECHANICAL LOW BACK PAIN: ICD-10-CM

## 2021-07-21 PROCEDURE — 72131 CT LUMBAR SPINE W/O DYE: CPT | Performed by: RADIOLOGY

## 2021-07-21 PROCEDURE — 72131 CT LUMBAR SPINE W/O DYE: CPT

## 2021-07-27 NOTE — PROGRESS NOTES
Sent [employer] message.     -- Please let patient know that her CT shows degenerative disc disease, osteoarthritis, and mild to moderate narrowing of the spinal canal I think it is unlikely that surgery would provide much benefit at present and would recommend a pain management assessment Let me know what she would like to do

## 2021-07-28 DIAGNOSIS — M54.59 MECHANICAL LOW BACK PAIN: Primary | ICD-10-CM

## 2021-08-12 ENCOUNTER — OFFICE VISIT (OUTPATIENT)
Dept: FAMILY MEDICINE CLINIC | Facility: CLINIC | Age: 46
End: 2021-08-12

## 2021-08-12 VITALS
WEIGHT: 191 LBS | RESPIRATION RATE: 14 BRPM | HEART RATE: 91 BPM | TEMPERATURE: 97.1 F | HEIGHT: 66 IN | OXYGEN SATURATION: 98 % | BODY MASS INDEX: 30.7 KG/M2 | DIASTOLIC BLOOD PRESSURE: 70 MMHG | SYSTOLIC BLOOD PRESSURE: 124 MMHG

## 2021-08-12 DIAGNOSIS — R07.2 PRECORDIAL CHEST PAIN: ICD-10-CM

## 2021-08-12 DIAGNOSIS — E78.2 MIXED HYPERLIPIDEMIA: ICD-10-CM

## 2021-08-12 DIAGNOSIS — I47.29 NONSUSTAINED VENTRICULAR TACHYCARDIA (HCC): ICD-10-CM

## 2021-08-12 DIAGNOSIS — J30.9 CHRONIC ALLERGIC RHINITIS: Primary | ICD-10-CM

## 2021-08-12 DIAGNOSIS — I10 ESSENTIAL HYPERTENSION: ICD-10-CM

## 2021-08-12 DIAGNOSIS — M54.59 MECHANICAL LOW BACK PAIN: ICD-10-CM

## 2021-08-12 DIAGNOSIS — G47.33 OSA (OBSTRUCTIVE SLEEP APNEA): ICD-10-CM

## 2021-08-12 DIAGNOSIS — R73.03 PREDIABETES: ICD-10-CM

## 2021-08-12 DIAGNOSIS — F41.0 PANIC ATTACKS: ICD-10-CM

## 2021-08-12 DIAGNOSIS — Z00.00 HEALTHCARE MAINTENANCE: ICD-10-CM

## 2021-08-12 PROCEDURE — 99214 OFFICE O/P EST MOD 30 MIN: CPT | Performed by: GENERAL PRACTICE

## 2021-08-12 RX ORDER — MONTELUKAST SODIUM 10 MG/1
10 TABLET ORAL DAILY
Qty: 90 TABLET | Refills: 3 | Status: SHIPPED | OUTPATIENT
Start: 2021-08-12 | End: 2022-08-24

## 2021-08-12 RX ORDER — CEFDINIR 300 MG/1
300 CAPSULE ORAL 2 TIMES DAILY
Qty: 20 CAPSULE | Refills: 0 | Status: SHIPPED | OUTPATIENT
Start: 2021-08-12 | End: 2021-08-22

## 2021-08-12 NOTE — PROGRESS NOTES
Subjective   Iraida Heard is a 46 y.o. female.     Chief Complaint  She returns for a scheduled reassessment of multiple medical problems including chronic allergic rhinitis, hyperlipidemia, and chronic mechanical lumbar back pain    History of Present Illness     Chronic Allergic Rhinitis  She complains of persistent nasal congestion, postnasal drip, periorbital pressure, and bilateral ear fullness.  She continues to deny any other upper respiratory tract symptoms and there is no history of any fever or chills. Her symptoms are year-round but generally worse in the summer.  She underwent a recent dental assessment and while records are unavailable states that x-rays were consistent with a sinus infection.  She remains on nasal fluticasone and oral cetirizine with no apparent side effects.  She gives a history of previous immunotherapy     Chest Pain  She denies any recent chest pain.  The pain has been described as a sharp left upper ache.  This has not not radiated elsewhere.  While she has had intermittent palpitations these have not occurred recently and she continues to deny any shortness of breath, lightheadedness, calf pain, or swelling.  There is no history of any cough, hemoptysis, fever, or chills.  Nuclear stress testing and an echocardiogram performed on 3/4/2021 were unremarkable.  Cardiac event monitoring performed between 2/17/2021 and 3/2/2021 revealed a 12 beat episode of ventricular tachycardia.  She discontinue metoprolol since last here    Hyperlipidemia  She has been following an appropriate diet but admits to getting less exercise over the last few years with a 10 to 15 pound weight gain.  She remains on rosuvastatin with no apparent side effects.    Panic Attacks  She admits to intermittent episodes of nervousness, worrying, flushing, and diaphoresis.  She denies persistent anxiety and there is no history of depression, loss of interest in activities, or suicide ideation.  She remains on  fluoxetine    Low Back Pain  She gives a 4 to 5 year history of intermittent low back pain.  This has been worse over the last 6 months.  There is no history of any recent strain or trauma nor any change in her activities.  The pain is described as a bilateral lower ache radiating to her right posterior thigh and calf.  The pain in her back has been worse than that in her leg.  She continues to deny any weakness, numbness, or tingling and there is been no change in her bowel/bladder control.  The pain increases with activity improves with rest.  Plain films of the lumbar spine performed on 5/19/2021 were reported as showing mild degenerative disc disease at L4-5.  Noncontrast CT of the lumbar spine performed on 7/21/2021 was reported as showing multilevel DDD and osteoarthritis with mild to moderate central canal stenosis at L4-5.  She has seen both a physical therapist and a chiropractor in the past with little improvement in her symptoms.  She is scheduled to undergo a pain management assessment in 4 days.    The following portions of the patient's history were reviewed and updated as appropriate: allergies, current medications, past medical history, past social history and problem list.    Review of Systems   Constitutional: Positive for fatigue. Negative for appetite change, chills, fever and unexpected weight change.   HENT: Positive for congestion, ear pain (bilateral ear fullness), postnasal drip, rhinorrhea and sinus pressure. Negative for sneezing and sore throat.    Eyes: Negative for visual disturbance.   Respiratory: Negative for cough, shortness of breath and wheezing.    Cardiovascular: Negative for chest pain, palpitations and leg swelling.   Gastrointestinal: Negative for abdominal pain, blood in stool, constipation, diarrhea, nausea and vomiting.   Genitourinary: Negative for difficulty urinating, dysuria, frequency, hematuria and urgency.   Musculoskeletal: Positive for back pain. Negative for  arthralgias, joint swelling, myalgias and neck pain.   Skin: Negative for rash.   Neurological: Negative for weakness, numbness and headaches.   Psychiatric/Behavioral: Positive for sleep disturbance. Negative for dysphoric mood and suicidal ideas. The patient is nervous/anxious.      Objective   Physical Exam  Constitutional:       General: She is not in acute distress.     Appearance: Normal appearance. She is well-developed. She is not diaphoretic.      Comments: Accompanied by her mother.  Bright and in good spirits. No apparent distress. No pallor, jaundice, diaphoresis, or cyanosis.   HENT:      Head: Atraumatic.      Right Ear: Tympanic membrane, ear canal and external ear normal.      Left Ear: Tympanic membrane, ear canal and external ear normal.      Mouth/Throat:      Mouth: Mucous membranes are moist.      Pharynx: No posterior oropharyngeal erythema.   Eyes:      Conjunctiva/sclera: Conjunctivae normal.   Neck:      Thyroid: No thyroid mass or thyromegaly.      Vascular: No carotid bruit or JVD.      Trachea: Trachea normal. No tracheal deviation.   Cardiovascular:      Rate and Rhythm: Normal rate and regular rhythm.      Heart sounds: Normal heart sounds, S1 normal and S2 normal. No murmur heard.   No gallop.    Pulmonary:      Effort: Pulmonary effort is normal.      Breath sounds: Normal breath sounds.   Abdominal:      General: Bowel sounds are normal. There is no distension.   Musculoskeletal:      Lumbar back: Negative right straight leg raise test and negative left straight leg raise test.      Right lower leg: No edema.      Left lower leg: No edema.   Lymphadenopathy:      Head:      Right side of head: No submental, submandibular, tonsillar, preauricular, posterior auricular or occipital adenopathy.      Left side of head: No submental, submandibular, tonsillar, preauricular, posterior auricular or occipital adenopathy.      Cervical: No cervical adenopathy.      Upper Body:      Right upper  body: No supraclavicular adenopathy.      Left upper body: No supraclavicular adenopathy.   Skin:     General: Skin is warm.      Coloration: Skin is not cyanotic, jaundiced or pale.      Findings: No rash.      Nails: There is no clubbing.   Neurological:      Mental Status: She is alert and oriented to person, place, and time.      Cranial Nerves: No cranial nerve deficit.      Motor: No weakness or tremor.      Coordination: Coordination normal.      Gait: Gait normal.   Psychiatric:         Attention and Perception: Attention normal.         Mood and Affect: Mood normal.         Speech: Speech normal.         Behavior: Behavior normal.         Thought Content: Thought content normal.       Assessment/Plan   Problems Addressed this Visit        Allergies and Adverse Reactions    Chronic allergic rhinitis   Reminded regarding allergen avoidance.  Short course of cefdinir prescribed  We'll also add montelukast  Encouraged to report if any worse, any new symptoms, or if no better over the next several weeks as a referral to allergy medicine will then be made    Relevant Medications    montelukast (SINGULAIR) 10 MG tablet    cefdinir (OMNICEF) 300 MG capsule       Cardiac and Vasculature    Essential hypertension   Hypertension: at goal off medication. Evidence of target organ damage: none.  Encouraged to continue to work on diet and exercise plan.   Will continue to monitor    Mixed hyperlipidemia  As above.   Continue current medication.    Nonsustained ventricular tachycardia (CMS/HCC)    Precordial chest pain  Resolved  Encouraged to report if this should change.       Endocrine and Metabolic    Prediabetes  As above.  Will continue to monitor       Health Encounters    Healthcare maintenance  Recommended a flu shot  We'll discuss an updated mammogram at her return       Mental Health    Panic attacks  Stable.  Supportive therapy.   Continue current medication.       Musculoskeletal and Injuries    Mechanical low  back pain  Associated with DDD and OA  Reminded regarding symptomatic treatment.   Follow up with pain management  Encouraged to report if any worse or if any new symptoms or concerns.       Sleep    KATALINA (obstructive sleep apnea)      Diagnoses       Codes Comments    Chronic allergic rhinitis    -  Primary ICD-10-CM: J30.9  ICD-9-CM: 477.9     Precordial chest pain     ICD-10-CM: R07.2  ICD-9-CM: 786.51     Nonsustained ventricular tachycardia (CMS/HCC)     ICD-10-CM: I47.2  ICD-9-CM: 427.1     Mixed hyperlipidemia     ICD-10-CM: E78.2  ICD-9-CM: 272.2     Essential hypertension     ICD-10-CM: I10  ICD-9-CM: 401.9     Prediabetes     ICD-10-CM: R73.03  ICD-9-CM: 790.29     Healthcare maintenance     ICD-10-CM: Z00.00  ICD-9-CM: V70.0     Panic attacks     ICD-10-CM: F41.0  ICD-9-CM: 300.01     Mechanical low back pain     ICD-10-CM: M54.5  ICD-9-CM: 724.2     KATALINA (obstructive sleep apnea)     ICD-10-CM: G47.33  ICD-9-CM: 327.23

## 2021-09-14 ENCOUNTER — TELEPHONE (OUTPATIENT)
Dept: CARDIOLOGY | Facility: CLINIC | Age: 46
End: 2021-09-14

## 2021-09-14 DIAGNOSIS — E78.2 MIXED HYPERLIPIDEMIA: ICD-10-CM

## 2021-09-14 RX ORDER — ROSUVASTATIN CALCIUM 20 MG/1
20 TABLET, COATED ORAL DAILY
Qty: 90 TABLET | Refills: 3 | Status: SHIPPED | OUTPATIENT
Start: 2021-09-14 | End: 2021-09-16 | Stop reason: SDUPTHER

## 2021-09-16 DIAGNOSIS — E78.2 MIXED HYPERLIPIDEMIA: ICD-10-CM

## 2021-09-16 RX ORDER — ROSUVASTATIN CALCIUM 20 MG/1
20 TABLET, COATED ORAL DAILY
Qty: 90 TABLET | Refills: 1 | Status: SHIPPED | OUTPATIENT
Start: 2021-09-16 | End: 2022-03-13 | Stop reason: SDUPTHER

## 2021-10-15 ENCOUNTER — FLU SHOT (OUTPATIENT)
Dept: FAMILY MEDICINE CLINIC | Facility: CLINIC | Age: 46
End: 2021-10-15

## 2021-10-15 DIAGNOSIS — Z23 NEED FOR INFLUENZA VACCINATION: Primary | ICD-10-CM

## 2021-10-15 PROCEDURE — G0008 ADMIN INFLUENZA VIRUS VAC: HCPCS | Performed by: GENERAL PRACTICE

## 2021-10-15 PROCEDURE — 90686 IIV4 VACC NO PRSV 0.5 ML IM: CPT | Performed by: GENERAL PRACTICE

## 2021-10-26 ENCOUNTER — OFFICE VISIT (OUTPATIENT)
Dept: FAMILY MEDICINE CLINIC | Facility: CLINIC | Age: 46
End: 2021-10-26

## 2021-10-26 ENCOUNTER — TELEPHONE (OUTPATIENT)
Dept: FAMILY MEDICINE CLINIC | Facility: CLINIC | Age: 46
End: 2021-10-26

## 2021-10-26 VITALS
HEIGHT: 66 IN | WEIGHT: 193 LBS | HEART RATE: 68 BPM | DIASTOLIC BLOOD PRESSURE: 72 MMHG | TEMPERATURE: 96.8 F | BODY MASS INDEX: 31.02 KG/M2 | OXYGEN SATURATION: 99 % | SYSTOLIC BLOOD PRESSURE: 118 MMHG

## 2021-10-26 DIAGNOSIS — H65.191 OTHER NON-RECURRENT ACUTE NONSUPPURATIVE OTITIS MEDIA OF RIGHT EAR: ICD-10-CM

## 2021-10-26 DIAGNOSIS — J32.1 FRONTAL SINUSITIS, UNSPECIFIED CHRONICITY: Primary | ICD-10-CM

## 2021-10-26 PROCEDURE — 96372 THER/PROPH/DIAG INJ SC/IM: CPT | Performed by: NURSE PRACTITIONER

## 2021-10-26 PROCEDURE — 99213 OFFICE O/P EST LOW 20 MIN: CPT | Performed by: NURSE PRACTITIONER

## 2021-10-26 RX ORDER — CEFDINIR 300 MG/1
300 CAPSULE ORAL 2 TIMES DAILY
Qty: 20 CAPSULE | Refills: 0 | Status: SHIPPED | OUTPATIENT
Start: 2021-10-26 | End: 2021-11-05

## 2021-10-26 RX ORDER — CEFTRIAXONE 1 G/1
1 INJECTION, POWDER, FOR SOLUTION INTRAMUSCULAR; INTRAVENOUS EVERY 24 HOURS
Status: COMPLETED | OUTPATIENT
Start: 2021-10-26 | End: 2021-10-26

## 2021-10-26 RX ADMIN — CEFTRIAXONE 1 G: 1 INJECTION, POWDER, FOR SOLUTION INTRAMUSCULAR; INTRAVENOUS at 13:27

## 2021-10-26 NOTE — PROGRESS NOTES
Chief Complaint  Facial Pain    Subjective          Iraida Heard is a 46 y.o. female who presents today to [facility] for initial evaluation for sinus pain.    HPI:   Sinus pain- patient states that she is having pain in her forehead, bilateral jaws, with lymph node tenderness. Patient states that the symptoms started about 2 weeks ago. She states that her right ear has been hurting as well. She has tried several over the counter medications and remedies with no relief. She has tried flonase, mucinex, and a netti pot. Patient states that she seen her dentist and dental x rays were performed which also confirmed there were some sinus infection. Patient denies fever, chills, sore throat.         Objective     Problem List:  Patient Active Problem List   Diagnosis   • Precordial chest pain   • Essential hypertension   • Nonsustained ventricular tachycardia (HCC)   • Mixed hyperlipidemia   • KATALINA (obstructive sleep apnea)   • Panic attacks   • Hot flashes   • Healthcare maintenance   • Prediabetes   • Mechanical low back pain   • Chronic allergic rhinitis       Allergy:   No Known Allergies     Discontinued Medications:  There are no discontinued medications.    Current Medications:   Current Outpatient Medications   Medication Sig Dispense Refill   • Calcium Polycarbophil (FIBER LAXATIVE PO) Take  by mouth.     • cetirizine (zyrTEC) 10 MG tablet Take 1 tablet by mouth Daily As Needed for Allergies. 30 tablet 5   • Cranberry 125 MG tablet Take  by mouth Daily.     • dicyclomine (BENTYL) 20 MG tablet Take  by mouth 2 (two) times a day.     • FLUoxetine (PROzac) 20 MG capsule Take 1 capsule by mouth Every Morning. 90 capsule 3   • fluticasone (Flonase) 50 MCG/ACT nasal spray 2 sprays into the nostril(s) as directed by provider Daily. Administer 2 sprays both nostrils once daily 16 g 5   • montelukast (SINGULAIR) 10 MG tablet Take 1 tablet by mouth Daily. 90 tablet 3   • omeprazole  (priLOSEC) 20 MG capsule Take 1 capsule by mouth Daily. 90 capsule 3   • Probiotic Product (Risaquad-2) capsule capsule Take 1 capsule by mouth Daily.     • rosuvastatin (CRESTOR) 20 MG tablet Take 1 tablet by mouth Daily. 90 tablet 1   • cefdinir (OMNICEF) 300 MG capsule Take 1 capsule by mouth 2 (Two) Times a Day for 10 days. 20 capsule 0     Current Facility-Administered Medications   Medication Dose Route Frequency Provider Last Rate Last Admin   • cefTRIAXone (ROCEPHIN) injection 1 g  1 g Intramuscular Q24H Madeleine Patel APRN           Past Medical History:  Past Medical History:   Diagnosis Date   • Arthritis    • Hyperlipidemia    • Hypertension 2/9/2021       Past Surgical History:  Past Surgical History:   Procedure Laterality Date   • APPENDECTOMY     • CHOLECYSTECTOMY     • EXCISION BENIGN SKIN LESION SCALP / NECK / HANDS / FEET / GENITALIA     • GALLBLADDER SURGERY     • HYSTERECTOMY      GELY/RSO for benign disease   • SINUS SURGERY     • TONSILLECTOMY         Review of Systems:  Review of Systems   Constitutional: Negative.    HENT: Positive for ear pain, sinus pressure and sinus pain.    Eyes: Negative.    Respiratory: Negative.    Cardiovascular: Negative.    Gastrointestinal: Negative.    Genitourinary: Negative.    Musculoskeletal: Negative.    Skin: Negative.    Neurological: Negative.    Psychiatric/Behavioral: Negative.    All other systems reviewed and are negative.      Physical Exam:  Physical Exam  Vitals and nursing note reviewed.   Constitutional:       General: She is not in acute distress.     Appearance: Normal appearance. She is not ill-appearing.      Interventions: Face mask in place.   HENT:      Head: Normocephalic and atraumatic.      Jaw: Tenderness present. No pain on movement.      Right Ear: Tympanic membrane is erythematous.      Nose: Nose normal.      Mouth/Throat:      Mouth: Mucous membranes are moist.      Pharynx: Oropharynx is clear.   Eyes:      Pupils: Pupils are  "equal, round, and reactive to light.   Cardiovascular:      Rate and Rhythm: Normal rate and regular rhythm.      Pulses: Normal pulses.   Pulmonary:      Effort: Pulmonary effort is normal. No respiratory distress.      Breath sounds: Normal breath sounds.   Abdominal:      Palpations: Abdomen is soft.   Musculoskeletal:         General: Normal range of motion.      Cervical back: Normal range of motion and neck supple.   Skin:     General: Skin is warm and dry.      Capillary Refill: Capillary refill takes less than 2 seconds.   Neurological:      Mental Status: She is alert and oriented to person, place, and time.   Psychiatric:         Mood and Affect: Mood normal.         Behavior: Behavior normal.         Vital Signs:   /72   Pulse 68   Temp 96.8 °F (36 °C) (Temporal)   Ht 167.6 cm (66\")   Wt 87.5 kg (193 lb)   SpO2 99%   BMI 31.15 kg/m²      Lab Results:   Lab on 05/03/2021   Component Date Value Ref Range Status   • LH 05/03/2021 9.8  mIU/mL Final    Comment:                     Adult Female:                        Follicular phase      2.4 -  12.6                        Ovulation phase      14.0 -  95.6                        Luteal phase          1.0 -  11.4                        Postmenopausal        7.7 -  58.5     • FSH 05/03/2021 7.6  mIU/mL Final    Comment:                     Adult Female:                        Follicular phase      3.5 -  12.5                        Ovulation phase       4.7 -  21.5                        Luteal phase          1.7 -   7.7                        Postmenopausal       25.8 - 134.8     • C-Reactive Protein 05/03/2021 0.32  0.00 - 0.50 mg/dL Final   • Sed Rate 05/03/2021 5  0 - 20 mm/hr Final   • Hep C Virus Ab 05/03/2021 0.6  0.0 - 0.9 s/co ratio Final    Comment:                                   Negative:     < 0.8                               Indeterminate: 0.8 - 0.9                                    Positive:     > 0.9   The CDC recommends that a " positive HCV antibody result   be followed up with a HCV Nucleic Acid Amplification   test (760119).     • Vitamin B-12 05/03/2021 1,604* 211 - 946 pg/mL Final    Results may be falsely increased if patient taking Biotin.   • 25 Hydroxy, Vitamin D 05/03/2021 37.4  30.0 - 100.0 ng/ml Final    Comment: Results may be falsely increased if patient taking Biotin.  Reference Range for Total Vitamin D 25(OH)  Deficiency <20.0 ng/mL  Insufficiency 21-29 ng/mL  Sufficiency  ng/mL  Toxicity >100 ng/ml     • T3, Free 05/03/2021 3.1  2.0 - 4.4 pg/mL Final   • Free T4 05/03/2021 0.83* 0.93 - 1.70 ng/dL Final    Results may be falsely increased if patient taking Biotin.   • TSH 05/03/2021 1.940  0.270 - 4.200 uIU/mL Final   • Total Cholesterol 05/03/2021 148  0 - 200 mg/dL Final    Comment: Cholesterol Reference Ranges  (U.S. Department of Health and Human Services ATP III  Classifications)  Desirable          <200 mg/dL  Borderline High    200-239 mg/dL  High Risk          >240 mg/dL  Triglyceride Reference Ranges  (U.S. Department of Health and Human Services ATP III  Classifications)  Normal           <150 mg/dL  Borderline High  150-199 mg/dL  High             200-499 mg/dL  Very High        >500 mg/dL  HDL Reference Ranges  (U.S. Department of Health and Human Services ATP III  Classifcations)  Low     <40 mg/dl (major risk factor for CHD)  High    >60 mg/dl ('negative' risk factor for CHD)  LDL Reference Ranges  (U.S. Department of Health and Human Services ATP III  Classifcations)  Optimal          <100 mg/dL  Near Optimal     100-129 mg/dL  Borderline High  130-159 mg/dL  High             160-189 mg/dL  Very High        >189 mg/dL     • Triglycerides 05/03/2021 143  0 - 150 mg/dL Final   • HDL Cholesterol 05/03/2021 43  40 - 60 mg/dL Final   • VLDL Cholesterol Florin 05/03/2021 25  5 - 40 mg/dL Final   • LDL Chol Calc ([facility]) 05/03/2021 80  0 - 100 mg/dL Final   • Glucose 05/03/2021 124* 65 - 99 mg/dL Final   • BUN  05/03/2021 15  6 - 20 mg/dL Final   • Creatinine 05/03/2021 0.81  0.57 - 1.00 mg/dL Final   • eGFR Non  Am 05/03/2021 76  >60 mL/min/1.73 Final    Comment: GFR Normal >60  Chronic Kidney Disease <60  Kidney Failure <15     • eGFR  Am 05/03/2021 93  >60 mL/min/1.73 Final   • BUN/Creatinine Ratio 05/03/2021 18.5  7.0 - 25.0 Final   • Sodium 05/03/2021 140  136 - 145 mmol/L Final   • Potassium 05/03/2021 4.3  3.5 - 5.2 mmol/L Final   • Chloride 05/03/2021 104  98 - 107 mmol/L Final   • Total CO2 05/03/2021 24.5  22.0 - 29.0 mmol/L Final   • Calcium 05/03/2021 9.3  8.6 - 10.5 mg/dL Final   • Total Protein 05/03/2021 6.8  6.0 - 8.5 g/dL Final   • Albumin 05/03/2021 4.30  3.50 - 5.20 g/dL Final   • Globulin 05/03/2021 2.5  gm/dL Final   • A/G Ratio 05/03/2021 1.7  g/dL Final   • Total Bilirubin 05/03/2021 0.4  0.0 - 1.2 mg/dL Final   • Alkaline Phosphatase 05/03/2021 56  39 - 117 U/L Final   • AST (SGOT) 05/03/2021 46* 1 - 32 U/L Final   • ALT (SGPT) 05/03/2021 33  1 - 33 U/L Final   • WBC 05/03/2021 5.60  3.40 - 10.80 10*3/mm3 Final   • RBC 05/03/2021 4.13  3.77 - 5.28 10*6/mm3 Final   • Hemoglobin 05/03/2021 13.5  12.0 - 15.9 g/dL Final   • Hematocrit 05/03/2021 39.5  34.0 - 46.6 % Final   • MCV 05/03/2021 95.6  79.0 - 97.0 fL Final   • MCH 05/03/2021 32.7  26.6 - 33.0 pg Final   • MCHC 05/03/2021 34.2  31.5 - 35.7 g/dL Final   • RDW 05/03/2021 12.7  12.3 - 15.4 % Final   • Platelets 05/03/2021 256  140 - 450 10*3/mm3 Final   • Neutrophil Rel % 05/03/2021 53.2  42.7 - 76.0 % Final   • Lymphocyte Rel % 05/03/2021 35.0  19.6 - 45.3 % Final   • Monocyte Rel % 05/03/2021 8.8  5.0 - 12.0 % Final   • Eosinophil Rel % 05/03/2021 2.1  0.3 - 6.2 % Final   • Basophil Rel % 05/03/2021 0.5  0.0 - 1.5 % Final   • Neutrophils Absolute 05/03/2021 2.98  1.70 - 7.00 10*3/mm3 Final   • Lymphocytes Absolute 05/03/2021 1.96  0.70 - 3.10 10*3/mm3 Final   • Monocytes Absolute 05/03/2021 0.49  0.10 - 0.90 10*3/mm3 Final   •  Eosinophils Absolute 05/03/2021 0.12  0.00 - 0.40 10*3/mm3 Final   • Basophils Absolute 05/03/2021 0.03  0.00 - 0.20 10*3/mm3 Final   • Immature Granulocyte Rel % 05/03/2021 0.4  0.0 - 0.5 % Final   • Immature Grans Absolute 05/03/2021 0.02  0.00 - 0.05 10*3/mm3 Final   • nRBC 05/03/2021 0.0  0.0 - 0.2 /100 WBC Final   • Aldolase 05/03/2021 7.1  3.3 - 10.3 U/L Final   • Glucose 05/03/2021 123* 65 - 99 mg/dL Final   • BUN 05/03/2021 15  6 - 20 mg/dL Final   • Creatinine 05/03/2021 0.82  0.57 - 1.00 mg/dL Final   • eGFR Non  Am 05/03/2021 75  >60 mL/min/1.73 Final    Comment: GFR Normal >60  Chronic Kidney Disease <60  Kidney Failure <15     • eGFR  Am 05/03/2021 91  >60 mL/min/1.73 Final   • BUN/Creatinine Ratio 05/03/2021 18.3  7.0 - 25.0 Final   • Sodium 05/03/2021 138  136 - 145 mmol/L Final   • Potassium 05/03/2021 4.5  3.5 - 5.2 mmol/L Final   • Chloride 05/03/2021 102  98 - 107 mmol/L Final   • Total CO2 05/03/2021 25.8  22.0 - 29.0 mmol/L Final   • Calcium 05/03/2021 9.3  8.6 - 10.5 mg/dL Final   • Total Protein 05/03/2021 6.8  6.0 - 8.5 g/dL Final   • Albumin 05/03/2021 4.40  3.50 - 5.20 g/dL Final   • Globulin 05/03/2021 2.4  gm/dL Final   • A/G Ratio 05/03/2021 1.8  g/dL Final   • Total Bilirubin 05/03/2021 0.4  0.0 - 1.2 mg/dL Final   • Alkaline Phosphatase 05/03/2021 56  39 - 117 U/L Final   • AST (SGOT) 05/03/2021 45* 1 - 32 U/L Final   • ALT (SGPT) 05/03/2021 32  1 - 33 U/L Final   • Creatine Kinase 05/03/2021 110  20 - 180 U/L Final   • Sed Rate 05/03/2021 6  0 - 20 mm/hr Final       EKG Results:  No orders to display       Imaging Results:  CT Lumbar Spine Without Contrast    Result Date: 7/21/2021   1. Multilevel degenerative disc disease L3/4 through L5/S1 with stenosis noted at the L4/5 level where there is chronic appearing disc protrusion and prominent disc osteophyte complex. 2. Multilevel facet arthropathy. 3. No acute fracture or traumatic malalignment.  This report was finalized  on 7/21/2021 12:29 PM by Dr. Gustavo Acosat MD.                  Assessment and Plan   Diagnoses and all orders for this visit:    1. Frontal sinusitis, unspecified chronicity (Primary)  Comments:  continue over the counter medications  start cefdinir, follow up as needed if symptoms worsen or do not improve  Orders:  -     cefdinir (OMNICEF) 300 MG capsule; Take 1 capsule by mouth 2 (Two) Times a Day for 10 days.  Dispense: 20 capsule; Refill: 0  -     cefTRIAXone (ROCEPHIN) injection 1 g    2. Other non-recurrent acute nonsuppurative otitis media of right ear  -     cefdinir (OMNICEF) 300 MG capsule; Take 1 capsule by mouth 2 (Two) Times a Day for 10 days.  Dispense: 20 capsule; Refill: 0  -     cefTRIAXone (ROCEPHIN) injection 1 g        Meds ordered during this visit:  New Medications Ordered This Visit   Medications   • cefdinir (OMNICEF) 300 MG capsule     Sig: Take 1 capsule by mouth 2 (Two) Times a Day for 10 days.     Dispense:  20 capsule     Refill:  0   • cefTRIAXone (ROCEPHIN) injection 1 g       Patient Instructions:  Patient instructions given for the following visit diagnosis:    ICD-10-CM ICD-9-CM   1. Frontal sinusitis, unspecified chronicity  J32.1 473.1   2. Other non-recurrent acute nonsuppurative otitis media of right ear  H65.191 381.00       Follow Up   Return for Next scheduled follow up.        This document has been electronically signed by MARK Aburto  October 26, 2021 13:20 EDT    Patient was given instructions and counseling regarding her condition or for health maintenance advice. Please see specific information pulled into the AVS if appropriate.     Part of this note may be an electronic transcription/translation of spoken language to printed text using the Dragon Dictation System.

## 2021-10-26 NOTE — TELEPHONE ENCOUNTER
Caller: Iraida Heard    Relationship: Self    Best call back number: [phone]     What medication are you requesting:     What are your current symptoms: PAIN IN HEAD AND JAW    How long have you been experiencing symptoms: TWO WEEKS    Have you had these symptoms before:    [x] Yes  [] No    Have you been treated for these symptoms before:   [x] Yes  [] No    If a prescription is needed, what is your preferred pharmacy and phone number: [id number] [address] AT [facility] [address] - [phone] PH - [phone] FX     Additional notes:

## 2021-10-26 NOTE — PATIENT INSTRUCTIONS
Otitis Media, Adult    Otitis media is a condition in which the middle ear is red and swollen (inflamed) and full of fluid. The middle ear is the part of the ear that contains bones for hearing as well as air that helps send sounds to the brain. The condition usually goes away on its own.  What are the causes?  This condition is caused by a blockage in the eustachian tube. The eustachian tube connects the middle ear to the back of the nose. It normally allows air into the middle ear. The blockage is caused by fluid or swelling. Problems that can cause blockage include:  · A cold or infection that affects the nose, mouth, or throat.  · Allergies.  · An irritant, such as tobacco smoke.  · Adenoids that have become large. The adenoids are soft tissue located in the back of the throat, behind the nose and the roof of the mouth.  · Growth or swelling in the upper part of the throat, just behind the nose (nasopharynx).  · Damage to the ear caused by change in pressure. This is called barotrauma.  What are the signs or symptoms?  Symptoms of this condition include:  · Ear pain.  · Fever.  · Problems with hearing.  · Being tired.  · Fluid leaking from the ear.  · Ringing in the ear.  How is this treated?  This condition can go away on its own within 3-5 days. But if the condition is caused by bacteria or does not go away on its own, or if it keeps coming back, your doctor may:  · Give you antibiotic medicines.  · Give you medicines for pain.  Follow these instructions at home:  · Take over-the-counter and prescription medicines only as told by your doctor.  · If you were prescribed an antibiotic medicine, take it as told by your doctor. Do not stop taking the antibiotic even if you start to feel better.  · Keep all follow-up visits as told by your doctor. This is important.  Contact a doctor if:  · You have bleeding from your nose.  · There is a lump on your neck.  · You are not feeling better in 5 days.  · You feel worse  instead of better.  Get help right away if:  · You have pain that is not helped with medicine.  · You have swelling, redness, or pain around your ear.  · You get a stiff neck.  · You cannot move part of your face (paralysis).  · You notice that the bone behind your ear hurts when you touch it.  · You get a very bad headache.  Summary  · Otitis media means that the middle ear is red, swollen, and full of fluid.  · This condition usually goes away on its own.  · If the problem does not go away, treatment may be needed. You may be given medicines to treat the infection or to treat your pain.  · If you were prescribed an antibiotic medicine, take it as told by your doctor. Do not stop taking the antibiotic even if you start to feel better.  · Keep all follow-up visits as told by your doctor. This is important.  This information is not intended to replace advice given to you by your health care provider. Make sure you discuss any questions you have with your health care provider.  Document Revised: 11/19/2020 Document Reviewed: 11/19/2020  [employer] Patient Education © 2021 [employer]  Sinusitis, Adult  Sinusitis is inflammation of your sinuses. Sinuses are hollow spaces in the bones around your face. Your sinuses are located:  · Around your eyes.  · In the middle of your forehead.  · Behind your nose.  · In your cheekbones.  Mucus normally drains out of your sinuses. When your nasal tissues become inflamed or swollen, mucus can become trapped or blocked. This allows bacteria, viruses, and fungi to grow, which leads to infection. Most infections of the sinuses are caused by a virus.  Sinusitis can develop quickly. It can last for up to 4 weeks (acute) or for more than 12 weeks (chronic). Sinusitis often develops after a cold.  What are the causes?  This condition is caused by anything that creates swelling in the sinuses or stops mucus from draining. This includes:  · Allergies.  · Asthma.  · Infection from bacteria or  viruses.  · Deformities or blockages in your nose or sinuses.  · Abnormal growths in the nose (nasal polyps).  · Pollutants, such as chemicals or irritants in the air.  · Infection from fungi (rare).  What increases the risk?  You are more likely to develop this condition if you:  · Have a weak body defense system (immune system).  · Do a lot of swimming or diving.  · Overuse nasal sprays.  · Smoke.  What are the signs or symptoms?  The main symptoms of this condition are pain and a feeling of pressure around the affected sinuses. Other symptoms include:  · Stuffy nose or congestion.  · Thick drainage from your nose.  · Swelling and warmth over the affected sinuses.  · Headache.  · Upper toothache.  · A cough that may get worse at night.  · Extra mucus that collects in the throat or the back of the nose (postnasal drip).  · Decreased sense of smell and taste.  · Fatigue.  · A fever.  · Sore throat.  · Bad breath.  How is this diagnosed?  This condition is diagnosed based on:  · Your symptoms.  · Your medical history.  · A physical exam.  · Tests to find out if your condition is acute or chronic. This may include:  ? Checking your nose for nasal polyps.  ? Viewing your sinuses using a device that has a light (endoscope).  ? Testing for allergies or bacteria.  ? Imaging tests, such as an MRI or CT scan.  In rare cases, a bone biopsy may be done to rule out more serious types of fungal sinus disease.  How is this treated?  Treatment for sinusitis depends on the cause and whether your condition is chronic or acute.  · If caused by a virus, your symptoms should go away on their own within 10 days. You may be given medicines to relieve symptoms. They include:  ? Medicines that shrink swollen nasal passages (topical intranasal decongestants).  ? Medicines that treat allergies (antihistamines).  ? A spray that eases inflammation of the nostrils (topical intranasal corticosteroids).  ? Rinses that help get rid of thick mucus  in your nose (nasal saline washes).  · If caused by bacteria, your health care provider may recommend waiting to see if your symptoms improve. Most bacterial infections will get better without antibiotic medicine. You may be given antibiotics if you have:  ? A severe infection.  ? A weak immune system.  · If caused by narrow nasal passages or nasal polyps, you may need to have surgery.  Follow these instructions at home:  Medicines  · Take, use, or apply over-the-counter and prescription medicines only as told by your health care provider. These may include nasal sprays.  · If you were prescribed an antibiotic medicine, take it as told by your health care provider. Do not stop taking the antibiotic even if you start to feel better.  Hydrate and humidify    · Drink enough fluid to keep your urine pale yellow. Staying hydrated will help to thin your mucus.  · Use a cool mist humidifier to keep the humidity level in your home above 50%.  · Inhale steam for 10-15 minutes, 3-4 times a day, or as told by your health care provider. You can do this in the bathroom while a hot shower is running.  · Limit your exposure to cool or dry air.    Rest  · Rest as much as possible.  · Sleep with your head raised (elevated).  · Make sure you get enough sleep each night.  General instructions    · Apply a warm, moist washcloth to your face 3-4 times a day or as told by your health care provider. This will help with discomfort.  · Wash your hands often with soap and water to reduce your exposure to germs. If soap and water are not available, use hand .  · Do not smoke. Avoid being around people who are smoking (secondhand smoke).  · Keep all follow-up visits as told by your health care provider. This is important.    Contact a health care provider if:  · You have a fever.  · Your symptoms get worse.  · Your symptoms do not improve within 10 days.  Get help right away if:  · You have a severe headache.  · You have persistent  vomiting.  · You have severe pain or swelling around your face or eyes.  · You have vision problems.  · You develop confusion.  · Your neck is stiff.  · You have trouble breathing.  Summary  · Sinusitis is soreness and inflammation of your sinuses. Sinuses are hollow spaces in the bones around your face.  · This condition is caused by nasal tissues that become inflamed or swollen. The swelling traps or blocks the flow of mucus. This allows bacteria, viruses, and fungi to grow, which leads to infection.  · If you were prescribed an antibiotic medicine, take it as told by your health care provider. Do not stop taking the antibiotic even if you start to feel better.  · Keep all follow-up visits as told by your health care provider. This is important.  This information is not intended to replace advice given to you by your health care provider. Make sure you discuss any questions you have with your health care provider.  Document Revised: 05/20/2019 Document Reviewed: 05/20/2019  [employer] Patient Education © 2021 [employer]

## 2021-11-01 ENCOUNTER — TELEPHONE (OUTPATIENT)
Dept: FAMILY MEDICINE CLINIC | Facility: CLINIC | Age: 46
End: 2021-11-01

## 2021-11-01 ENCOUNTER — OFFICE VISIT (OUTPATIENT)
Dept: FAMILY MEDICINE CLINIC | Facility: CLINIC | Age: 46
End: 2021-11-01

## 2021-11-01 VITALS
BODY MASS INDEX: 31.02 KG/M2 | DIASTOLIC BLOOD PRESSURE: 96 MMHG | WEIGHT: 193 LBS | HEIGHT: 66 IN | HEART RATE: 91 BPM | TEMPERATURE: 96.8 F | OXYGEN SATURATION: 98 % | SYSTOLIC BLOOD PRESSURE: 118 MMHG

## 2021-11-01 DIAGNOSIS — E78.2 MIXED HYPERLIPIDEMIA: Chronic | ICD-10-CM

## 2021-11-01 DIAGNOSIS — H92.09 EAR ACHE: ICD-10-CM

## 2021-11-01 DIAGNOSIS — J01.00 ACUTE NON-RECURRENT MAXILLARY SINUSITIS: ICD-10-CM

## 2021-11-01 DIAGNOSIS — R51.9 NONINTRACTABLE HEADACHE, UNSPECIFIED CHRONICITY PATTERN, UNSPECIFIED HEADACHE TYPE: ICD-10-CM

## 2021-11-01 DIAGNOSIS — R09.81 SINUS CONGESTION: Primary | ICD-10-CM

## 2021-11-01 DIAGNOSIS — I10 ESSENTIAL HYPERTENSION: Chronic | ICD-10-CM

## 2021-11-01 LAB
FLUAV RNA RESP QL NAA+PROBE: NOT DETECTED
FLUBV RNA RESP QL NAA+PROBE: NOT DETECTED
SARS-COV-2 RNA RESP QL NAA+PROBE: NOT DETECTED

## 2021-11-01 PROCEDURE — 99214 OFFICE O/P EST MOD 30 MIN: CPT | Performed by: PHYSICIAN ASSISTANT

## 2021-11-01 PROCEDURE — 87636 SARSCOV2 & INF A&B AMP PRB: CPT | Performed by: PHYSICIAN ASSISTANT

## 2021-11-01 RX ORDER — PREDNISONE 20 MG/1
TABLET ORAL
Qty: 10 TABLET | Refills: 0 | Status: SHIPPED | OUTPATIENT
Start: 2021-11-01 | End: 2021-11-11

## 2021-11-01 NOTE — TELEPHONE ENCOUNTER
Caller: FélixIraida tijerina    Relationship: Self    Best call back number: [phone]    What medications are you currently taking:   Current Outpatient Medications on File Prior to Visit   Medication Sig Dispense Refill   • Calcium Polycarbophil (FIBER LAXATIVE PO) Take  by mouth.     • cefdinir (OMNICEF) 300 MG capsule Take 1 capsule by mouth 2 (Two) Times a Day for 10 days. 20 capsule 0   • cetirizine (zyrTEC) 10 MG tablet Take 1 tablet by mouth Daily As Needed for Allergies. 30 tablet 5   • Cranberry 125 MG tablet Take  by mouth Daily.     • dicyclomine (BENTYL) 20 MG tablet Take  by mouth 2 (two) times a day.     • FLUoxetine (PROzac) 20 MG capsule Take 1 capsule by mouth Every Morning. 90 capsule 3   • fluticasone (Flonase) 50 MCG/ACT nasal spray 2 sprays into the nostril(s) as directed by provider Daily. Administer 2 sprays both nostrils once daily 16 g 5   • montelukast (SINGULAIR) 10 MG tablet Take 1 tablet by mouth Daily. 90 tablet 3   • omeprazole (priLOSEC) 20 MG capsule Take 1 capsule by mouth Daily. 90 capsule 3   • Probiotic Product (Risaquad-2) capsule capsule Take 1 capsule by mouth Daily.     • rosuvastatin (CRESTOR) 20 MG tablet Take 1 tablet by mouth Daily. 90 tablet 1     No current facility-administered medications on file prior to visit.        Who prescribed you this medication:  ABNER HANKS    What are your concerns: THE ANTI BIOTIC PRESCRIBED TO ME 10/26/21 HAS NOT COMPLETELY WORKED.  I STILL HAVE AN EAR AND SINUS INFECTIONS    How long have you had these concerns: A COUPLE OF WEEKS.

## 2021-11-03 NOTE — PROGRESS NOTES
"Subjective   Iraida Heard is a 46 y.o. female.       Chief Complaint -sinus congestion    History of Present Illness -    ROS    Sinus congestion-  Patient complains of right-sided sinus congestion with nasal congestion, headache, and green nasal discharge over the past 3 weeks.  She is currently on Omnicef with minimal improvement.  She also reports that she had some right sided upper teeth pain prior to starting the Omnicef.  Minimal relief with use of [place] pot, Flonase and Mucinex.    Hypertension-may be elevated today due to acute illness.  Blood pressure usually controlled with diet    Hyperlipidemia-stable with Crestor and diet    The following portions of the patient's history were reviewed and updated as appropriate: allergies, current medications, past family history, past medical history, past social history, past surgical history and problem list.    Review of Systems    Objective  Vital signs:  /96   Pulse 91   Temp 96.8 °F (36 °C) (Temporal)   Ht 167.6 cm (66\")   Wt 87.5 kg (193 lb)   SpO2 98%   BMI 31.15 kg/m²     Physical Exam  Vitals and nursing note reviewed.   Constitutional:       Appearance: Normal appearance. She is well-developed.   HENT:      Head: Normocephalic and atraumatic.      Right Ear: Tympanic membrane normal.      Left Ear: Tympanic membrane normal.      Ears:      Comments: Tenderness noted over right maxillary and frontal sinus area     Nose: Congestion present.      Mouth/Throat:      Mouth: Mucous membranes are moist.      Pharynx: Posterior oropharyngeal erythema present. No oropharyngeal exudate.   Eyes:      Extraocular Movements: Extraocular movements intact.      Conjunctiva/sclera: Conjunctivae normal.   Neck:      Thyroid: No thyromegaly.      Trachea: No tracheal deviation.   Cardiovascular:      Rate and Rhythm: Normal rate and regular rhythm.      Heart sounds: Normal heart sounds. No murmur heard.      Pulmonary:      Effort: Pulmonary effort is " normal. No respiratory distress.      Breath sounds: Normal breath sounds. No wheezing.   Abdominal:      General: Bowel sounds are normal.      Palpations: Abdomen is soft.      Tenderness: There is no abdominal tenderness. There is no guarding.   Musculoskeletal:         General: No tenderness. Normal range of motion.      Cervical back: Normal range of motion and neck supple.   Lymphadenopathy:      Cervical: No cervical adenopathy.   Skin:     General: Skin is warm and dry.      Findings: No rash.   Neurological:      General: No focal deficit present.      Mental Status: She is alert and oriented to person, place, and time.   Psychiatric:         Mood and Affect: Mood normal.         Behavior: Behavior normal.         Thought Content: Thought content normal.         The following data was reviewed by: ZITA Mckeon on 11/01/2021:  CMP    CMP 2/25/21 3/4/21 5/3/21 5/3/21      0855 0855   Glucose 108 (A) 110 (A) 124 (A) 123 (A)   BUN 14 16 15 15   Creatinine 0.83 0.90 0.81 0.82   eGFR Non  Am 75 68 76 75   eGFR African Am   93 91   Sodium 138 141 140 138   Potassium 4.0 4.3 4.3 4.5   Chloride 103 103 104 102   Calcium 8.9 9.1 9.3 9.3   Total Protein   6.8 6.8   Albumin 4.35 4.15 4.30 4.40   Globulin   2.5 2.4   Total Bilirubin 0.3 0.2 0.4 0.4   Alkaline Phosphatase 64 65 56 56   AST (SGOT) 27 18 46 (A) 45 (A)   ALT (SGPT) 24 18 33 32   (A) Abnormal value       Comments are available for some flowsheets but are not being displayed.           CBC w/diff    CBC w/Diff 2/25/21 5/3/21   WBC 7.30 5.60   RBC 4.35 4.13   Hemoglobin 13.7 13.5   Hematocrit 42.4 39.5   MCV 97.5 (A) 95.6   MCH 31.5 32.7   MCHC 32.3 34.2   RDW 12.0 (A) 12.7   Platelets 248 256   Neutrophil Rel % 52.7 53.2   Immature Granulocyte Rel % 0.4    Lymphocyte Rel % 36.6 35.0   Monocyte Rel % 7.8 8.8   Eosinophil Rel % 2.1 2.1   Basophil Rel % 0.4 0.5   (A) Abnormal value            Lipid Panel    Lipid Panel 3/4/21 5/3/21 5/10/21    Total Cholesterol 220 (A) 148 142   Triglycerides 228 (A) 143 158 (A)   HDL Cholesterol  43    VLDL Cholesterol  25    LDL Cholesterol   80    (A) Abnormal value       Comments are available for some flowsheets but are not being displayed.                  Assessment/Plan     Diagnoses and all orders for this visit:    1. Sinus congestion (Primary)  -     COVID-19 and FLU A/B PCR - Swab, Nasopharynx    2. Nonintractable headache, unspecified chronicity pattern, unspecified headache type    3. Ear ache  -     COVID-19 and FLU A/B PCR - Swab, Nasopharynx    4. Acute non-recurrent maxillary sinusitis  Comments:  Continue Omnicef  Start prednisone  Conservative measures including warm compresses advised  RTC if symptoms do not improve within 5 days  Orders:  -     predniSONE (DELTASONE) 20 MG tablet; 2 daily  Dispense: 10 tablet; Refill: 0    5. Essential hypertension  Comments:  Likely elevated today due to pain and acute illness  Advised home monitoring BP and pulse daily  Advised low-sodium diet      6. Mixed hyperlipidemia  Comments:  Advised a low-cholesterol diet  Continue Crestor            Patient was given instructions and counseling regarding his condition or for health maintenance advice. Please see specific information pulled into the AVS if appropriate      This document has been electronically signed by:  Teresa Esteban PA-C

## 2021-11-03 NOTE — PATIENT INSTRUCTIONS
Sinusitis, Adult  Sinusitis is soreness and swelling (inflammation) of your sinuses. Sinuses are hollow spaces in the bones around your face. They are located:  · Around your eyes.  · In the middle of your forehead.  · Behind your nose.  · In your cheekbones.  Your sinuses and nasal passages are lined with a fluid called mucus. Mucus drains out of your sinuses. Swelling can trap mucus in your sinuses. This lets germs (bacteria, virus, or fungus) grow, which leads to infection. Most of the time, this condition is caused by a virus.  What are the causes?  This condition is caused by:  · Allergies.  · Asthma.  · Germs.  · Things that block your nose or sinuses.  · Growths in the nose (nasal polyps).  · Chemicals or irritants in the air.  · Fungus (rare).  What increases the risk?  You are more likely to develop this condition if:  · You have a weak body defense system (immune system).  · You do a lot of swimming or diving.  · You use nasal sprays too much.  · You smoke.  What are the signs or symptoms?  The main symptoms of this condition are pain and a feeling of pressure around the sinuses. Other symptoms include:  · Stuffy nose (congestion).  · Runny nose (drainage).  · Swelling and warmth in the sinuses.  · Headache.  · Toothache.  · A cough that may get worse at night.  · Mucus that collects in the throat or the back of the nose (postnasal drip).  · Being unable to smell and taste.  · Being very tired (fatigue).  · A fever.  · Sore throat.  · Bad breath.  How is this diagnosed?  This condition is diagnosed based on:  · Your symptoms.  · Your medical history.  · A physical exam.  · Tests to find out if your condition is short-term (acute) or long-term (chronic). Your doctor may:  ? Check your nose for growths (polyps).  ? Check your sinuses using a tool that has a light (endoscope).  ? Check for allergies or germs.  ? Do imaging tests, such as an MRI or CT scan.  How is this treated?  Treatment for this condition  depends on the cause and whether it is short-term or long-term.  · If caused by a virus, your symptoms should go away on their own within 10 days. You may be given medicines to relieve symptoms. They include:  ? Medicines that shrink swollen tissue in the nose.  ? Medicines that treat allergies (antihistamines).  ? A spray that treats swelling of the nostrils.   ? Rinses that help get rid of thick mucus in your nose (nasal saline washes).  · If caused by bacteria, your doctor may wait to see if you will get better without treatment. You may be given antibiotic medicine if you have:  ? A very bad infection.  ? A weak body defense system.  · If caused by growths in the nose, you may need to have surgery.  Follow these instructions at home:  Medicines  · Take, use, or apply over-the-counter and prescription medicines only as told by your doctor. These may include nasal sprays.  · If you were prescribed an antibiotic medicine, take it as told by your doctor. Do not stop taking the antibiotic even if you start to feel better.  Hydrate and humidify    · Drink enough water to keep your pee (urine) pale yellow.  · Use a cool mist humidifier to keep the humidity level in your home above 50%.  · Breathe in steam for 10-15 minutes, 3-4 times a day, or as told by your doctor. You can do this in the bathroom while a hot shower is running.  · Try not to spend time in cool or dry air.    Rest  · Rest as much as you can.  · Sleep with your head raised (elevated).  · Make sure you get enough sleep each night.  General instructions    · Put a warm, moist washcloth on your face 3-4 times a day, or as often as told by your doctor. This will help with discomfort.  · Wash your hands often with soap and water. If there is no soap and water, use hand .  · Do not smoke. Avoid being around people who are smoking (secondhand smoke).  · Keep all follow-up visits as told by your doctor. This is important.    Contact a doctor if:  · You  have a fever.  · Your symptoms get worse.  · Your symptoms do not get better within 10 days.  Get help right away if:  · You have a very bad headache.  · You cannot stop throwing up (vomiting).  · You have very bad pain or swelling around your face or eyes.  · You have trouble seeing.  · You feel confused.  · Your neck is stiff.  · You have trouble breathing.  Summary  · Sinusitis is swelling of your sinuses. Sinuses are hollow spaces in the bones around your face.  · This condition is caused by tissues in your nose that become inflamed or swollen. This traps germs. These can lead to infection.  · If you were prescribed an antibiotic medicine, take it as told by your doctor. Do not stop taking it even if you start to feel better.  · Keep all follow-up visits as told by your doctor. This is important.  This information is not intended to replace advice given to you by your health care provider. Make sure you discuss any questions you have with your health care provider.  Document Revised: 05/20/2019 Document Reviewed: 05/20/2019  [employer] Patient Education © 2021 [employer]

## 2021-11-18 ENCOUNTER — OFFICE VISIT (OUTPATIENT)
Dept: FAMILY MEDICINE CLINIC | Facility: CLINIC | Age: 46
End: 2021-11-18

## 2021-11-18 VITALS
RESPIRATION RATE: 14 BRPM | BODY MASS INDEX: 31.18 KG/M2 | DIASTOLIC BLOOD PRESSURE: 60 MMHG | HEIGHT: 66 IN | OXYGEN SATURATION: 96 % | TEMPERATURE: 97.3 F | HEART RATE: 92 BPM | SYSTOLIC BLOOD PRESSURE: 112 MMHG | WEIGHT: 194 LBS

## 2021-11-18 DIAGNOSIS — I10 ESSENTIAL HYPERTENSION: ICD-10-CM

## 2021-11-18 DIAGNOSIS — E78.2 MIXED HYPERLIPIDEMIA: ICD-10-CM

## 2021-11-18 DIAGNOSIS — G47.33 OSA (OBSTRUCTIVE SLEEP APNEA): ICD-10-CM

## 2021-11-18 DIAGNOSIS — I47.29 NONSUSTAINED VENTRICULAR TACHYCARDIA (HCC): ICD-10-CM

## 2021-11-18 DIAGNOSIS — J30.9 CHRONIC ALLERGIC RHINITIS: ICD-10-CM

## 2021-11-18 DIAGNOSIS — Z12.31 ENCOUNTER FOR SCREENING MAMMOGRAM FOR BREAST CANCER: ICD-10-CM

## 2021-11-18 DIAGNOSIS — F41.0 PANIC ATTACKS: ICD-10-CM

## 2021-11-18 DIAGNOSIS — R73.03 PREDIABETES: ICD-10-CM

## 2021-11-18 DIAGNOSIS — Z00.00 HEALTHCARE MAINTENANCE: ICD-10-CM

## 2021-11-18 DIAGNOSIS — M54.59 MECHANICAL LOW BACK PAIN: ICD-10-CM

## 2021-11-18 DIAGNOSIS — J32.1 CHRONIC FRONTAL SINUSITIS: Primary | ICD-10-CM

## 2021-11-18 PROBLEM — R07.2 PRECORDIAL CHEST PAIN: Status: RESOLVED | Noted: 2021-02-09 | Resolved: 2021-11-18

## 2021-11-18 PROCEDURE — 1159F MED LIST DOCD IN RCRD: CPT | Performed by: GENERAL PRACTICE

## 2021-11-18 PROCEDURE — 1125F AMNT PAIN NOTED PAIN PRSNT: CPT | Performed by: GENERAL PRACTICE

## 2021-11-18 PROCEDURE — 96160 PT-FOCUSED HLTH RISK ASSMT: CPT | Performed by: GENERAL PRACTICE

## 2021-11-18 PROCEDURE — G0439 PPPS, SUBSEQ VISIT: HCPCS | Performed by: GENERAL PRACTICE

## 2021-11-18 PROCEDURE — 1170F FXNL STATUS ASSESSED: CPT | Performed by: GENERAL PRACTICE

## 2021-11-18 RX ORDER — LEVOFLOXACIN 500 MG/1
500 TABLET, FILM COATED ORAL DAILY
Qty: 10 TABLET | Refills: 0 | Status: SHIPPED | OUTPATIENT
Start: 2021-11-18 | End: 2021-11-22

## 2021-11-18 NOTE — PATIENT INSTRUCTIONS
Advance Directive    Advance directives are legal documents that let you make choices ahead of time about your health care and medical treatment in case you become unable to communicate for yourself. Advance directives are a way for you to make known your wishes to family, friends, and health care providers. This can let others know about your end-of-life care if you become unable to communicate.  Discussing and writing advance directives should happen over time rather than all at once. Advance directives can be changed depending on your situation and what you want, even after you have signed the advance directives.  There are different types of advance directives, such as:  · Medical power of .  · Living will.  · Do not resuscitate (DNR) or do not attempt resuscitation (DNAR) order.  Health care proxy and medical power of   A health care proxy is also called a health care agent. This is a person who is appointed to make medical decisions for you in cases where you are unable to make the decisions yourself. Generally, people choose someone they know well and trust to represent their preferences. Make sure to ask this person for an agreement to act as your proxy. A proxy may have to exercise judgment in the event of a medical decision for which your wishes are not known.  A medical power of  is a legal document that names your health care proxy. Depending on the laws in your state, after the document is written, it may also need to be:  · Signed.  · Notarized.  · Dated.  · Copied.  · Witnessed.  · Incorporated into your medical record.  You may also want to appoint someone to manage your money in a situation in which you are unable to do so. This is called a durable power of  for finances. It is a separate legal document from the durable power of  for health care. You may choose the same person or someone different from your health care proxy to act as your agent in money  matters.  If you do not appoint a proxy, or if there is a concern that the proxy is not acting in your best interests, a court may appoint a guardian to act on your behalf.  Living will  A living will is a set of instructions that state your wishes about medical care when you cannot express them yourself. Health care providers should keep a copy of your living will in your medical record. You may want to give a copy to family members or friends. To alert caregivers in case of an emergency, you can place a card in your wallet to let them know that you have a living will and where they can find it. A living will is used if you become:  · Terminally ill.  · Disabled.  · Unable to communicate or make decisions.  Items to consider in your living will include:  · To use or not to use life-support equipment, such as dialysis machines and breathing machines (ventilators).  · A DNR or DNAR order. This tells health care providers not to use cardiopulmonary resuscitation (CPR) if breathing or heartbeat stops.  · To use or not to use tube feeding.  · To be given or not to be given food and fluids.  · Comfort (palliative) care when the goal becomes comfort rather than a cure.  · Donation of organs and tissues.  A living will does not give instructions for distributing your money and property if you should pass away.  DNR or DNAR  A DNR or DNAR order is a request not to have CPR in the event that your heart stops beating or you stop breathing. If a DNR or DNAR order has not been made and shared, a health care provider will try to help any patient whose heart has stopped or who has stopped breathing. If you plan to have surgery, talk with your health care provider about how your DNR or DNAR order will be followed if problems occur.  What if I do not have an advance directive?  If you do not have an advance directive, some states assign family decision makers to act on your behalf based on how closely you are related to them. Each  state has its own laws about advance directives. You may want to check with your health care provider, , or state representative about the laws in your state.  Summary  · Advance directives are the legal documents that allow you to make choices ahead of time about your health care and medical treatment in case you become unable to tell others about your care.  · The process of discussing and writing advance directives should happen over time. You can change the advance directives, even after you have signed them.  · Advance directives include DNR or DNAR orders, living coburn, and designating an agent as your medical power of .  This information is not intended to replace advice given to you by your health care provider. Make sure you discuss any questions you have with your health care provider.  Document Revised: 01/28/2021 Document Reviewed: 07/16/2020  [employer] Patient Education © 2021 [employer]      Mammogram  A mammogram is a low energy X-ray of the breasts that is done to check for abnormal changes. This procedure can screen for and detect any changes that may indicate breast cancer. Mammograms are regularly done on women. A man may have a mammogram if he has a lump or swelling in his breast. A mammogram can also identify other changes and variations in the breast, such as:  · Inflammation of the breast tissue (mastitis).  · An infected area that contains a collection of pus (abscess).  · A fluid-filled sac (cyst).  · Fibrocystic changes. This is when breast tissue becomes denser, which can make the tissue feel rope-like or uneven under the skin.  · Tumors that are not cancerous (benign).  Tell a health care provider:  · About any allergies you have.  · If you have breast implants.  · If you have had previous breast disease, biopsy, or surgery.  · If you are breastfeeding.  · If you are younger than age 25.  · If you have a family history of breast cancer.  · Whether you are pregnant or may  be pregnant.  What are the risks?  Generally, this is a safe procedure. However, problems may occur, including:  · Exposure to radiation. Radiation levels are very low with this test.  · The results being misinterpreted.  · The need for further tests.  · The inability of the mammogram to detect certain cancers.  What happens before the procedure?  · Schedule your test about 1-2 weeks after your menstrual period if you are still menstruating. This is usually when your breasts are the least tender.  · If you have had a mammogram done at a different facility in the past, get the mammogram X-rays or have them sent to your current exam facility. The new and old images will be compared.  · Wash your breasts and underarms on the day of the test.  · Do not wear deodorants, perfumes, lotions, or powders anywhere on your body on the day of the test.  · Remove any jewelry from your neck.  · Wear clothes that you can change into and out of easily.  What happens during the procedure?    · You will undress from the waist up and put on a gown that opens in the front.  · You will  front of the X-ray machine.  · Each breast will be placed between two plastic or glass plates. The plates will compress your breast for a few seconds. Try to stay as relaxed as possible during the procedure. This does not cause any harm to your breasts and any discomfort you feel will be very brief.  · X-rays will be taken from different angles of each breast.  The procedure may vary among health care providers and hospitals.  What happens after the procedure?  · The mammogram will be examined by a specialist (radiologist).  · You may need to repeat certain parts of the test, depending on the quality of the images. This is commonly done if the radiologist needs a better view of the breast tissue.  · You may resume your normal activities.  · It is up to you to get the results of your procedure. Ask your health care provider, or the department that  is doing the procedure, when your results will be ready.  Summary  · A mammogram is a low energy X-ray of the breasts that is done to check for abnormal changes. A man may have a mammogram if he has a lump or swelling in his breast.  · If you have had a mammogram done at a different facility in the past, get the mammogram X-rays or have them sent to your current exam facility in order to compare them.  · Schedule your test about 1-2 weeks after your menstrual period if you are still menstruating.  · For this test, each breast will be placed between two plastic or glass plates. The plates will compress your breast for a few seconds.  · Ask when your test results will be ready. Make sure you get your test results.  This information is not intended to replace advice given to you by your health care provider. Make sure you discuss any questions you have with your health care provider.  Document Revised: 08/08/2019 Document Reviewed: 08/08/2019  [employer] Patient Education © 2021 [employer]

## 2021-11-18 NOTE — PROGRESS NOTES
The ABCs of the Annual Wellness Visit  Subsequent Medicare Wellness Visit    Chief Complaint  Subsequent Medicare Wellness Visit    Subjective    History of Present Illness:  Iraida Heard is a 46 y.o. female who presents for a Subsequent Medicare Wellness Visit.    Chronic Allergic Rhinitis  She complains of persistent nasal congestion, postnasal drip, right ear fullness, and right lower lateral facial pressure.  She continues to deny any other upper respiratory tract symptoms and there is no history of any fever or chills. She remains on cetirizine and montelukast with no apparent side effects.  She discontinued nasal fluticasone due to irritation.  She recently completed a course of cefdinir and prednisone.  She gives a history of previous immunotherapy and has also had sinus surgery twice    Chest Pain  She denies any chest pain since last here.  She denies any recent palpitations and there is no history of any shortness of breath, lightheadedness, calf pain, or swelling. Nuclear stress testing and an echocardiogram performed on 3/4/2021 were unremarkable.  Cardiac event monitoring performed between 2/17/2021 and 3/2/2021 revealed a 12 beat episode of ventricular tachycardia.     Hyperlipidemia  She has been following an appropriate diet but admits to getting less exercise over the last few years with a 10 to 15 pound weight gain.  She remains on rosuvastatin with no apparent side effects.    Panic Attacks  She admits to intermittent episodes of nervousness, worrying, flushing, and diaphoresis.  She denies persistent anxiety and there is no history of depression, loss of interest in activities, or suicide ideation.  She remains on fluoxetine    Low Back Pain  She gives a 4 to 5 year history of intermittent low back pain.  This has been worse over the last 6 months.  There is no history of any recent strain or trauma nor any change in her activities.  The pain is described as a bilateral lower ache radiating to  her right posterior thigh and calf.  The pain in her back has been worse than that in her leg.  She continues to deny any weakness, numbness, or tingling and there is been no change in her bowel/bladder control.  The pain increases with activity improves with rest.  Plain films of the lumbar spine performed on 5/19/2021 were reported as showing mild degenerative disc disease at L4-5.  Noncontrast CT of the lumbar spine performed on 7/21/2021 was reported as showing multilevel DDD and osteoarthritis with mild to moderate central canal stenosis at L4-5.  She has seen both a physical therapist and a chiropractor in the past with little improvement in her symptoms.  She is currently followed by pain management and has undergone several injections with only a transient improvement each time    The following portions of the patient's history were reviewed and   updated as appropriate: allergies, current medications, past family history, past medical history, past social history, past surgical history and problem list.    Compared to one year ago, the patient feels her physical   health is the same.    Compared to one year ago, the patient feels her mental   health is the same.    Recent Hospitalizations:  She was not admitted to the hospital during the last year.     Current Medical Providers:  Patient Care Team:  Amari Mixon MD as PCP - General (Family Medicine)    Outpatient Medications Prior to Visit   Medication Sig Dispense Refill   • Calcium Polycarbophil (FIBER LAXATIVE PO) Take  by mouth.     • cetirizine (zyrTEC) 10 MG tablet Take 1 tablet by mouth Daily As Needed for Allergies. 30 tablet 5   • Cranberry 125 MG tablet Take  by mouth Daily.     • dicyclomine (BENTYL) 20 MG tablet Take  by mouth 2 (two) times a day.     • FLUoxetine (PROzac) 20 MG capsule Take 1 capsule by mouth Every Morning. 90 capsule 3   • montelukast (SINGULAIR) 10 MG tablet Take 1 tablet by mouth Daily. 90 tablet 3   • omeprazole  (priLOSEC) 20 MG capsule Take 1 capsule by mouth Daily. 90 capsule 3   • Probiotic Product (Risaquad-2) capsule capsule Take 1 capsule by mouth Daily.     • rosuvastatin (CRESTOR) 20 MG tablet Take 1 tablet by mouth Daily. 90 tablet 1   • fluticasone (Flonase) 50 MCG/ACT nasal spray 2 sprays into the nostril(s) as directed by provider Daily. Administer 2 sprays both nostrils once daily 16 g 5     No facility-administered medications prior to visit.     No opioid medication identified on active medication list. I have reviewed chart for other potential  high risk medication/s and harmful drug interactions in the elderly.          Aspirin is not on active medication list.  Aspirin use is not indicated based on review of current medical condition/s. Risk of harm outweighs potential benefits.  .    Patient Active Problem List   Diagnosis   • Essential hypertension   • Nonsustained ventricular tachycardia (HCC)   • Mixed hyperlipidemia   • KATALINA (obstructive sleep apnea)   • Panic attacks   • Hot flashes   • Healthcare maintenance   • Prediabetes   • Mechanical low back pain   • Chronic allergic rhinitis   • Chronic frontal sinusitis     Advance Care Planning  Advance Directive is not on file.  ACP discussion was held with the patient during this visit. Patient does not have an advance directive, information provided.    Review of Systems   Constitutional: Positive for fatigue. Negative for appetite change, chills, diaphoresis and fever.   HENT: Positive for congestion, ear pain (right ear pressure), postnasal drip, rhinorrhea and sinus pressure. Negative for sneezing, sore throat, tinnitus and voice change.    Eyes: Negative for visual disturbance.   Respiratory: Negative for cough, shortness of breath and wheezing.    Cardiovascular: Negative for chest pain, palpitations and leg swelling.   Gastrointestinal: Negative for abdominal pain, blood in stool, constipation, diarrhea, nausea and vomiting.   Endocrine: Negative  "for polydipsia and polyuria.   Genitourinary: Negative for dysuria, frequency, hematuria and urgency.   Musculoskeletal: Positive for back pain. Negative for arthralgias, joint swelling and myalgias.   Skin: Negative for rash.   Neurological: Negative for weakness, numbness and confusion.   Psychiatric/Behavioral: Positive for sleep disturbance. Negative for decreased concentration, dysphoric mood and suicidal ideas. The patient is not nervous/anxious.         Objective    Vitals:    11/18/21 0954   BP: 112/60   Pulse: 92   Resp: 14   Temp: 97.3 °F (36.3 °C)   TempSrc: Temporal   SpO2: 96%   Weight: 88 kg (194 lb)   Height: 167.6 cm (66\")     BMI Readings from Last 1 Encounters:   11/18/21 31.31 kg/m²   BMI is above normal parameters. Recommendations include: exercise counseling and nutrition counseling    Does the patient have evidence of cognitive impairment? No    Physical Exam  Constitutional:       General: She is not in acute distress.     Appearance: Normal appearance. She is well-developed. She is not diaphoretic.      Comments: Accompanied by her mother.  Bright and in good spirits. No apparent distress. No pallor, jaundice, diaphoresis, or cyanosis.   HENT:      Head: Atraumatic.      Salivary Glands: Right salivary gland is tender.      Right Ear: Tympanic membrane, ear canal and external ear normal.      Left Ear: Tympanic membrane, ear canal and external ear normal.      Mouth/Throat:      Mouth: Mucous membranes are moist.      Pharynx: No posterior oropharyngeal erythema.   Eyes:      Conjunctiva/sclera: Conjunctivae normal.   Neck:      Thyroid: No thyroid mass or thyromegaly.      Vascular: No carotid bruit or JVD.      Trachea: Trachea normal. No tracheal deviation.      Comments: Tender right tonsillar node  Cardiovascular:      Rate and Rhythm: Normal rate and regular rhythm.      Heart sounds: Normal heart sounds, S1 normal and S2 normal. No murmur heard.  No gallop.    Pulmonary:      Effort: " Pulmonary effort is normal.      Breath sounds: Normal breath sounds.   Chest:   Breasts:      Right: No supraclavicular adenopathy.      Left: No supraclavicular adenopathy.       Abdominal:      General: Bowel sounds are normal. There is no distension or abdominal bruit.      Palpations: Abdomen is soft. There is no hepatomegaly, splenomegaly or mass.      Tenderness: There is no abdominal tenderness.      Hernia: No hernia is present.   Musculoskeletal:      Lumbar back: Negative right straight leg raise test and negative left straight leg raise test.      Right lower leg: No edema.      Left lower leg: No edema.   Lymphadenopathy:      Head:      Right side of head: No submental, submandibular, tonsillar, preauricular, posterior auricular or occipital adenopathy.      Left side of head: No submental, submandibular, tonsillar, preauricular, posterior auricular or occipital adenopathy.      Cervical: No cervical adenopathy.      Upper Body:      Right upper body: No supraclavicular adenopathy.      Left upper body: No supraclavicular adenopathy.   Skin:     General: Skin is warm.      Coloration: Skin is not cyanotic, jaundiced or pale.      Findings: No rash.      Nails: There is no clubbing.   Neurological:      Mental Status: She is alert and oriented to person, place, and time.      Cranial Nerves: No cranial nerve deficit.      Motor: No weakness or tremor.      Coordination: Coordination normal.      Gait: Gait normal.   Psychiatric:         Attention and Perception: Attention normal.         Mood and Affect: Mood normal.         Speech: Speech normal.         Behavior: Behavior normal.         Thought Content: Thought content normal.       HEALTH RISK ASSESSMENT    Smoking Status:  Social History     Tobacco Use   Smoking Status Never Smoker   Smokeless Tobacco Never Used     Alcohol Consumption:  Social History     Substance and Sexual Activity   Alcohol Use Never     Fall Risk Screen:  Fall Risk  Assessment was completed, and patient is at low risk for falls.    Depression Screening:  PHQ-2/PHQ-9 Depression Screening 11/18/2021   Little interest or pleasure in doing things 0   Feeling down, depressed, or hopeless 0   Trouble falling or staying asleep, or sleeping too much 0   Feeling tired or having little energy 0   Poor appetite or overeating 0   Feeling bad about yourself - or that you are a failure or have let yourself or your family down 0   Trouble concentrating on things, such as reading the newspaper or watching television 0   Moving or speaking so slowly that other people could have noticed. Or the opposite - being so fidgety or restless that you have been moving around a lot more than usual 0   Thoughts that you would be better off dead, or of hurting yourself in some way 0   Total Score 0   If you checked off any problems, how difficult have these problems made it for you to do your work, take care of things at home, or get along with other people? Not difficult at all     Health Habits and Functional and Cognitive Screening:  Functional & Cognitive Status 11/18/2021   Do you have difficulty preparing food and eating? No   Do you have difficulty bathing yourself, getting dressed or grooming yourself? No   Do you have difficulty using the toilet? No   Do you have difficulty moving around from place to place? No   Do you have trouble with steps or getting out of a bed or a chair? No   Current Diet Well Balanced Diet   Dental Exam Not up to date   Eye Exam Not up to date   Exercise (times per week) 7 times per week   Current Exercises Include Walking   Do you need help using the phone?  No   Are you deaf or do you have serious difficulty hearing?  No   Do you need help with transportation? No   Do you need help shopping? No   Do you need help preparing meals?  No   Do you need help with housework?  No   Do you need help with laundry? No   Do you need help taking your medications? No   Do you need  help managing money? No   Do you ever drive or ride in a car without wearing a seat belt? No   Have you felt unusual stress, anger or loneliness in the last month? No   Who do you live with? Spouse   If you need help, do you have trouble finding someone available to you? No   Have you been bothered in the last four weeks by sexual problems? No   Do you have difficulty concentrating, remembering or making decisions? No     Age-appropriate Screening Schedule:  Refer to the list below for future screening recommendations based on patient's age, sex and/or medical conditions. Orders for these recommended tests are listed in the plan section. The patient has been provided with a written plan.    Health Maintenance   Topic Date Due   • DXA SCAN  Never done   • LIPID PANEL  05/10/2022   • TDAP/TD VACCINES (2 - Td or Tdap) 10/27/2027   • INFLUENZA VACCINE  Completed        Assessment/Plan   CMS Preventative Services Quick Reference  Risk Factors Identified During Encounter  Chronic Pain   Depression/Dysphoria  Immunizations Discussed/Encouraged (specific Immunizations; COVID19  Obesity/Overweight   The above risks/problems have been discussed with the patient.  Follow up actions/plans if indicated are seen below in the Assessment/Plan Section.  Pertinent information has been shared with the patient in the After Visit Summary.    Diagnoses and all orders for this visit:    1. Chronic frontal sinusitis   Advise regarding symptomatic treatment  Reviewed options and agreed on a course of levofloxacin.  Advised of the potential side effects including the risk of C. difficile colitis and tendon rupture.  Encouraged to report if any worse, any new symptoms, or if not resolving over the next week as an ENT assessment will then be considered  -     levoFLOXacin (LEVAQUIN) 500 MG tablet; Take 1 tablet by mouth Daily.  Dispense: 10 tablet; Refill: 0    2. Chronic allergic rhinitis  Reminded regarding allergen avoidance.  Continue  current medication    3. Essential hypertension   Hypertension: at goal. Evidence of target organ damage: none.  Encouraged to continue to work on diet and exercise plan.     4. Mixed hyperlipidemia  As above.   Continue current medication.  Updated labs will be drawn at her return.    5. Nonsustained ventricular tachycardia (HCC)    6. Prediabetes  As above.  Will continue to monitor    7. Healthcare maintenance  Patient has already received a flu shot along with a third dose of the Moderna COVID-19 vaccine this fall  We will arrange an updated mammogram  -     Mammo Screening Digital Tomosynthesis Bilateral With CAD; Future    8. Panic attacks  Doing well at present  Encouraged to report if this should change.  Continue current medication    9. Mechanical low back pain  Reminded regarding symptomatic treatment.   Continue current medication  Follow up with pain management    10. KATALINA (obstructive sleep apnea)    Follow Up:   Return in about 3 months (around 3/1/2022) for Should be fasting at return.     An After Visit Summary and PPPS were made available to the patient.

## 2021-11-22 ENCOUNTER — OFFICE VISIT (OUTPATIENT)
Dept: FAMILY MEDICINE CLINIC | Facility: CLINIC | Age: 46
End: 2021-11-22

## 2021-11-22 VITALS
HEIGHT: 66 IN | DIASTOLIC BLOOD PRESSURE: 78 MMHG | OXYGEN SATURATION: 97 % | SYSTOLIC BLOOD PRESSURE: 124 MMHG | WEIGHT: 195 LBS | HEART RATE: 106 BPM | TEMPERATURE: 98.3 F | BODY MASS INDEX: 31.34 KG/M2

## 2021-11-22 DIAGNOSIS — S03.40XA TMJ (SPRAIN OF TEMPOROMANDIBULAR JOINT), INITIAL ENCOUNTER: Primary | ICD-10-CM

## 2021-11-22 PROCEDURE — 99213 OFFICE O/P EST LOW 20 MIN: CPT | Performed by: NURSE PRACTITIONER

## 2021-11-22 NOTE — PATIENT INSTRUCTIONS
Temporomandibular Joint Syndrome    Temporomandibular joint syndrome (TMJ syndrome) is a condition that causes pain in the temporomandibular joints. These joints are located near your ears and allow your jaw to open and close. For people with TMJ syndrome, chewing, biting, or other movements of the jaw can be difficult or painful.  TMJ syndrome is often mild and goes away within a few weeks. However, sometimes the condition becomes a long-term (chronic) problem.  What are the causes?  This condition may be caused by:  · Grinding your teeth or clenching your jaw. Some people do this when they are under stress.  · Arthritis.  · Injury to the jaw.  · Head or neck injury.  · Teeth or dentures that are not aligned well.  In some cases, the cause of TMJ syndrome may not be known.  What are the signs or symptoms?  The most common symptom of this condition is an aching pain on the side of the head in the area of the TMJ. Other symptoms may include:  · Pain when moving your jaw, such as when chewing or biting.  · Being unable to open your jaw all the way.  · Making a clicking sound when you open your mouth.  · Headache.  · Earache.  · Neck or shoulder pain.  How is this diagnosed?  This condition may be diagnosed based on:  · Your symptoms and medical history.  · A physical exam. Your health care provider may check the range of motion of your jaw.  · Imaging tests, such as X-rays or an MRI.  You may also need to see your dentist, who will determine if your teeth and jaw are lined up correctly.  How is this treated?  TMJ syndrome often goes away on its own. If treatment is needed, the options may include:  · Eating soft foods and applying ice or heat.  · Medicines to relieve pain or inflammation.  · Medicines or massage to relax the muscles.  · A splint, bite plate, or mouthpiece to prevent teeth grinding or jaw clenching.  · Relaxation techniques or counseling to help reduce stress.  · A therapy for pain in which an  electrical current is applied to the nerves through the skin (transcutaneous electrical nerve stimulation).  · Acupuncture. This is sometimes helpful to relieve pain.  · Jaw surgery. This is rarely needed.  Follow these instructions at home:    Eating and drinking  · Eat a soft diet if you are having trouble chewing.  · Avoid foods that require a lot of chewing. Do not chew gum.  General instructions  · Take over-the-counter and prescription medicines only as told by your health care provider.  · If directed, put ice on the painful area.  ? Put ice in a plastic bag.  ? Place a towel between your skin and the bag.  ? Leave the ice on for 20 minutes, 2-3 times a day.  · Apply a warm, wet cloth (warm compress) to the painful area as directed.  · Massage your jaw area and do any jaw stretching exercises as told by your health care provider.  · If you were given a splint, bite plate, or mouthpiece, wear it as told by your health care provider.  · Keep all follow-up visits as told by your health care provider. This is important.  Contact a health care provider if:  · You are having trouble eating.  · You have new or worsening symptoms.  Get help right away if:  · Your jaw locks open or closed.  Summary  · Temporomandibular joint syndrome (TMJ syndrome) is a condition that causes pain in the temporomandibular joints. These joints are located near your ears and allow your jaw to open and close.  · TMJ syndrome is often mild and goes away within a few weeks. However, sometimes the condition becomes a long-term (chronic) problem.  · Symptoms include an aching pain on the side of the head in the area of the TMJ, pain when chewing or biting, and being unable to open your jaw all the way. You may also make a clicking sound when you open your mouth.  · TMJ syndrome often goes away on its own. If treatment is needed, it may include medicines to relieve pain, reduce inflammation, or relax the muscles. A splint, bite plate, or  mouthpiece may also be used to prevent teeth grinding or jaw clenching.  This information is not intended to replace advice given to you by your health care provider. Make sure you discuss any questions you have with your health care provider.  Document Revised: 03/01/2019 Document Reviewed: 01/29/2019  [employer] Patient Education © 2021 [employer]

## 2021-11-22 NOTE — PROGRESS NOTES
"Chief Complaint  Migraine and Earache    Subjective          Iraida Heard is a 46 y.o. female who presents today to [facility] for follow up for right jaw pain.    HPI:   Facial Pain  This is a recurrent problem. The current episode started 1 to 4 weeks ago. The problem occurs constantly. The problem has been unchanged. Associated symptoms include joint swelling (right upper jaw). Pertinent negatives include no congestion, fever, headaches or swollen glands. The symptoms are aggravated by eating and coughing (yawning). Treatments tried: initially prescribed antibiotics for ear infection and congestion, has also tried steroids. The treatment provided no relief.     Patient was seen by myself, Teresa, and then Dr. Mixon, all with concerns for upper respiratory infection/congestion. Patient has been taking cefdinir which she completed and then levaquin as prescribed by Dr. Mixon. She states that nothing has relieved the pain but feels like the steroids helped a little bit. She describes the location of the pain to be right upper jaw, at the TMJ site. She denies dental grinding when she sleeps. Patient states that a couple weeks ago when pain first initially occurred, she had yawned really big, she heard and felt a loud \"pop\". She states since then that the pain and tenderness in her jaw has been present. She has since had a dental appointment and x ray imaging was obtained as part of the routine visit. She states that the dentist noted some sinus congestion/infection but made no mention of any abnormality with her teeth or abnormal jaw imaging. She states that she continues to have slight swelling of the face at the upper portion of her jaw. She denies fever.     Objective     Problem List:  Patient Active Problem List   Diagnosis   • Essential hypertension   • Nonsustained ventricular tachycardia (HCC)   • Mixed hyperlipidemia   • KATALINA (obstructive sleep apnea)   • Panic " attacks   • Hot flashes   • Healthcare maintenance   • Prediabetes   • Mechanical low back pain   • Chronic allergic rhinitis   • Chronic frontal sinusitis   • TMJ (sprain of temporomandibular joint), initial encounter       Allergy:   No Known Allergies     Discontinued Medications:  Medications Discontinued During This Encounter   Medication Reason   • levoFLOXacin (LEVAQUIN) 500 MG tablet *Therapy completed       Current Medications:   Current Outpatient Medications   Medication Sig Dispense Refill   • Calcium Polycarbophil (FIBER LAXATIVE PO) Take  by mouth.     • cetirizine (zyrTEC) 10 MG tablet Take 1 tablet by mouth Daily As Needed for Allergies. 30 tablet 5   • Cranberry 125 MG tablet Take  by mouth Daily.     • dicyclomine (BENTYL) 20 MG tablet Take  by mouth 2 (two) times a day.     • FLUoxetine (PROzac) 20 MG capsule Take 1 capsule by mouth Every Morning. 90 capsule 3   • montelukast (SINGULAIR) 10 MG tablet Take 1 tablet by mouth Daily. 90 tablet 3   • omeprazole (priLOSEC) 20 MG capsule Take 1 capsule by mouth Daily. 90 capsule 3   • Probiotic Product (Risaquad-2) capsule capsule Take 1 capsule by mouth Daily.     • rosuvastatin (CRESTOR) 20 MG tablet Take 1 tablet by mouth Daily. 90 tablet 1   • diclofenac (VOLTAREN) 50 MG EC tablet Take 1 tablet by mouth 2 (Two) Times a Day. 15 tablet 0     No current facility-administered medications for this visit.       Past Medical History:  Past Medical History:   Diagnosis Date   • Arthritis    • Hyperlipidemia    • Hypertension 2/9/2021       Past Surgical History:  Past Surgical History:   Procedure Laterality Date   • APPENDECTOMY     • CHOLECYSTECTOMY     • EXCISION BENIGN SKIN LESION SCALP / NECK / HANDS / FEET / GENITALIA     • GALLBLADDER SURGERY     • HYSTERECTOMY      GELY/RSO for benign disease   • SINUS SURGERY     • TONSILLECTOMY         Review of Systems:  Review of Systems   Constitutional: Negative for fever.   HENT: Positive for facial swelling.  "Negative for congestion.    Respiratory: Negative.    Cardiovascular: Negative.    Gastrointestinal: Negative.    Musculoskeletal: Positive for joint swelling (right upper jaw).   Skin: Negative.    Neurological: Negative for headaches.   All other systems reviewed and are negative.      Physical Exam:  Physical Exam  Vitals and nursing note reviewed.   Constitutional:       General: She is not in acute distress.     Appearance: Normal appearance. She is not ill-appearing.      Interventions: Face mask in place.   HENT:      Head: Normocephalic and atraumatic.      Jaw: Tenderness, swelling (right side) and pain on movement present.        Nose: Nose normal.      Mouth/Throat:      Mouth: Mucous membranes are moist.      Pharynx: Oropharynx is clear.   Cardiovascular:      Rate and Rhythm: Normal rate and regular rhythm.      Pulses: Normal pulses.   Pulmonary:      Effort: Pulmonary effort is normal. No respiratory distress.      Breath sounds: Normal breath sounds.   Abdominal:      Palpations: Abdomen is soft.   Musculoskeletal:         General: Normal range of motion.      Cervical back: Normal range of motion and neck supple.   Skin:     General: Skin is warm and dry.      Capillary Refill: Capillary refill takes less than 2 seconds.   Neurological:      Mental Status: She is alert and oriented to person, place, and time.   Psychiatric:         Mood and Affect: Mood normal.         Behavior: Behavior normal.         Vital Signs:   /78 (BP Location: Right arm, Patient Position: Sitting)   Pulse 106   Temp 98.3 °F (36.8 °C)   Ht 167.6 cm (66\")   Wt 88.5 kg (195 lb)   SpO2 97%   BMI 31.47 kg/m²      Lab Results:   Office Visit on 11/01/2021   Component Date Value Ref Range Status   • COVID19 11/01/2021 Not Detected  Not Detected - Ref. Range Final   • Influenza A PCR 11/01/2021 Not Detected  Not Detected Final   • Influenza B PCR 11/01/2021 Not Detected  Not Detected Final       EKG Results:  No orders " to display       Imaging Results:  No Images in the past 120 days found..              Assessment and Plan   Diagnoses and all orders for this visit:    1. TMJ (sprain of temporomandibular joint), initial encounter (Primary)  Comments:  Stop Levaquin  Start diclofenac 50 mg po bid  avoid chewing on right side of face for 1-2 weeks  Orders:  -     diclofenac (VOLTAREN) 50 MG EC tablet; Take 1 tablet by mouth 2 (Two) Times a Day.  Dispense: 15 tablet; Refill: 0        Meds ordered during this visit:  New Medications Ordered This Visit   Medications   • diclofenac (VOLTAREN) 50 MG EC tablet     Sig: Take 1 tablet by mouth 2 (Two) Times a Day.     Dispense:  15 tablet     Refill:  0       Patient Instructions:  Patient instructions given for the following visit diagnosis:    ICD-10-CM ICD-9-CM   1. TMJ (sprain of temporomandibular joint), initial encounter  S03.40XA 848.1       Follow Up   Return in about 1 month (around 12/22/2021).        This document has been electronically signed by MARK Aburto  November 22, 2021 13:25 EST    Patient was given instructions and counseling regarding her condition or for health maintenance advice. Please see specific information pulled into the AVS if appropriate.     Part of this note may be an electronic transcription/translation of spoken language to printed text using the Dragon Dictation System.

## 2021-12-13 RX ORDER — OMEPRAZOLE 20 MG/1
CAPSULE, DELAYED RELEASE ORAL
Qty: 90 CAPSULE | Refills: 3 | Status: SHIPPED | OUTPATIENT
Start: 2021-12-13 | End: 2022-12-13

## 2021-12-20 DIAGNOSIS — S03.40XA TMJ (SPRAIN OF TEMPOROMANDIBULAR JOINT), INITIAL ENCOUNTER: Primary | ICD-10-CM

## 2021-12-28 RX ORDER — DICYCLOMINE HCL 20 MG
20 TABLET ORAL 2 TIMES DAILY
Qty: 60 TABLET | Refills: 2 | Status: SHIPPED | OUTPATIENT
Start: 2021-12-28 | End: 2022-05-13 | Stop reason: SDUPTHER

## 2021-12-28 NOTE — TELEPHONE ENCOUNTER
Caller: [facility] - [place], [place] - [address] - [phone] [facility] [phone] FX    Relationship: Pharmacy    Best call back number: [phone]    Requested Prescriptions:   Requested Prescriptions     Pending Prescriptions Disp Refills   • dicyclomine (BENTYL) 20 MG tablet       Sig: Take  by mouth.        Pharmacy where request should be sent: [facility] - [facility] [address] - [phone] [facility] [phone] FX    Additional details provided by patient: OUT OF MEDICATION     Does the patient have less than a 3 day supply:  [x] Yes  [] No    Kirit Montanez Rep   12/28/21 15:29 EST

## 2022-01-24 ENCOUNTER — HOSPITAL ENCOUNTER (OUTPATIENT)
Dept: MAMMOGRAPHY | Facility: HOSPITAL | Age: 47
Discharge: HOME | End: 2022-01-24
Admitting: GENERAL PRACTICE

## 2022-01-24 DIAGNOSIS — Z00.00 HEALTHCARE MAINTENANCE: ICD-10-CM

## 2022-01-24 DIAGNOSIS — Z12.31 ENCOUNTER FOR SCREENING MAMMOGRAM FOR BREAST CANCER: ICD-10-CM

## 2022-01-24 PROCEDURE — 77067 SCR MAMMO BI INCL CAD: CPT

## 2022-01-24 PROCEDURE — 77067 SCR MAMMO BI INCL CAD: CPT | Performed by: RADIOLOGY

## 2022-01-24 PROCEDURE — 77063 BREAST TOMOSYNTHESIS BI: CPT | Performed by: RADIOLOGY

## 2022-01-24 PROCEDURE — 77063 BREAST TOMOSYNTHESIS BI: CPT

## 2022-03-03 ENCOUNTER — OFFICE VISIT (OUTPATIENT)
Dept: FAMILY MEDICINE CLINIC | Facility: CLINIC | Age: 47
End: 2022-03-03

## 2022-03-03 VITALS
OXYGEN SATURATION: 94 % | SYSTOLIC BLOOD PRESSURE: 125 MMHG | HEART RATE: 80 BPM | HEIGHT: 66 IN | DIASTOLIC BLOOD PRESSURE: 68 MMHG | BODY MASS INDEX: 31.18 KG/M2 | WEIGHT: 194 LBS | TEMPERATURE: 98.2 F | RESPIRATION RATE: 14 BRPM

## 2022-03-03 DIAGNOSIS — G47.33 OSA (OBSTRUCTIVE SLEEP APNEA): ICD-10-CM

## 2022-03-03 DIAGNOSIS — J30.9 CHRONIC ALLERGIC RHINITIS: Primary | ICD-10-CM

## 2022-03-03 DIAGNOSIS — I47.29 NONSUSTAINED VENTRICULAR TACHYCARDIA: ICD-10-CM

## 2022-03-03 DIAGNOSIS — Z00.00 HEALTHCARE MAINTENANCE: ICD-10-CM

## 2022-03-03 DIAGNOSIS — E78.2 MIXED HYPERLIPIDEMIA: ICD-10-CM

## 2022-03-03 DIAGNOSIS — I10 ESSENTIAL HYPERTENSION: ICD-10-CM

## 2022-03-03 DIAGNOSIS — M54.59 MECHANICAL LOW BACK PAIN: ICD-10-CM

## 2022-03-03 DIAGNOSIS — M26.621 ARTHRALGIA OF RIGHT TEMPOROMANDIBULAR JOINT: ICD-10-CM

## 2022-03-03 DIAGNOSIS — R23.2 HOT FLASHES: ICD-10-CM

## 2022-03-03 DIAGNOSIS — R73.03 PREDIABETES: ICD-10-CM

## 2022-03-03 DIAGNOSIS — F41.0 PANIC ATTACKS: ICD-10-CM

## 2022-03-03 PROBLEM — J32.1 CHRONIC FRONTAL SINUSITIS: Status: RESOLVED | Noted: 2021-11-18 | Resolved: 2022-03-03

## 2022-03-03 PROCEDURE — 36415 COLL VENOUS BLD VENIPUNCTURE: CPT | Performed by: GENERAL PRACTICE

## 2022-03-03 PROCEDURE — 99214 OFFICE O/P EST MOD 30 MIN: CPT | Performed by: GENERAL PRACTICE

## 2022-03-03 NOTE — PROGRESS NOTES
Subjective   Iraida Heard is a 46 y.o. female.     Chief Complaint  She returns for a scheduled reassessment of multiple medical problems including chronic allergic rhinitis, hyperlipidemia, chronic anxiety with panic attacks, and chronic low back pain    History of Present Illness     Chronic Allergic Rhinitis  She has a history of intermittent nasal congestion, postnasal drip, right ear fullness, and right lower lateral facial pressure.  She continues to deny any other upper respiratory tract symptoms and there is no history of any fever or chills. She remains on cetirizine and montelukast with no apparent side effects.  She discontinued nasal fluticasone due to irritation.  She gives a history of previous immunotherapy and has also had sinus surgery twice.  She is scheduled to undergo an ENT assessment next month    Hyperlipidemia  She has been following an appropriate diet but admits to getting less exercise over the last few years with a 10 to 15 pound weight gain.  She remains on rosuvastatin with no apparent side effects.    Panic Attacks  She admits to intermittent episodes of nervousness, worrying, flushing, and diaphoresis.  She denies persistent anxiety and there is no history of depression, loss of interest in activities, or suicide ideation.  She remains on fluoxetine    Low Back Pain  She gives a 4 to 5 year history of intermittent low back pain.  This has been worse over the last year.  There is no history of any recent strain or trauma nor any change in her activities.  The pain is described as a bilateral lower ache radiating to her right posterior thigh and calf.  The pain in her back has been worse than that in her leg.  She continues to deny any weakness, numbness, or tingling and there is been no change in her bowel/bladder control.  The pain increases with activity improves with rest.  Plain films of the lumbar spine performed on 5/19/2021 were reported as showing mild degenerative disc  disease at L4-5.  Noncontrast CT of the lumbar spine performed on 7/21/2021 was reported as showing multilevel DDD and osteoarthritis with mild to moderate central canal stenosis at L4-5.  She has seen both a physical therapist and a chiropractor in the past with little improvement in her symptoms.  She is currently followed by pain management and has undergone several injections with only a transient improvement each time    The following portions of the patient's history were reviewed and updated as appropriate: allergies, current medications, past medical history, past social history and problem list.    Review of Systems   Constitutional: Positive for fatigue. Negative for appetite change, chills, fever and unexpected weight change.   HENT: Positive for congestion, ear pain (bilateral ear fullness), postnasal drip, rhinorrhea and sinus pressure. Negative for sneezing and sore throat.    Eyes: Negative for visual disturbance.   Respiratory: Negative for cough, shortness of breath and wheezing.    Cardiovascular: Negative for chest pain, palpitations and leg swelling.   Gastrointestinal: Negative for abdominal pain, blood in stool, constipation, diarrhea, nausea and vomiting.   Endocrine:        Intermittent hot flashes   Genitourinary: Negative for difficulty urinating, dysuria, frequency, hematuria and urgency.   Musculoskeletal: Positive for back pain. Negative for arthralgias, joint swelling, myalgias and neck pain.   Skin: Negative for rash.   Neurological: Negative for weakness, numbness and headaches.   Psychiatric/Behavioral: Positive for sleep disturbance. Negative for dysphoric mood and suicidal ideas. The patient is nervous/anxious.      Objective   Physical Exam  Constitutional:       General: She is not in acute distress.     Appearance: Normal appearance. She is well-developed. She is not diaphoretic.      Comments: Accompanied by her mother.  Bright and in good spirits. No apparent distress. No  pallor, jaundice, diaphoresis, or cyanosis.   HENT:      Head: Atraumatic.      Jaw: Tenderness (mild right TMJ tenderness) present.      Right Ear: Tympanic membrane, ear canal and external ear normal.      Left Ear: Tympanic membrane, ear canal and external ear normal.      Mouth/Throat:      Mouth: Mucous membranes are moist.      Pharynx: No posterior oropharyngeal erythema.   Eyes:      Conjunctiva/sclera: Conjunctivae normal.   Neck:      Thyroid: No thyroid mass or thyromegaly.      Vascular: No carotid bruit or JVD.      Trachea: Trachea normal. No tracheal deviation.   Cardiovascular:      Rate and Rhythm: Normal rate and regular rhythm.      Heart sounds: Normal heart sounds, S1 normal and S2 normal. No murmur heard.  No gallop.    Pulmonary:      Effort: Pulmonary effort is normal.      Breath sounds: Normal breath sounds.   Chest:   Breasts:      Right: No supraclavicular adenopathy.      Left: No supraclavicular adenopathy.       Abdominal:      General: Bowel sounds are normal. There is no distension.   Musculoskeletal:      Lumbar back: Negative right straight leg raise test and negative left straight leg raise test.      Right lower leg: No edema.      Left lower leg: No edema.   Lymphadenopathy:      Head:      Right side of head: No submental, submandibular, tonsillar, preauricular, posterior auricular or occipital adenopathy.      Left side of head: No submental, submandibular, tonsillar, preauricular, posterior auricular or occipital adenopathy.      Cervical: No cervical adenopathy.      Upper Body:      Right upper body: No supraclavicular adenopathy.      Left upper body: No supraclavicular adenopathy.   Skin:     General: Skin is warm.      Coloration: Skin is not cyanotic, jaundiced or pale.      Findings: No rash.      Nails: There is no clubbing.   Neurological:      Mental Status: She is alert and oriented to person, place, and time.      Cranial Nerves: No cranial nerve deficit.       Motor: No weakness or tremor.      Coordination: Coordination normal.      Gait: Gait normal.   Psychiatric:         Attention and Perception: Attention normal.         Mood and Affect: Mood normal.         Speech: Speech normal.         Behavior: Behavior normal.         Thought Content: Thought content normal.       Assessment/Plan   Problems Addressed this Visit        Allergies and Adverse Reactions    Chronic allergic rhinitis   Reminded regarding allergen avoidance.  Continue current medication  Follow up with ENT       Cardiac and Vasculature    Essential hypertension   Hypertension: remains at goal off medication. Evidence of target organ damage: none.  Encouraged to continue to work on diet and exercise plan.   Will continue to monitor    Relevant Orders    CBC & Differential    Comprehensive Metabolic Panel    Mixed hyperlipidemia  As above.  Updated labs drawn.    Relevant Orders    Comprehensive Metabolic Panel    Lipid Panel    TSH    Nonsustained ventricular tachycardia (HCC)    Relevant Orders    TSH       ENT    Arthralgia of right temporomandibular joint  Advised to avoid hard foods, and repetitive chewing  Patient has already tried a guard  She is uninterested in PT but will consider  Follow up with ENT       Endocrine and Metabolic    Prediabetes  As above.  Will continue to monitor    Relevant Orders    Hemoglobin A1c       Health Encounters    Healthcare maintenance  Patient has already received a flu shot along with a third dose of the Moderna COVID-19 vaccine       Mental Health    Panic attacks  Stable.  Supportive therapy.   Continue current medication.       Musculoskeletal and Injuries    Mechanical low back pain  Reminded regarding symptomatic treatment.   Encouraged to report if any worse or if any new symptoms or concerns.       Sleep    KATALINA (obstructive sleep apnea)       Symptoms and Signs    Hot flashes  Encouraged to report if any worse or if any new symptoms or concerns.     Relevant Orders    FSH & LH      Diagnoses       Codes Comments    Chronic allergic rhinitis    -  Primary ICD-10-CM: J30.9  ICD-9-CM: 477.9     Essential hypertension     ICD-10-CM: I10  ICD-9-CM: 401.9     Mixed hyperlipidemia     ICD-10-CM: E78.2  ICD-9-CM: 272.2     Nonsustained ventricular tachycardia (HCC)     ICD-10-CM: I47.2  ICD-9-CM: 427.1     Prediabetes     ICD-10-CM: R73.03  ICD-9-CM: 790.29     Healthcare maintenance     ICD-10-CM: Z00.00  ICD-9-CM: V70.0     Panic attacks     ICD-10-CM: F41.0  ICD-9-CM: 300.01     Mechanical low back pain     ICD-10-CM: M54.59  ICD-9-CM: 724.2     KATALINA (obstructive sleep apnea)     ICD-10-CM: G47.33  ICD-9-CM: 327.23     Hot flashes     ICD-10-CM: R23.2  ICD-9-CM: 782.62     Arthralgia of right temporomandibular joint     ICD-10-CM: M26.621  ICD-9-CM: 524.62

## 2022-03-04 LAB
ALBUMIN SERPL-MCNC: 4.6 G/DL (ref 3.5–5.2)
ALBUMIN/GLOB SERPL: 1.8 G/DL
ALP SERPL-CCNC: 71 U/L (ref 39–117)
ALT SERPL-CCNC: 51 U/L (ref 1–33)
AST SERPL-CCNC: 30 U/L (ref 1–32)
BASOPHILS # BLD AUTO: 0.04 10*3/MM3 (ref 0–0.2)
BASOPHILS NFR BLD AUTO: 0.4 % (ref 0–1.5)
BILIRUB SERPL-MCNC: 0.5 MG/DL (ref 0–1.2)
BUN SERPL-MCNC: 15 MG/DL (ref 6–20)
BUN/CREAT SERPL: 15 (ref 7–25)
CALCIUM SERPL-MCNC: 9.3 MG/DL (ref 8.6–10.5)
CHLORIDE SERPL-SCNC: 104 MMOL/L (ref 98–107)
CHOLEST SERPL-MCNC: 199 MG/DL (ref 0–200)
CO2 SERPL-SCNC: 26.7 MMOL/L (ref 22–29)
CREAT SERPL-MCNC: 1 MG/DL (ref 0.57–1)
EGFR GENE MUT ANL BLD/T: 70.9 ML/MIN/1.73
EOSINOPHIL # BLD AUTO: 0.06 10*3/MM3 (ref 0–0.4)
EOSINOPHIL NFR BLD AUTO: 0.6 % (ref 0.3–6.2)
ERYTHROCYTE [DISTWIDTH] IN BLOOD BY AUTOMATED COUNT: 12.7 % (ref 12.3–15.4)
FSH SERPL-ACNC: 7.8 MIU/ML
GLOBULIN SER CALC-MCNC: 2.6 GM/DL
GLUCOSE SERPL-MCNC: 116 MG/DL (ref 65–99)
HBA1C MFR BLD: 7.1 % (ref 4.8–5.6)
HCT VFR BLD AUTO: 45.3 % (ref 34–46.6)
HDLC SERPL-MCNC: 60 MG/DL (ref 40–60)
HGB BLD-MCNC: 15.4 G/DL (ref 12–15.9)
IMM GRANULOCYTES # BLD AUTO: 0.03 10*3/MM3 (ref 0–0.05)
IMM GRANULOCYTES NFR BLD AUTO: 0.3 % (ref 0–0.5)
LDLC SERPL CALC-MCNC: 121 MG/DL (ref 0–100)
LH SERPL-ACNC: 6.9 MIU/ML
LYMPHOCYTES # BLD AUTO: 2.5 10*3/MM3 (ref 0.7–3.1)
LYMPHOCYTES NFR BLD AUTO: 25.5 % (ref 19.6–45.3)
MCH RBC QN AUTO: 33.2 PG (ref 26.6–33)
MCHC RBC AUTO-ENTMCNC: 34 G/DL (ref 31.5–35.7)
MCV RBC AUTO: 97.6 FL (ref 79–97)
MONOCYTES # BLD AUTO: 1.1 10*3/MM3 (ref 0.1–0.9)
MONOCYTES NFR BLD AUTO: 11.2 % (ref 5–12)
NEUTROPHILS # BLD AUTO: 6.09 10*3/MM3 (ref 1.7–7)
NEUTROPHILS NFR BLD AUTO: 62 % (ref 42.7–76)
NRBC BLD AUTO-RTO: 0 /100 WBC (ref 0–0.2)
PLATELET # BLD AUTO: 321 10*3/MM3 (ref 140–450)
POTASSIUM SERPL-SCNC: 5.5 MMOL/L (ref 3.5–5.2)
PROT SERPL-MCNC: 7.2 G/DL (ref 6–8.5)
RBC # BLD AUTO: 4.64 10*6/MM3 (ref 3.77–5.28)
SODIUM SERPL-SCNC: 141 MMOL/L (ref 136–145)
TRIGL SERPL-MCNC: 99 MG/DL (ref 0–150)
TSH SERPL DL<=0.005 MIU/L-ACNC: 1.93 UIU/ML (ref 0.27–4.2)
VLDLC SERPL CALC-MCNC: 18 MG/DL (ref 5–40)
WBC # BLD AUTO: 9.82 10*3/MM3 (ref 3.4–10.8)

## 2022-03-08 ENCOUNTER — OFFICE VISIT (OUTPATIENT)
Dept: FAMILY MEDICINE CLINIC | Facility: CLINIC | Age: 47
End: 2022-03-08

## 2022-03-08 VITALS
DIASTOLIC BLOOD PRESSURE: 76 MMHG | WEIGHT: 196 LBS | HEART RATE: 87 BPM | TEMPERATURE: 97.1 F | HEIGHT: 66 IN | SYSTOLIC BLOOD PRESSURE: 108 MMHG | BODY MASS INDEX: 31.5 KG/M2 | OXYGEN SATURATION: 99 %

## 2022-03-08 DIAGNOSIS — E78.2 MIXED DYSLIPIDEMIA: Chronic | ICD-10-CM

## 2022-03-08 DIAGNOSIS — E11.65 TYPE 2 DIABETES MELLITUS WITH HYPERGLYCEMIA, WITHOUT LONG-TERM CURRENT USE OF INSULIN: Primary | Chronic | ICD-10-CM

## 2022-03-08 DIAGNOSIS — I10 ESSENTIAL HYPERTENSION: Chronic | ICD-10-CM

## 2022-03-08 PROCEDURE — 99214 OFFICE O/P EST MOD 30 MIN: CPT | Performed by: NURSE PRACTITIONER

## 2022-03-08 RX ORDER — LANCETS 28 GAUGE
1 EACH MISCELLANEOUS DAILY
Qty: 100 EACH | Refills: 3 | Status: SHIPPED | OUTPATIENT
Start: 2022-03-08

## 2022-03-08 NOTE — PROGRESS NOTES
History of Present Illness  Iraida Heard is a 46 y.o. female who presents to the clinic today pertaining to her newly diagnosed Diabetes, type 2. In addition, she has Hypertension and Dyslipidemia. She is accompanied by her mother who also has diabetes.     Diabetes  She presents for her initial diabetic visit. She has type 2 diabetes mellitus. Her disease course has been stable. There are no hypoglycemic associated symptoms. There are no diabetic complications. Risk factors for coronary artery disease include dyslipidemia, diabetes mellitus and hypertension.   Lab Results   Component Value Date    HGBA1C 7.10 (H) 03/03/2022     Dyslipidemia  The current episode started more than 1 year ago. Current antihyperlipidemic treatment includes statins. Risk factors for coronary artery disease include diabetes mellitus, dyslipidemia and hypertension.   Lab Results   Component Value Date    CHLPL 199 03/03/2022    CHLPL 142 05/10/2021    CHLPL 148 05/03/2021     Lab Results   Component Value Date    TRIG 99 03/03/2022    TRIG 158 (H) 05/10/2021    TRIG 143 05/03/2021     Lab Results   Component Value Date    HDL 60 03/03/2022    HDL 43 05/03/2021     Lab Results   Component Value Date     (H) 03/03/2022    LDL 80 05/03/2021     Hypertension  The problem is controlled. Pertinent negatives include no palpitations. Risk factors for coronary artery disease include diabetes mellitus and dyslipidemia. Current antihypertension treatment includes lifestyle changes.   Lab Results   Component Value Date    GLUCOSE 116 (H) 03/03/2022    BUN 15 03/03/2022    CREATININE 1.00 03/03/2022    EGFRIFNONA 76 05/03/2021    EGFRIFNONA 75 05/03/2021    EGFRIFAFRI 93 05/03/2021    EGFRIFAFRI 91 05/03/2021    BCR 15.0 03/03/2022    K 5.5 (H) 03/03/2022    CO2 26.7 03/03/2022    CALCIUM 9.3 03/03/2022    PROTENTOTREF 7.2 03/03/2022    ALBUMIN 4.60 03/03/2022    LABIL2 1.8 03/03/2022    AST 30 03/03/2022    ALT 51 (H) 03/03/2022     The  "following portions of the patient's history were reviewed and updated as appropriate: allergies, current medications, past family history, past medical history, past social history, past surgical history and problem list.    Review of Systems   Constitutional: Positive for fatigue. Negative for activity change, appetite change, chills, fever and unexpected weight change.   Eyes: Negative for visual disturbance.   Respiratory: Negative for cough and wheezing.    Cardiovascular: Negative for leg swelling.   Gastrointestinal: Negative for nausea and vomiting.   Endocrine: Negative for cold intolerance, heat intolerance, polydipsia, polyphagia and polyuria.   Musculoskeletal: Positive for arthralgias.   Skin: Negative for color change and rash.   Allergic/Immunologic: Positive for environmental allergies.   Neurological: Negative for light-headedness.   Hematological: Negative for adenopathy.   Psychiatric/Behavioral: Positive for sleep disturbance. Negative for decreased concentration. The patient is nervous/anxious.    All other systems reviewed and are negative.    Vital signs:  /76 (BP Location: Right arm, Patient Position: Sitting, Cuff Size: Adult)   Pulse 87   Temp 97.1 °F (36.2 °C) (Temporal)   Ht 167.6 cm (65.98\")   Wt 88.9 kg (196 lb)   SpO2 99%   BMI 31.65 kg/m²     Physical Exam  Vitals and nursing note reviewed.   Constitutional:       General: She is not in acute distress.     Appearance: She is well-developed.      Interventions: Face mask in place.   HENT:      Head: Normocephalic.      Nose: Nose normal.   Eyes:      General: No scleral icterus.        Right eye: No discharge.         Left eye: No discharge.      Conjunctiva/sclera: Conjunctivae normal.   Neck:      Thyroid: No thyromegaly.      Vascular: No JVD.   Cardiovascular:      Rate and Rhythm: Normal rate and regular rhythm.      Heart sounds: Normal heart sounds. No murmur heard.    No friction rub.   Pulmonary:      Effort: " Pulmonary effort is normal. No respiratory distress.      Breath sounds: Normal breath sounds. No wheezing or rales.   Abdominal:      Palpations: Abdomen is soft.      Tenderness: There is no abdominal tenderness. There is no guarding.   Musculoskeletal:      Cervical back: Neck supple.      Right lower leg: No edema.      Left lower leg: No edema.   Lymphadenopathy:      Cervical: No cervical adenopathy.   Skin:     General: Skin is warm and dry.      Capillary Refill: Capillary refill takes less than 2 seconds.      Findings: No erythema or rash.   Neurological:      Mental Status: She is alert and oriented to person, place, and time.   Psychiatric:         Behavior: Behavior normal.         Thought Content: Thought content normal.         Judgment: Judgment normal.       Result Review :   Results for orders placed or performed in visit on 03/03/22   Comprehensive Metabolic Panel    Specimen: Arm, Right; Blood    Blood  Manual Differen   Result Value Ref Range    Glucose 116 (H) 65 - 99 mg/dL    BUN 15 6 - 20 mg/dL    Creatinine 1.00 0.57 - 1.00 mg/dL    EGFR Result 70.9 >60.0 mL/min/1.73    BUN/Creatinine Ratio 15.0 7.0 - 25.0    Sodium 141 136 - 145 mmol/L    Potassium 5.5 (H) 3.5 - 5.2 mmol/L    Chloride 104 98 - 107 mmol/L    Total CO2 26.7 22.0 - 29.0 mmol/L    Calcium 9.3 8.6 - 10.5 mg/dL    Total Protein 7.2 6.0 - 8.5 g/dL    Albumin 4.60 3.50 - 5.20 g/dL    Globulin 2.6 gm/dL    A/G Ratio 1.8 g/dL    Total Bilirubin 0.5 0.0 - 1.2 mg/dL    Alkaline Phosphatase 71 39 - 117 U/L    AST (SGOT) 30 1 - 32 U/L    ALT (SGPT) 51 (H) 1 - 33 U/L   Lipid Panel    Specimen: Arm, Right; Blood    Blood  Manual Differen   Result Value Ref Range    Total Cholesterol 199 0 - 200 mg/dL    Triglycerides 99 0 - 150 mg/dL    HDL Cholesterol 60 40 - 60 mg/dL    VLDL Cholesterol Florin 18 5 - 40 mg/dL    LDL Chol Calc (NIH) 121 (H) 0 - 100 mg/dL   TSH    Specimen: Arm, Right; Blood    Blood  Manual Differen   Result Value Ref Range     TSH 1.930 0.270 - 4.200 uIU/mL   Hemoglobin A1c    Specimen: Arm, Right; Blood    Blood  Manual Differen   Result Value Ref Range    Hemoglobin A1C 7.10 (H) 4.80 - 5.60 %   FSH & LH    Specimen: Arm, Right; Blood    Blood  Manual Differen   Result Value Ref Range    LH 6.9 mIU/mL    FSH 7.8 mIU/mL   CBC & Differential    Specimen: Arm, Right; Blood    Blood  Manual Differen   Result Value Ref Range    WBC 9.82 3.40 - 10.80 10*3/mm3    RBC 4.64 3.77 - 5.28 10*6/mm3    Hemoglobin 15.4 12.0 - 15.9 g/dL    Hematocrit 45.3 34.0 - 46.6 %    MCV 97.6 (H) 79.0 - 97.0 fL    MCH 33.2 (H) 26.6 - 33.0 pg    MCHC 34.0 31.5 - 35.7 g/dL    RDW 12.7 12.3 - 15.4 %    Platelets 321 140 - 450 10*3/mm3    Neutrophil Rel % 62.0 42.7 - 76.0 %    Lymphocyte Rel % 25.5 19.6 - 45.3 %    Monocyte Rel % 11.2 5.0 - 12.0 %    Eosinophil Rel % 0.6 0.3 - 6.2 %    Basophil Rel % 0.4 0.0 - 1.5 %    Neutrophils Absolute 6.09 1.70 - 7.00 10*3/mm3    Lymphocytes Absolute 2.50 0.70 - 3.10 10*3/mm3    Monocytes Absolute 1.10 (H) 0.10 - 0.90 10*3/mm3    Eosinophils Absolute 0.06 0.00 - 0.40 10*3/mm3    Basophils Absolute 0.04 0.00 - 0.20 10*3/mm3    Immature Granulocyte Rel % 0.3 0.0 - 0.5 %    Immature Grans Absolute 0.03 0.00 - 0.05 10*3/mm3    nRBC 0.0 0.0 - 0.2 /100 WBC     Patient's Body mass index is 31.65 kg/m². indicating that she is obese (BMI >30). Obesity-related health conditions include the following: hypertension, diabetes mellitus and dyslipidemias. Obesity is unchanged. BMI  is above average; BMI management plan is completed. Provided information on  portion control and increasing exercise..     Assessment/Plan     Diagnoses and all orders for this visit:    1. Type 2 diabetes mellitus with hyperglycemia, without long-term current use of insulin (HCC) (Primary)  Comments:  Counseled regarding diabetes self management skills   Orders:  -     Blood Glucose Monitoring Suppl kit; 1 Device Daily.  Dispense: 1 each; Refill: 0  -     glucose  blood test strip; 1 each by Other route Daily. Use as instructed  Dispense: 100 each; Refill: 3  -     Lancets Ultra Fine misc; 1 Device Daily.  Dispense: 100 each; Refill: 3  -     Dulaglutide (Trulicity) 0.75 MG/0.5ML solution pen-injector; Inject 0.75 mg under the skin into the appropriate area as directed 1 (One) Time Per Week.  Dispense: 4 pen; Refill: 0    2. Mixed dyslipidemia  Comments:  Continue Rosuvastatin     3. Essential hypertension  Comments:  Well controlled with cardiovascular risk reduction modifications     Follow Up In four weeks   Findings and recommendations discussed with Iraida. Reviewed above test results.     Education Documentation  Diabetes Self Management Skills reviewed with her including nutrition, exercise and cardiovascular risk reduction modification  recommendations.  She will begin monitoring her blood sugars daily at various times. Targets reviewed including fasting and postprandial targets. Discussed a goal of 30 Gr of CHO per meal for weight loss.    Education Comments  She is receptive to starting a GLP 1 which samples of Trulicity for one month provided.    Iraida was given instructions and counseling regarding her condition or for health maintenance advice. Please see specific information pulled into the AVS if appropriate  She will follow up in four weeks sooner if problems/concerns occur.    This document has been electronically signed by:

## 2022-03-10 RX ORDER — DULAGLUTIDE 0.75 MG/.5ML
0.75 INJECTION, SOLUTION SUBCUTANEOUS WEEKLY
Qty: 4 PEN | Refills: 0 | COMMUNITY
Start: 2022-03-10 | End: 2022-03-29 | Stop reason: SDUPTHER

## 2022-03-13 DIAGNOSIS — E78.2 MIXED HYPERLIPIDEMIA: ICD-10-CM

## 2022-03-13 DIAGNOSIS — S03.40XA TMJ (SPRAIN OF TEMPOROMANDIBULAR JOINT), INITIAL ENCOUNTER: ICD-10-CM

## 2022-03-14 RX ORDER — ROSUVASTATIN CALCIUM 20 MG/1
20 TABLET, COATED ORAL DAILY
Qty: 90 TABLET | Refills: 1 | Status: SHIPPED | OUTPATIENT
Start: 2022-03-14 | End: 2022-06-06 | Stop reason: SDUPTHER

## 2022-03-29 ENCOUNTER — OFFICE VISIT (OUTPATIENT)
Dept: FAMILY MEDICINE CLINIC | Facility: CLINIC | Age: 47
End: 2022-03-29

## 2022-03-29 VITALS
BODY MASS INDEX: 31.18 KG/M2 | WEIGHT: 194 LBS | HEIGHT: 66 IN | HEART RATE: 78 BPM | SYSTOLIC BLOOD PRESSURE: 120 MMHG | OXYGEN SATURATION: 99 % | TEMPERATURE: 97.1 F | DIASTOLIC BLOOD PRESSURE: 84 MMHG

## 2022-03-29 DIAGNOSIS — E11.65 TYPE 2 DIABETES MELLITUS WITH HYPERGLYCEMIA, WITHOUT LONG-TERM CURRENT USE OF INSULIN: Primary | Chronic | ICD-10-CM

## 2022-03-29 DIAGNOSIS — I10 ESSENTIAL HYPERTENSION: Chronic | ICD-10-CM

## 2022-03-29 DIAGNOSIS — E78.2 MIXED DYSLIPIDEMIA: Chronic | ICD-10-CM

## 2022-03-29 PROBLEM — E11.9 TYPE 2 DIABETES MELLITUS, WITHOUT LONG-TERM CURRENT USE OF INSULIN (HCC): Status: ACTIVE | Noted: 2022-03-29

## 2022-03-29 PROCEDURE — 99214 OFFICE O/P EST MOD 30 MIN: CPT | Performed by: NURSE PRACTITIONER

## 2022-03-29 RX ORDER — DULAGLUTIDE 0.75 MG/.5ML
0.75 INJECTION, SOLUTION SUBCUTANEOUS WEEKLY
Qty: 4 PEN | Refills: 0 | COMMUNITY
Start: 2022-03-29 | End: 2022-06-06

## 2022-03-29 RX ORDER — DULAGLUTIDE 1.5 MG/.5ML
1.5 INJECTION, SOLUTION SUBCUTANEOUS WEEKLY
Qty: 4 PEN | Refills: 0 | COMMUNITY
Start: 2022-03-29 | End: 2022-06-16

## 2022-03-29 NOTE — PROGRESS NOTES
History of Present Illness  Iraida Heard is a 47 y.o. female who presents to the clinic today pertaining to her newly diagnosed Diabetes, type 2. In addition, she has Hypertension and Dyslipidemia. She is accompanied by her mother who also has diabetes.     Diabetes  She has type 2 diabetes mellitus. Her disease course has been stable. There are no hypoglycemic associated symptoms. There are no diabetic complications. Risk factors for coronary artery disease include dyslipidemia, diabetes mellitus and hypertension.   Lab Results   Component Value Date    HGBA1C 7.10 (H) 03/03/2022     Dyslipidemia  The current episode started more than 1 year ago. Current antihyperlipidemic treatment includes statins. Risk factors for coronary artery disease include diabetes mellitus, dyslipidemia and hypertension.   Lab Results   Component Value Date    CHLPL 199 03/03/2022    CHLPL 142 05/10/2021    CHLPL 148 05/03/2021     Lab Results   Component Value Date    TRIG 99 03/03/2022    TRIG 158 (H) 05/10/2021    TRIG 143 05/03/2021     Lab Results   Component Value Date    HDL 60 03/03/2022    HDL 43 05/03/2021     Lab Results   Component Value Date     (H) 03/03/2022    LDL 80 05/03/2021     Hypertension  The problem is controlled. Pertinent negatives include no palpitations. Risk factors for coronary artery disease include diabetes mellitus and dyslipidemia. Current antihypertension treatment includes lifestyle changes.   Lab Results   Component Value Date    GLUCOSE 116 (H) 03/03/2022    BUN 15 03/03/2022    CREATININE 1.00 03/03/2022    BCR 15.0 03/03/2022    K 5.5 (H) 03/03/2022    CO2 26.7 03/03/2022    CALCIUM 9.3 03/03/2022    PROTENTOTREF 7.2 03/03/2022    ALBUMIN 4.60 03/03/2022    LABIL2 1.8 03/03/2022    AST 30 03/03/2022    ALT 51 (H) 03/03/2022     The following portions of the patient's history were reviewed and updated as appropriate: allergies, current medications, past family history, past medical  "history, past social history, past surgical history and problem list.    Review of Systems   Constitutional: Positive for fatigue. Negative for activity change, appetite change, chills, fever and unexpected weight change.   Eyes: Negative for visual disturbance.   Respiratory: Negative for cough and wheezing.    Cardiovascular: Negative for leg swelling.   Gastrointestinal: Negative for nausea and vomiting.   Endocrine: Negative for cold intolerance, heat intolerance, polydipsia, polyphagia and polyuria.   Musculoskeletal: Positive for arthralgias.   Skin: Negative for color change and rash.   Allergic/Immunologic: Positive for environmental allergies.   Neurological: Negative for light-headedness.   Hematological: Negative for adenopathy.   Psychiatric/Behavioral: Positive for sleep disturbance. Negative for decreased concentration.   All other systems reviewed and are negative.        Vital signs:  /84 (BP Location: Right arm, Patient Position: Sitting, Cuff Size: Adult)   Pulse 78   Temp 97.1 °F (36.2 °C) (Temporal)   Ht 167.6 cm (65.98\")   Wt 88 kg (194 lb)   SpO2 99%   BMI 31.33 kg/m²     Physical Exam  Vitals and nursing note reviewed.   Constitutional:       General: She is not in acute distress.     Appearance: She is well-developed.      Interventions: Face mask in place.      Comments: Pleasant; in good spirits.   HENT:      Head: Normocephalic.      Nose: Nose normal.   Eyes:      General: No scleral icterus.        Right eye: No discharge.         Left eye: No discharge.      Conjunctiva/sclera: Conjunctivae normal.   Neck:      Thyroid: No thyromegaly.      Vascular: No JVD.   Cardiovascular:      Rate and Rhythm: Normal rate and regular rhythm.      Heart sounds: Normal heart sounds. No murmur heard.    No friction rub.   Pulmonary:      Effort: Pulmonary effort is normal. No respiratory distress.      Breath sounds: Normal breath sounds. No wheezing or rales.   Abdominal:      Palpations: " Abdomen is soft.      Tenderness: There is no abdominal tenderness. There is no guarding.   Musculoskeletal:      Cervical back: Neck supple.      Right lower leg: No edema.      Left lower leg: No edema.   Lymphadenopathy:      Cervical: No cervical adenopathy.   Skin:     General: Skin is warm and dry.      Capillary Refill: Capillary refill takes less than 2 seconds.      Findings: No erythema or rash.   Neurological:      Mental Status: She is alert and oriented to person, place, and time.   Psychiatric:         Behavior: Behavior normal.         Thought Content: Thought content normal.         Judgment: Judgment normal.       Result Review :Blood glucose monitor shows 7 day average: 114 mg/dL                                                                           14 day average: 113 mg/dL                                                                           30 day average: 110 mg/dL  Patient's Body mass index is 31.33 kg/m². indicating that she is obese (BMI >30). Obesity-related health conditions include the following: obstructive sleep apnea, diabetes mellitus and dyslipidemias. Obesity is improving with lifestyle modifications. BMI is above average; BMI management plan is completed. We discussed portion control and increasing exercise..     Assessment/Plan     Diagnoses and all orders for this visit:    1. Type 2 diabetes mellitus with hyperglycemia, without long-term current use of insulin (HCC) (Primary)  Comments:  Reinforced diabetes self management skills. Will continue Trulicity 0.75 mg weekly for four weeks, then increase to 1.5 mg weekly Samples provided      2. Essential hypertension  Comments:  Adequate control. Continue cardiovascular risk reduction modifications including nutrition and exercise recommendations    3. Mixed dyslipidemia  Comments:  Tolerating Rosuvastatin whch she will continue       Follow Up With me as needed Will f/u with Dr Mixon in June    Findings and recommendations  discussed with Iraida. Reviewed monitor results. Lifestyle modifications reinforced including nutrition and activity recommendations. She will follow up with Dr Mixon in June sooner if problems/concerns occur.  Iraida was given instructions and counseling regarding his condition or for health maintenance advice. Please see specific information pulled into the AVS if appropriate      This document has been electronically signed by:

## 2022-04-09 DIAGNOSIS — F41.0 PANIC ATTACKS: ICD-10-CM

## 2022-04-11 RX ORDER — FLUOXETINE HYDROCHLORIDE 20 MG/1
CAPSULE ORAL
Qty: 90 CAPSULE | Refills: 3 | Status: SHIPPED | OUTPATIENT
Start: 2022-04-11 | End: 2023-01-16 | Stop reason: SDUPTHER

## 2022-04-13 ENCOUNTER — TRANSCRIBE ORDERS (OUTPATIENT)
Dept: ADMINISTRATIVE | Facility: HOSPITAL | Age: 47
End: 2022-04-13

## 2022-04-13 ENCOUNTER — LAB (OUTPATIENT)
Dept: LAB | Facility: HOSPITAL | Age: 47
End: 2022-04-13

## 2022-04-13 DIAGNOSIS — J01.00 ACUTE MAXILLARY SINUSITIS, RECURRENCE NOT SPECIFIED: Primary | ICD-10-CM

## 2022-04-13 DIAGNOSIS — J01.00 ACUTE MAXILLARY SINUSITIS, RECURRENCE NOT SPECIFIED: ICD-10-CM

## 2022-04-13 PROCEDURE — 87205 SMEAR GRAM STAIN: CPT

## 2022-04-13 PROCEDURE — 87147 CULTURE TYPE IMMUNOLOGIC: CPT

## 2022-04-13 PROCEDURE — 87070 CULTURE OTHR SPECIMN AEROBIC: CPT

## 2022-04-13 PROCEDURE — 87186 SC STD MICRODIL/AGAR DIL: CPT

## 2022-04-15 LAB
BACTERIA SPEC AEROBE CULT: ABNORMAL
BACTERIA SPEC AEROBE CULT: ABNORMAL
GRAM STN SPEC: ABNORMAL
GRAM STN SPEC: ABNORMAL

## 2022-04-18 ENCOUNTER — TRANSCRIBE ORDERS (OUTPATIENT)
Dept: ADMINISTRATIVE | Facility: HOSPITAL | Age: 47
End: 2022-04-18

## 2022-04-18 DIAGNOSIS — J32.4 CHRONIC PANSINUSITIS: Primary | ICD-10-CM

## 2022-05-10 DIAGNOSIS — E11.65 TYPE 2 DIABETES MELLITUS WITH HYPERGLYCEMIA, WITHOUT LONG-TERM CURRENT USE OF INSULIN: Chronic | ICD-10-CM

## 2022-05-13 RX ORDER — DICYCLOMINE HCL 20 MG
20 TABLET ORAL 2 TIMES DAILY
Qty: 180 TABLET | Refills: 3 | Status: SHIPPED | OUTPATIENT
Start: 2022-05-13 | End: 2023-01-16 | Stop reason: SDUPTHER

## 2022-05-19 ENCOUNTER — HOSPITAL ENCOUNTER (OUTPATIENT)
Dept: CT IMAGING | Facility: HOSPITAL | Age: 47
Discharge: HOME | End: 2022-05-19
Admitting: NURSE PRACTITIONER

## 2022-05-19 DIAGNOSIS — J32.4 CHRONIC PANSINUSITIS: ICD-10-CM

## 2022-05-19 PROCEDURE — 70486 CT MAXILLOFACIAL W/O DYE: CPT

## 2022-05-19 PROCEDURE — 70486 CT MAXILLOFACIAL W/O DYE: CPT | Performed by: RADIOLOGY

## 2022-05-20 ENCOUNTER — CLINICAL SUPPORT (OUTPATIENT)
Dept: FAMILY MEDICINE CLINIC | Facility: CLINIC | Age: 47
End: 2022-05-20

## 2022-05-20 DIAGNOSIS — G43.809 OTHER MIGRAINE WITHOUT STATUS MIGRAINOSUS, NOT INTRACTABLE: Primary | ICD-10-CM

## 2022-05-20 PROCEDURE — 96372 THER/PROPH/DIAG INJ SC/IM: CPT | Performed by: NURSE PRACTITIONER

## 2022-05-20 RX ORDER — KETOROLAC TROMETHAMINE 30 MG/ML
30 INJECTION, SOLUTION INTRAMUSCULAR; INTRAVENOUS EVERY 6 HOURS PRN
Status: SHIPPED | OUTPATIENT
Start: 2022-05-20 | End: 2022-05-25

## 2022-05-20 RX ADMIN — KETOROLAC TROMETHAMINE 30 MG: 30 INJECTION, SOLUTION INTRAMUSCULAR; INTRAVENOUS at 15:00

## 2022-05-25 RX ORDER — FEXOFENADINE HCL AND PSEUDOEPHEDRINE HCI 180; 240 MG/1; MG/1
1 TABLET, EXTENDED RELEASE ORAL DAILY PRN
Qty: 30 TABLET | Refills: 0 | Status: SHIPPED | OUTPATIENT
Start: 2022-05-25 | End: 2023-01-16

## 2022-05-26 ENCOUNTER — APPOINTMENT (OUTPATIENT)
Dept: CT IMAGING | Facility: HOSPITAL | Age: 47
End: 2022-05-26

## 2022-05-26 ENCOUNTER — TELEPHONE (OUTPATIENT)
Dept: FAMILY MEDICINE CLINIC | Facility: CLINIC | Age: 47
End: 2022-05-26

## 2022-05-26 NOTE — TELEPHONE ENCOUNTER
Caller: Iraida Heard    Relationship to patient: Self    Best call back number: [phone]     What is the call regarding:  PATIENT STATES THAT SHE MESSAGED DR PATEL YESTERDAY ABOUT FILL A PRESCRIPTION FOR PSEUDOPHEDRINE AND ANOTHER PRESCRIPTION, WHICH SHE CANNOT REMEMBER THE NAME, BUT DOESN'T REMEMBER THE NAME.  SHE IS WANTING TO KNOW IF THEY HAVE BEEN SENT.  HER PHARMACY IS [facility]

## 2022-06-06 ENCOUNTER — OFFICE VISIT (OUTPATIENT)
Dept: FAMILY MEDICINE CLINIC | Facility: CLINIC | Age: 47
End: 2022-06-06

## 2022-06-06 VITALS
HEIGHT: 66 IN | DIASTOLIC BLOOD PRESSURE: 64 MMHG | SYSTOLIC BLOOD PRESSURE: 124 MMHG | RESPIRATION RATE: 14 BRPM | BODY MASS INDEX: 30.37 KG/M2 | TEMPERATURE: 97.7 F | HEART RATE: 96 BPM | WEIGHT: 189 LBS | OXYGEN SATURATION: 97 %

## 2022-06-06 DIAGNOSIS — G47.33 OSA (OBSTRUCTIVE SLEEP APNEA): ICD-10-CM

## 2022-06-06 DIAGNOSIS — I10 ESSENTIAL HYPERTENSION: ICD-10-CM

## 2022-06-06 DIAGNOSIS — Z00.00 HEALTHCARE MAINTENANCE: ICD-10-CM

## 2022-06-06 DIAGNOSIS — I47.29 NONSUSTAINED VENTRICULAR TACHYCARDIA: ICD-10-CM

## 2022-06-06 DIAGNOSIS — J30.9 CHRONIC ALLERGIC RHINITIS: Primary | ICD-10-CM

## 2022-06-06 DIAGNOSIS — E11.9 TYPE 2 DIABETES MELLITUS WITHOUT COMPLICATION, WITHOUT LONG-TERM CURRENT USE OF INSULIN: ICD-10-CM

## 2022-06-06 DIAGNOSIS — E78.2 MIXED HYPERLIPIDEMIA: ICD-10-CM

## 2022-06-06 DIAGNOSIS — M54.59 MECHANICAL LOW BACK PAIN: ICD-10-CM

## 2022-06-06 DIAGNOSIS — E11.65 TYPE 2 DIABETES MELLITUS WITH HYPERGLYCEMIA, WITHOUT LONG-TERM CURRENT USE OF INSULIN: Chronic | ICD-10-CM

## 2022-06-06 DIAGNOSIS — F41.0 PANIC ATTACKS: ICD-10-CM

## 2022-06-06 PROBLEM — M26.621 ARTHRALGIA OF RIGHT TEMPOROMANDIBULAR JOINT: Status: RESOLVED | Noted: 2022-03-03 | Resolved: 2022-06-06

## 2022-06-06 PROBLEM — S03.40XA TMJ (SPRAIN OF TEMPOROMANDIBULAR JOINT), INITIAL ENCOUNTER: Status: RESOLVED | Noted: 2021-11-22 | Resolved: 2022-06-06

## 2022-06-06 PROCEDURE — 99214 OFFICE O/P EST MOD 30 MIN: CPT | Performed by: GENERAL PRACTICE

## 2022-06-06 RX ORDER — ROSUVASTATIN CALCIUM 20 MG/1
20 TABLET, COATED ORAL DAILY
Qty: 90 TABLET | Refills: 1 | Status: SHIPPED | OUTPATIENT
Start: 2022-06-06 | End: 2022-12-17 | Stop reason: SDUPTHER

## 2022-06-06 NOTE — PROGRESS NOTES
Subjective   Iraida Heard is a 47 y.o. female.     Chief Complaint  She returns for a scheduled reassessment of multiple medical problems including newly diagnosed type 2 diabetes mellitus, hyperlipidemia, chronic allergic rhinitis, and chronic anxiety    History of Present Illness     Diabetes  She has been following an appropriate diet and pursuing regular exercise.  She is currently on dulaglutide 1.5 mg weekly and apart from mild nausea for a day or two after each injection denies any side effects.  Lab Results   Component Value Date    HGBA1C 7.10 (H) 03/03/2022      Hyperlipidemia  She remains on rosuvastatin with no apparent side effects.  Lab Results   Component Value Date    CHLPL 199 03/03/2022    TRIG 99 03/03/2022    HDL 60 03/03/2022     (H) 03/03/2022     Chronic Allergic Rhinitis  She gives a long history of intermittent nasal congestion and postnasal drip.  She denies any other upper respiratory tract symptoms and there is no history of any fever or chills. She remains on fexofenadine, pseudoephedrine, and montelukast with no apparent side effects.  She discontinued nasal fluticasone due to irritation.  She gives a history of previous immunotherapy and has also had sinus surgery twice.  She is currently followed by ENT    Panic Attacks  She admits to intermittent episodes of nervousness, worrying, flushing, and diaphoresis.  She denies persistent anxiety and there is no history of depression, loss of interest in activities, or suicide ideation.  She remains on fluoxetine  Lab Results   Component Value Date    TSH 1.930 03/03/2022     Low Back Pain  She gives a 4 to 5 year history of intermittent low back pain.  This has been worse over the last year.  There is no history of any recent strain or trauma nor any change in her activities.  The pain is described as a bilateral lower ache radiating to her right posterior thigh and calf.  The pain in her back has been worse than that in her leg.   She continues to deny any weakness, numbness, or tingling and there is been no change in her bowel/bladder control.  The pain increases with activity improves with rest.  Plain films of the lumbar spine performed on 5/19/2021 were reported as showing mild degenerative disc disease at L4-5.  Noncontrast CT of the lumbar spine performed on 7/21/2021 was reported as showing multilevel DDD and osteoarthritis with mild to moderate central canal stenosis at L4-5.  She has seen both a physical therapist and a chiropractor in the past with little improvement in her symptoms.  She is currently followed by pain management and has undergone several injections with only a transient improvement each time    Labs  Lab Results   Component Value Date    WBC 9.82 03/03/2022    HGB 15.4 03/03/2022    HCT 45.3 03/03/2022    MCV 97.6 (H) 03/03/2022     03/03/2022     The following portions of the patient's history were reviewed and updated as appropriate: allergies, current medications, past medical history, past social history and problem list.    Review of Systems   Constitutional: Positive for fatigue. Negative for appetite change, chills, fever and unexpected weight change.   HENT: Positive for congestion and rhinorrhea. Negative for ear pain, postnasal drip, sinus pressure, sneezing and sore throat.    Eyes: Negative for visual disturbance.   Respiratory: Negative for cough, shortness of breath and wheezing.    Cardiovascular: Negative for chest pain, palpitations and leg swelling.   Gastrointestinal: Negative for abdominal pain, blood in stool, constipation, diarrhea, nausea and vomiting.   Endocrine:        Intermittent hot flashes   Genitourinary: Negative for difficulty urinating, dysuria, frequency, hematuria and urgency.   Musculoskeletal: Positive for back pain. Negative for arthralgias, joint swelling, myalgias and neck pain.   Skin: Negative for rash.   Neurological: Negative for weakness, numbness and headaches.    Psychiatric/Behavioral: Positive for sleep disturbance. Negative for dysphoric mood and suicidal ideas. The patient is nervous/anxious.      Objective   Physical Exam  Constitutional:       General: She is not in acute distress.     Appearance: Normal appearance. She is well-developed. She is not diaphoretic.      Comments: Accompanied by her mother.  Bright and in good spirits. No apparent distress. No pallor, jaundice, diaphoresis, or cyanosis.   HENT:      Head: Atraumatic.      Right Ear: Tympanic membrane, ear canal and external ear normal.      Left Ear: Tympanic membrane, ear canal and external ear normal.      Mouth/Throat:      Mouth: Mucous membranes are moist.      Pharynx: No posterior oropharyngeal erythema.   Eyes:      Conjunctiva/sclera: Conjunctivae normal.   Neck:      Thyroid: No thyroid mass or thyromegaly.      Vascular: No carotid bruit or JVD.      Trachea: Trachea normal. No tracheal deviation.   Cardiovascular:      Rate and Rhythm: Normal rate and regular rhythm.      Heart sounds: Normal heart sounds, S1 normal and S2 normal. No murmur heard.    No gallop.   Pulmonary:      Effort: Pulmonary effort is normal.      Breath sounds: Normal breath sounds.   Chest:   Breasts:      Right: No supraclavicular adenopathy.      Left: No supraclavicular adenopathy.       Abdominal:      General: Bowel sounds are normal. There is no distension.   Musculoskeletal:      Lumbar back: Negative right straight leg raise test and negative left straight leg raise test.      Right lower leg: No edema.      Left lower leg: No edema.   Lymphadenopathy:      Head:      Right side of head: No submental, submandibular, tonsillar, preauricular, posterior auricular or occipital adenopathy.      Left side of head: No submental, submandibular, tonsillar, preauricular, posterior auricular or occipital adenopathy.      Cervical: No cervical adenopathy.      Upper Body:      Right upper body: No supraclavicular  adenopathy.      Left upper body: No supraclavicular adenopathy.   Skin:     General: Skin is warm.      Coloration: Skin is not cyanotic, jaundiced or pale.      Findings: No rash.      Nails: There is no clubbing.   Neurological:      Mental Status: She is alert and oriented to person, place, and time.      Cranial Nerves: No cranial nerve deficit.      Motor: No weakness or tremor.      Coordination: Coordination normal.      Gait: Gait normal.   Psychiatric:         Attention and Perception: Attention normal.         Mood and Affect: Mood normal.         Speech: Speech normal.         Behavior: Behavior normal.         Thought Content: Thought content normal.       Assessment & Plan   Problems Addressed this Visit        Allergies and Adverse Reactions    Chronic allergic rhinitis   Reminded regarding allergen avoidance.  Continue current medication  Follow up with ENT       Cardiac and Vasculature    Essential hypertension   Hypertension: BP remains at goal off medication. Evidence of target organ damage: none.  Encouraged to continue to work on diet and exercise plan.   Addition of an ACE or ARB will be considered if her blood pressure should climb or if she should develop microalbuminuria    Relevant Orders    CBC & Differential    Comprehensive Metabolic Panel    Mixed hyperlipidemia  As above.  Rosuvastatin will be titrated if necessary to achieve an LDL of less than 70    Relevant Medications    rosuvastatin (CRESTOR) 20 MG tablet    Other Relevant Orders    Comprehensive Metabolic Panel    Lipid Panel    Nonsustained ventricular tachycardia (HCC)       Endocrine and Metabolic    Type 2 diabetes mellitus, without long-term current use of insulin (HCC)  Recently diagnosed  Encouraged to continue to work on her diet and exercise plan  Metformin will be added  Continue dulaglutide  Scheduled for updated labs just prior to her return    Relevant Medications    metFORMIN (GLUCOPHAGE) 500 MG tablet    Other  Relevant Orders    Hemoglobin A1c    MicroAlbumin, Urine, Random - Urine, Clean Catch       Health Encounters    Healthcare maintenance  We will discuss hepatitis B vaccination at her return       Mental Health    Panic attacks  Stable.  Supportive therapy.   Continue current medication.       Musculoskeletal and Injuries    Mechanical low back pain  Reminded regarding symptomatic treatment.   Continue current medication  Follow up with pain management       Sleep    KATALINA (obstructive sleep apnea)      Diagnoses       Codes Comments    Chronic allergic rhinitis    -  Primary ICD-10-CM: J30.9  ICD-9-CM: 477.9     Essential hypertension     ICD-10-CM: I10  ICD-9-CM: 401.9     Mixed hyperlipidemia     ICD-10-CM: E78.2  ICD-9-CM: 272.2     Nonsustained ventricular tachycardia (HCC)     ICD-10-CM: I47.2  ICD-9-CM: 427.1     Type 2 diabetes mellitus without complication, without long-term current use of insulin (HCC)     ICD-10-CM: E11.9  ICD-9-CM: 250.00     Healthcare maintenance     ICD-10-CM: Z00.00  ICD-9-CM: V70.0     Panic attacks     ICD-10-CM: F41.0  ICD-9-CM: 300.01     Mechanical low back pain     ICD-10-CM: M54.59  ICD-9-CM: 724.2     KATALINA (obstructive sleep apnea)     ICD-10-CM: G47.33  ICD-9-CM: 327.23     Type 2 diabetes mellitus with hyperglycemia, without long-term current use of insulin (HCC)     ICD-10-CM: E11.65  ICD-9-CM: 250.00, 790.29 Reinforced diabetes self management skills. Will continue Trulicity 0.75 mg weekly for four weeks, then increase to 1.5 mg weekly Samples provided

## 2022-06-16 DIAGNOSIS — E11.9 TYPE 2 DIABETES MELLITUS WITHOUT COMPLICATION, WITHOUT LONG-TERM CURRENT USE OF INSULIN: Primary | ICD-10-CM

## 2022-06-16 RX ORDER — SEMAGLUTIDE 1.34 MG/ML
1 INJECTION, SOLUTION SUBCUTANEOUS WEEKLY
Qty: 6 PEN | Refills: 3 | Status: SHIPPED | OUTPATIENT
Start: 2022-06-16 | End: 2022-09-15

## 2022-07-18 ENCOUNTER — TELEPHONE (OUTPATIENT)
Dept: FAMILY MEDICINE CLINIC | Facility: CLINIC | Age: 47
End: 2022-07-18

## 2022-07-18 ENCOUNTER — CLINICAL SUPPORT (OUTPATIENT)
Dept: FAMILY MEDICINE CLINIC | Facility: CLINIC | Age: 47
End: 2022-07-18

## 2022-07-18 DIAGNOSIS — M54.59 MECHANICAL LOW BACK PAIN: Primary | ICD-10-CM

## 2022-07-18 PROCEDURE — 96372 THER/PROPH/DIAG INJ SC/IM: CPT | Performed by: GENERAL PRACTICE

## 2022-07-18 RX ORDER — KETOROLAC TROMETHAMINE 30 MG/ML
30 INJECTION, SOLUTION INTRAMUSCULAR; INTRAVENOUS ONCE
Status: COMPLETED | OUTPATIENT
Start: 2022-07-18 | End: 2022-07-18

## 2022-07-18 RX ORDER — KETOROLAC TROMETHAMINE 30 MG/ML
30 INJECTION, SOLUTION INTRAMUSCULAR; INTRAVENOUS EVERY 6 HOURS PRN
Status: DISCONTINUED | OUTPATIENT
Start: 2022-07-18 | End: 2022-07-18

## 2022-07-18 RX ADMIN — KETOROLAC TROMETHAMINE 30 MG: 30 INJECTION, SOLUTION INTRAMUSCULAR; INTRAVENOUS at 15:47

## 2022-07-18 NOTE — TELEPHONE ENCOUNTER
Pt is aware of this information.       ----- Message from Amari Mixon MD sent at 7/18/2022  9:22 AM EDT -----  Sure    ----- Message -----  From: Richa Paige MA  Sent: 7/18/2022   8:59 AM EDT  To: Amari Mixon MD    Pt called and wanted to know if you would okay a Toradol shot for back pain. She is coming in with her  at 2:30.

## 2022-08-08 ENCOUNTER — OFFICE VISIT (OUTPATIENT)
Dept: FAMILY MEDICINE CLINIC | Facility: CLINIC | Age: 47
End: 2022-08-08

## 2022-08-08 VITALS
SYSTOLIC BLOOD PRESSURE: 118 MMHG | TEMPERATURE: 97.4 F | OXYGEN SATURATION: 98 % | HEIGHT: 66 IN | WEIGHT: 186 LBS | DIASTOLIC BLOOD PRESSURE: 88 MMHG | BODY MASS INDEX: 29.89 KG/M2 | HEART RATE: 94 BPM

## 2022-08-08 DIAGNOSIS — M51.36 DDD (DEGENERATIVE DISC DISEASE), LUMBAR: Primary | ICD-10-CM

## 2022-08-08 DIAGNOSIS — M51.26 LUMBAR HERNIATED DISC: ICD-10-CM

## 2022-08-08 DIAGNOSIS — M53.3 SACROILIAC JOINT DYSFUNCTION OF RIGHT SIDE: ICD-10-CM

## 2022-08-08 DIAGNOSIS — M48.061 FORAMINAL STENOSIS OF LUMBAR REGION: ICD-10-CM

## 2022-08-08 PROCEDURE — 96372 THER/PROPH/DIAG INJ SC/IM: CPT | Performed by: PHYSICIAN ASSISTANT

## 2022-08-08 PROCEDURE — 99214 OFFICE O/P EST MOD 30 MIN: CPT | Performed by: PHYSICIAN ASSISTANT

## 2022-08-08 RX ORDER — ACETAMINOPHEN 500 MG
500 TABLET ORAL EVERY 8 HOURS PRN
COMMUNITY

## 2022-08-08 RX ORDER — KETOROLAC TROMETHAMINE 30 MG/ML
30 INJECTION, SOLUTION INTRAMUSCULAR; INTRAVENOUS ONCE
Status: COMPLETED | OUTPATIENT
Start: 2022-08-08 | End: 2022-08-08

## 2022-08-08 RX ADMIN — KETOROLAC TROMETHAMINE 30 MG: 30 INJECTION, SOLUTION INTRAMUSCULAR; INTRAVENOUS at 09:19

## 2022-08-08 NOTE — PROGRESS NOTES
Injection  Injection performed in right dorsogluteal by Natasha Roberts MA. Patient tolerated the procedure well without complications.  08/08/22   Natasha Roberts MA

## 2022-08-08 NOTE — PROGRESS NOTES
Subjective        Chief Complaint  Back Pain and Hip Pain    Subjective      History of Present Illness  Iraida Heard is a 47 y.o. female who presents today to [facility] for acute complaints. . Past medical history is significant for type 2 diabetes mellitus, dyslipidemia, chronic allergic rhinitis, and chronic anxiety.  She presents the office today with complaints of low back and right hip pain.    Low back pain with known DDD of the lumbar spine:  Right hip pain:  Patient reports a long history of low back pain and intermittent hip pain.  She has followed with physical therapy in the past without any improvement.  She follows with pain management for IM pain therapy and feels like after the last 2 visits, she has not had much improvement.  She denies any recent falls or injuries.  No previous history of spinal or other skeletal surgeries.  She uses lidocaine patches and Salonpas at home.  She no longer takes the diclofenac.  She takes as needed Tylenol.  She denies any saddle anesthesia or peripheral paresthesias. She is requesting repeat imaging as it has been >1-year since her last.  She had a CT scan of the lumbar spine without contrast July 2021 which showed multilevel DDD L3/4 through L5/S1 with stenosis noted at the L4/5 level.  Chronic appearing disc protrusion prominent disc osteophyte complex.  Multilevel facet arthropathy.  No acute fracture or traumatic malalignment.      Current Outpatient Medications:   •  acetaminophen (TYLENOL) 500 MG tablet, Take 500 mg by mouth Every 8 (Eight) Hours As Needed., Disp: , Rfl:   •  Blood Glucose Monitoring Suppl kit, 1 Device Daily., Disp: 1 each, Rfl: 0  •  Calcium Polycarbophil (FIBER LAXATIVE PO), Take  by mouth., Disp: , Rfl:   •  Cranberry 125 MG tablet, Take  by mouth Daily., Disp: , Rfl:   •  dicyclomine (BENTYL) 20 MG tablet, Take 1 tablet by mouth 2 (Two) Times a Day., Disp: 180 tablet, Rfl: 3  •   "fexofenadine-pseudoephedrine (ALLEGRA-D 24) 180-240 MG per 24 hr tablet, Take 1 tablet by mouth Daily As Needed for Allergies., Disp: 30 tablet, Rfl: 0  •  FLUoxetine (PROzac) 20 MG capsule, TAKE 1 CAPSULE EVERY MORNING, Disp: 90 capsule, Rfl: 3  •  glucose blood test strip, 1 each by Other route Daily. Use as instructed, Disp: 100 each, Rfl: 3  •  Lancets Ultra Fine misc, 1 Device Daily., Disp: 100 each, Rfl: 3  •  metFORMIN (GLUCOPHAGE) 500 MG tablet, Take 1 tablet by mouth Daily., Disp: 90 tablet, Rfl: 3  •  montelukast (SINGULAIR) 10 MG tablet, Take 1 tablet by mouth Daily., Disp: 90 tablet, Rfl: 3  •  omeprazole (priLOSEC) 20 MG capsule, TAKE 1 CAPSULE EVERY DAY, Disp: 90 capsule, Rfl: 3  •  rosuvastatin (CRESTOR) 20 MG tablet, Take 1 tablet by mouth Daily., Disp: 90 tablet, Rfl: 1  •  Semaglutide, 1 MG/DOSE, (Ozempic, 1 MG/DOSE,) 2 MG/1.5ML solution pen-injector, Inject 1 mg under the skin into the appropriate area as directed 1 (One) Time Per Week., Disp: 6 pen, Rfl: 3  No current facility-administered medications for this visit.      Objective     Objective   Vital Signs:  Blood Pressure 118/88 (BP Location: Right arm, Patient Position: Sitting, Cuff Size: Adult)   Pulse 94   Temperature 97.4 °F (36.3 °C) (Temporal)   Height 167.6 cm (65.98\")   Weight 84.4 kg (186 lb)   Oxygen Saturation 98%   Body Mass Index 30.04 kg/m²   Estimated body mass index is 30.04 kg/m² as calculated from the following:    Height as of this encounter: 167.6 cm (65.98\").    Weight as of this encounter: 84.4 kg (186 lb).    BMI is >= 30 and <35. (Class 1 Obesity). The following options were offered after discussion;: exercise counseling/recommendations    Past Medical History:   Diagnosis Date   • Arthritis    • Hyperlipidemia    • Hypertension 2/9/2021   • Type 2 diabetes mellitus, without long-term current use of insulin (HCC) 3/29/2022     Past Surgical History:   Procedure Laterality Date   • APPENDECTOMY     • " CHOLECYSTECTOMY     • EXCISION BENIGN SKIN LESION SCALP / NECK / HANDS / FEET / GENITALIA     • GALLBLADDER SURGERY     • HYSTERECTOMY  2015    GELY/RSO for benign disease   • SINUS SURGERY     • TONSILLECTOMY       Social History     Socioeconomic History   • Marital status:    Tobacco Use   • Smoking status: Never Smoker   • Smokeless tobacco: Never Used   Vaping Use   • Vaping Use: Never used   Substance and Sexual Activity   • Alcohol use: Never   • Drug use: Never   • Sexual activity: Defer      Physical Exam  Vitals reviewed.   Constitutional:       General: She is not in acute distress.     Appearance: Normal appearance. She is not ill-appearing.   HENT:      Head: Normocephalic and atraumatic.   Cardiovascular:      Rate and Rhythm: Normal rate and regular rhythm.      Comments: Intact pedal pulses bilaterally.   Pulmonary:      Effort: Pulmonary effort is normal. No respiratory distress.      Breath sounds: No wheezing.   Musculoskeletal:      Right lower leg: No edema.      Left lower leg: No edema.      Comments: Mild tenderness to palpation of the lumbar spine and near the SI joint on the right. Normal ROM bilateral lower extremities. Bilateral lower extremities are neurovascularly intact.    Skin:     General: Skin is dry.   Neurological:      General: No focal deficit present.      Mental Status: She is alert and oriented to person, place, and time. Mental status is at baseline.      Sensory: No sensory deficit.   Psychiatric:         Mood and Affect: Mood normal.         Behavior: Behavior normal.         Thought Content: Thought content normal.         Judgment: Judgment normal.       Result Review :  The following data was reviewed by: ZITA Sanderson on 08/08/2022:    Data reviewed: Radiologic studies Prior CT L-spine.          Assessment / Plan         Assessment   Diagnoses and all orders for this visit:    1. DDD (degenerative disc disease), lumbar (Primary)  2. Sacroiliac joint  dysfunction of right side  Patient no longer follows with physical therapy as she did not want feel like she had any improvement.  Recommend continuing following with pain management regular basis.  Continue lidocaine patches and OTC anti-inflammatory/Tylenol in moderation.   Apply heat PRN as tolerated.   We will give an IM dose of ketorolac 30 mg in the office today.  We will repeat imaging with MRI of the lumbar spine to evaluate for any worsening DDD.  Patient prefers an open MRI.  We will attempt to arrange this if possible.  -     ketorolac (TORADOL) injection 30 mg  -     MRI lumbar spine wo contrast; Future       New Medications Ordered This Visit   Medications   • ketorolac (TORADOL) injection 30 mg     Health Maintenance  Recommend annual follow-up with ophthalmology for diabetic eye exam.    I spent 34 minutes caring for Iraida on this date of service. This time includes time spent by me in the following activities:preparing for the visit, reviewing tests, obtaining and/or reviewing a separately obtained history, performing a medically appropriate examination and/or evaluation , ordering medications, tests, or procedures and documenting information in the medical record    Follow Up   Keep scheduled follow-up with Dr. Cantu on 9/15/2022.    Patient was given instructions and counseling regarding her condition or for health maintenance advice. Please see specific information pulled into the AVS if appropriate.       This document has been electronically signed by ZITA Sanderson   August 8, 2022 09:23 EDT    Dictated Utilizing Dragon Dictation: Part of this note may be an electronic transcription/translation of spoken language to printed text using the Dragon Dictation System.

## 2022-08-12 RX ORDER — DIAZEPAM 5 MG/1
TABLET ORAL
Qty: 1 TABLET | Refills: 0 | Status: SHIPPED | OUTPATIENT
Start: 2022-08-12 | End: 2023-01-16

## 2022-08-19 ENCOUNTER — TELEPHONE (OUTPATIENT)
Dept: FAMILY MEDICINE CLINIC | Facility: CLINIC | Age: 47
End: 2022-08-19

## 2022-08-19 ENCOUNTER — CLINICAL SUPPORT (OUTPATIENT)
Dept: FAMILY MEDICINE CLINIC | Facility: CLINIC | Age: 47
End: 2022-08-19

## 2022-08-19 DIAGNOSIS — M51.36 DDD (DEGENERATIVE DISC DISEASE), LUMBAR: ICD-10-CM

## 2022-08-19 DIAGNOSIS — M25.559 HIP PAIN: Primary | ICD-10-CM

## 2022-08-19 PROCEDURE — 96372 THER/PROPH/DIAG INJ SC/IM: CPT | Performed by: GENERAL PRACTICE

## 2022-08-19 RX ORDER — KETOROLAC TROMETHAMINE 30 MG/ML
60 INJECTION, SOLUTION INTRAMUSCULAR; INTRAVENOUS ONCE
Status: COMPLETED | OUTPATIENT
Start: 2022-08-19 | End: 2022-08-19

## 2022-08-19 RX ADMIN — KETOROLAC TROMETHAMINE 60 MG: 30 INJECTION, SOLUTION INTRAMUSCULAR; INTRAVENOUS at 12:48

## 2022-08-19 NOTE — TELEPHONE ENCOUNTER
Pt is aware of this information.       ----- Message from Amari Mixon MD sent at 8/19/2022 11:13 AM EDT -----  sure  ----- Message -----  From: Richa Paige MA  Sent: 8/19/2022  10:11 AM EDT  To: Amari Mixon MD    Patient called and wanted to know if she could come in for a Toradol shot due to her back pain.

## 2022-08-23 ENCOUNTER — TELEPHONE (OUTPATIENT)
Dept: FAMILY MEDICINE CLINIC | Facility: CLINIC | Age: 47
End: 2022-08-23

## 2022-08-23 DIAGNOSIS — J30.9 CHRONIC ALLERGIC RHINITIS: ICD-10-CM

## 2022-08-23 NOTE — TELEPHONE ENCOUNTER
Caller: Iraida Heard    Relationship: Self    Best call back number: [phone]    Caller requesting test results: YES    What test was performed: MRI    When was the test performed: 08/18/22    Where was the test performed: [facility] [place]    Additional notes:

## 2022-08-24 RX ORDER — MONTELUKAST SODIUM 10 MG/1
TABLET ORAL
Qty: 90 TABLET | Refills: 3 | Status: SHIPPED | OUTPATIENT
Start: 2022-08-24 | End: 2023-01-16 | Stop reason: SDUPTHER

## 2022-08-29 ENCOUNTER — OFFICE VISIT (OUTPATIENT)
Dept: FAMILY MEDICINE CLINIC | Facility: CLINIC | Age: 47
End: 2022-08-29

## 2022-08-29 VITALS
BODY MASS INDEX: 28.61 KG/M2 | OXYGEN SATURATION: 96 % | SYSTOLIC BLOOD PRESSURE: 120 MMHG | TEMPERATURE: 97.8 F | HEART RATE: 82 BPM | HEIGHT: 66 IN | WEIGHT: 178 LBS | DIASTOLIC BLOOD PRESSURE: 80 MMHG

## 2022-08-29 DIAGNOSIS — R11.2 NAUSEA AND VOMITING, UNSPECIFIED VOMITING TYPE: ICD-10-CM
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
## 2022-08-29 DIAGNOSIS — S60.819A ABRASION, WRIST W/O INFECTION: ICD-10-CM

## 2022-08-29 DIAGNOSIS — M25.531 RIGHT WRIST PAIN: ICD-10-CM

## 2022-08-29 PROCEDURE — 99213 OFFICE O/P EST LOW 20 MIN: CPT | Performed by: PHYSICIAN ASSISTANT

## 2022-08-29 PROCEDURE — 96372 THER/PROPH/DIAG INJ SC/IM: CPT | Performed by: PHYSICIAN ASSISTANT

## 2022-08-29 RX ORDER — ONDANSETRON 4 MG/1
4 TABLET, FILM COATED ORAL EVERY 8 HOURS PRN
Qty: 30 TABLET | Refills: 1 | Status: SHIPPED | OUTPATIENT
Start: 2022-08-29 | End: 2023-01-16

## 2022-08-29 RX ORDER — KETOROLAC TROMETHAMINE 30 MG/ML
60 INJECTION, SOLUTION INTRAMUSCULAR; INTRAVENOUS ONCE
Status: COMPLETED | OUTPATIENT
Start: 2022-08-29 | End: 2022-08-29

## 2022-08-29 RX ADMIN — KETOROLAC TROMETHAMINE 60 MG: 30 INJECTION, SOLUTION INTRAMUSCULAR; INTRAVENOUS at 10:24

## 2022-08-29 NOTE — PROGRESS NOTES
Injection  Injection performed in left dorsogluteal by Natasha Roberts MA. Patient tolerated the procedure well without complications.  08/29/22   Natasha Roberts MA

## 2022-08-29 NOTE — PROGRESS NOTES
Subjective        Chief Complaint  Fall and Wrist Pain    Subjective      History of Present Illness  Iraida Heard is a 47 y.o. female who presents today to [facility] for complaints of wrist pain and fall. Past medical history is significant for type 2 diabetes mellitus, dyslipidemia, chronic allergic rhinitis, and chronic anxiety.    Right wrist pain:   Mechanical fall:   She [place] a [place] She was walking to get on the boat and tripped and fell. She denies hitting her head. She tried to catch herself and ended up injuring her right wrist. She has a scrape on her anterior right wrist and a small scratch at the base on the right 2nd digit from her ring. She has been putting triple antibiotic ointment on it. She has had swelling, bruising and pain with movement at the wrist.     Nausea:  She reports having nausea with few episodes of vomiting since her injury yesterday.  Again, denies hitting her head.  She was not feeling ill before her fall.  Denies any abdominal pain.  No changes in bowel movements.  No associated chest pains or discomfort.  No dyspnea.  When her pain in her wrist is worse, that is when she gets nauseous.    Low back pain with known DDD of the lumbar spine:  Right hip pain:  Pain is overall stable after her fall.  Recent MRI of the lumbar spine showed L4-5 endplate reactive changes.  Desiccated 2 mm rightward protrusion type herniation superimposed on broad bulge contributes to moderate right side foraminal stenosis.  The patient was referred to Dr. Mohan the orthopedic surgery.       Current Outpatient Medications:   •  acetaminophen (TYLENOL) 500 MG tablet, Take 500 mg by mouth Every 8 (Eight) Hours As Needed., Disp: , Rfl:   •  Blood Glucose Monitoring Suppl kit, 1 Device Daily., Disp: 1 each, Rfl: 0  •  Calcium Polycarbophil (FIBER LAXATIVE PO), Take  by mouth., Disp: , Rfl:   •  Cranberry 125 MG  "tablet, Take  by mouth Daily., Disp: , Rfl:   •  diazePAM (VALIUM) 5 MG tablet, 1 tablet 1 hour before schedule procedure, Disp: 1 tablet, Rfl: 0  •  dicyclomine (BENTYL) 20 MG tablet, Take 1 tablet by mouth 2 (Two) Times a Day., Disp: 180 tablet, Rfl: 3  •  fexofenadine-pseudoephedrine (ALLEGRA-D 24) 180-240 MG per 24 hr tablet, Take 1 tablet by mouth Daily As Needed for Allergies., Disp: 30 tablet, Rfl: 0  •  FLUoxetine (PROzac) 20 MG capsule, TAKE 1 CAPSULE EVERY MORNING, Disp: 90 capsule, Rfl: 3  •  glucose blood test strip, 1 each by Other route Daily. Use as instructed, Disp: 100 each, Rfl: 3  •  Lancets Ultra Fine misc, 1 Device Daily., Disp: 100 each, Rfl: 3  •  metFORMIN (GLUCOPHAGE) 500 MG tablet, Take 1 tablet by mouth Daily., Disp: 90 tablet, Rfl: 3  •  montelukast (SINGULAIR) 10 MG tablet, TAKE 1 TABLET EVERY DAY, Disp: 90 tablet, Rfl: 3  •  omeprazole (priLOSEC) 20 MG capsule, TAKE 1 CAPSULE EVERY DAY, Disp: 90 capsule, Rfl: 3  •  rosuvastatin (CRESTOR) 20 MG tablet, Take 1 tablet by mouth Daily., Disp: 90 tablet, Rfl: 1  •  Semaglutide, 1 MG/DOSE, (Ozempic, 1 MG/DOSE,) 2 MG/1.5ML solution pen-injector, Inject 1 mg under the skin into the appropriate area as directed 1 (One) Time Per Week., Disp: 6 pen, Rfl: 3  •  ondansetron (ZOFRAN) 4 MG tablet, Take 1 tablet by mouth Every 8 (Eight) Hours As Needed for Nausea or Vomiting., Disp: 30 tablet, Rfl: 1  No current facility-administered medications for this visit.      No Known Allergies    Objective     Objective   Vital Signs:  Blood Pressure 120/80 (BP Location: Left arm, Patient Position: Sitting, Cuff Size: Adult)   Pulse 82   Temperature 97.8 °F (36.6 °C) (Temporal)   Height 167.6 cm (65.98\")   Weight 80.7 kg (178 lb)   Oxygen Saturation 96%   Body Mass Index 28.74 kg/m²   Estimated body mass index is 28.74 kg/m² as calculated from the following:    Height as of this encounter: 167.6 cm (65.98\").    Weight as of this encounter: 80.7 kg (178 " lb).    BMI is >= 25 and <30. (Overweight) The following options were offered after discussion;: weight loss educational material (shared in after visit summary) and exercise counseling/recommendations    Past Medical History:   Diagnosis Date   • Arthritis    • Hyperlipidemia    • Hypertension 2/9/2021   • Type 2 diabetes mellitus, without long-term current use of insulin (HCC) 3/29/2022     Past Surgical History:   Procedure Laterality Date   • APPENDECTOMY     • CHOLECYSTECTOMY     • EXCISION BENIGN SKIN LESION SCALP / NECK / HANDS / FEET / GENITALIA     • GALLBLADDER SURGERY     • HYSTERECTOMY  2015    GELY/RSO for benign disease   • SINUS SURGERY     • TONSILLECTOMY       Social History     Socioeconomic History   • Marital status:    Tobacco Use   • Smoking status: Never Smoker   • Smokeless tobacco: Never Used   Vaping Use   • Vaping Use: Never used   Substance and Sexual Activity   • Alcohol use: Never   • Drug use: Never   • Sexual activity: Defer      Physical Exam  Vitals and nursing note reviewed.   Constitutional:       General: She is not in acute distress.     Appearance: She is well-developed. She is not diaphoretic.   HENT:      Head: Normocephalic and atraumatic.   Eyes:      General: No scleral icterus.        Right eye: No discharge.         Left eye: No discharge.      Conjunctiva/sclera: Conjunctivae normal.   Neck:      Vascular: No carotid bruit or JVD.   Cardiovascular:      Rate and Rhythm: Normal rate and regular rhythm.      Heart sounds: Normal heart sounds. No murmur heard.    No friction rub. No gallop.   Pulmonary:      Effort: Pulmonary effort is normal. No respiratory distress.      Breath sounds: Normal breath sounds. No wheezing or rales.   Chest:      Chest wall: No tenderness.   Abdominal:      General: Bowel sounds are normal. There is no distension.      Palpations: Abdomen is soft. There is no mass.      Tenderness: There is no abdominal tenderness. There is no guarding  or rebound.      Hernia: No hernia is present.   Musculoskeletal:         General: Normal range of motion.      Cervical back: Normal range of motion and neck supple.      Comments: Right wrist has decreased ROM 2/2 pain. There is an area of ecchymosis/edema about 3in proximal from the wrist on the medial side. Radial/ulnar pulses are intact.    Skin:     General: Skin is warm and dry.      Coloration: Skin is not pale.      Findings: No erythema or rash.      Comments: Abrasion anterior right wrist towards the lateral aspect. Probably 4-5 inches long. Superficial. Mild erythema noted. No warmth, no drainage. No signs of acute infection. There is a second small abrasion at the base of the anterior right index finger. Likely tiny skin flap present which may fall off at some point. No signs of infection.    Neurological:      Mental Status: She is alert and oriented to person, place, and time.      Comments: She has some mild paraesthesias of the anterior 4th and 5th digits.    Psychiatric:         Behavior: Behavior normal.        Result Review :  The following data was reviewed by: ZITA Sanderson on 08/29/2022:            Data reviewed: Radiologic studies MRI report above.         Assessment / Plan         Assessment   Diagnoses and all orders for this visit:    1. Fall, initial encounter (Primary)  2. Right wrist pain  3. Abrasion, wrist w/o infection  • No signs of active infection to the abrasions on the right wrist/hand.  • Encouraged gentle cleansing with warm water and antibacterial soap.  • Can continue to apply triple antibiotic ointment.  • Will obtain three-view x-ray of the right wrist.  • Will give a one-time dose of Toradol 60 mg IM today.  Can take ibuprofen and/or Tylenol for pain at home.  -     XR Wrist 3+ View Right; Future  -     ketorolac (TORADOL) injection 60 mg    4. Nausea/Vomiting  • Signs of acute abdominal pain, chest pain or dyspnea.  • Nausea is likely related to pain of the right  wrist.  • As needed Zofran ordered.  • She was encouraged to monitor for development of other associated symptoms and let us know or go to the ED if symptoms are significant or associated with chest pain.  • Encouraged drinking plenty of fluids and bland diet.  -     ondansetron (ZOFRAN) 4 MG tablet; Take 1 tablet by mouth Every 8 (Eight) Hours As Needed for Nausea or Vomiting.  Dispense: 30 tablet; Refill: 1         New Medications Ordered This Visit   Medications   • ondansetron (ZOFRAN) 4 MG tablet     Sig: Take 1 tablet by mouth Every 8 (Eight) Hours As Needed for Nausea or Vomiting.     Dispense:  30 tablet     Refill:  1   • ketorolac (TORADOL) injection 60 mg     Follow Up   Return for Next scheduled follow up.    Patient was given instructions and counseling regarding her condition or for health maintenance advice. Please see specific information pulled into the AVS if appropriate.       This document has been electronically signed by ZITA Sanderson   August 29, 2022 11:29 EDT    Dictated Utilizing Dragon Dictation: Part of this note may be an electronic transcription/translation of spoken language to printed text using the Dragon Dictation System.

## 2022-08-29 NOTE — PATIENT INSTRUCTIONS
BMI for Adults  Body mass index (BMI) is a number that is calculated from a person's weight and height. In most adults, the number is used to find how much of an adult's weight is made up of fat. BMI is not as accurate as a direct measure of body fat.  HOW IS BMI CALCULATED?  BMI is calculated by dividing weight in kilograms by height in meters squared. It can also be calculated by dividing weight in pounds by height in inches squared, then multiplying the resulting number by 703. Charts are available to help you find your BMI quickly and easily without doing this calculation.   HOW IS BMI INTERPRETED?  Health care professionals use BMI charts to identify whether an adult is underweight, at a normal weight, or overweight based on the following guidelines:  Underweight: BMI less than 18.5.  Normal weight: BMI between 18.5 and 24.9.  Overweight: BMI between 25 and 29.9.  Obese: BMI of 30 and above.  BMI is usually interpreted the same for males and females.  Weight includes both fat and muscle, so someone with a muscular build, such as an athlete, may have a BMI that is higher than 24.9. In cases like these, BMI may not accurately depict body fat. To determine if excess body fat is the cause of a BMI of 25 or higher, further assessments may need to be done by a health care provider.  WHY IS BMI A USEFUL TOOL?  BMI is used to identify a possible weight problem that may be related to a medical problem or may increase the risk for medical problems. BMI can also be used to promote changes to reach a healthy weight.     This information is not intended to replace advice given to you by your health care provider. Make sure you discuss any questions you have with your health care provider.     Document Released: 08/29/2005 Document Revised: 01/08/2016 Document Reviewed: 05/15/2015  [employer] Interactive Patient Education ©2017 [employer]       Calorie Counting for Weight Loss  Calories are energy you get from the things  you eat and drink. Your body uses this energy to keep you going throughout the day. The number of calories you eat affects your weight. When you eat more calories than your body needs, your body stores the extra calories as fat. When you eat fewer calories than your body needs, your body burns fat to get the energy it needs.  Calorie counting means keeping track of how many calories you eat and drink each day. If you make sure to eat fewer calories than your body needs, you should lose weight. In order for calorie counting to work, you will need to eat the number of calories that are right for you in a day to lose a healthy amount of weight per week. A healthy amount of weight to lose per week is usually 1-2 lb (0.5-0.9 kg). A dietitian can determine how many calories you need in a day and give you suggestions on how to reach your calorie goal.   WHAT IS MY MY PLAN?  My goal is to have __________ calories per day.   If I have this many calories per day, I should lose around __________ pounds per week.  WHAT DO I NEED TO KNOW ABOUT CALORIE COUNTING?  In order to meet your daily calorie goal, you will need to:  Find out how many calories are in each food you would like to eat. Try to do this before you eat.  Decide how much of the food you can eat.  Write down what you ate and how many calories it had. Doing this is called keeping a food log.  WHERE DO I FIND CALORIE INFORMATION?  The number of calories in a food can be found on a Nutrition Facts label. Note that all the information on a label is based on a specific serving of the food. If a food does not have a Nutrition Facts label, try to look up the calories online or ask your dietitian for help.  HOW DO I DECIDE HOW MUCH TO EAT?  To decide how much of the food you can eat, you will need to consider both the number of calories in one serving and the size of one serving. This information can be found on the Nutrition Facts label. If a food does not have a Nutrition  Facts label, look up the information online or ask your dietitian for help.  Remember that calories are listed per serving. If you choose to have more than one serving of a food, you will have to multiply the calories per serving by the amount of servings you plan to eat. For example, the label on a package of bread might say that a serving size is 1 slice and that there are 90 calories in a serving. If you eat 1 slice, you will have eaten 90 calories. If you eat 2 slices, you will have eaten 180 calories.  HOW DO I KEEP A FOOD LOG?  After each meal, record the following information in your food log:  What you ate.  How much of it you ate.  How many calories it had.  Then, add up your calories.  Keep your food log near you, such as in a small notebook in your pocket. Another option is to use a mobile genet or website. Some programs will calculate calories for you and show you how many calories you have left each time you add an item to the log.  WHAT ARE SOME CALORIE COUNTING TIPS?  Use your calories on foods and drinks that will fill you up and not leave you hungry. Some examples of this include foods like nuts and nut butters, vegetables, lean proteins, and high-fiber foods (more than 5 g fiber per serving).  Eat nutritious foods and avoid empty calories. Empty calories are calories you get from foods or beverages that do not have many nutrients, such as candy and soda. It is better to have a nutritious high-calorie food (such as an avocado) than a food with few nutrients (such as a bag of chips).  Know how many calories are in the foods you eat most often. This way, you do not have to look up how many calories they have each time you eat them.  Look out for foods that may seem like low-calorie foods but are really high-calorie foods, such as baked goods, soda, and fat-free candy.  Pay attention to calories in drinks. Drinks such as sodas, specialty coffee drinks, alcohol, and juices have a lot of calories yet do  not fill you up. Choose low-calorie drinks like water and diet drinks.  Focus your calorie counting efforts on higher calorie items. Logging the calories in a garden salad that contains only vegetables is less important than calculating the calories in a milk shake.  Find a way of tracking calories that works for you. Get creative. Most people who are successful find ways to keep track of how much they eat in a day, even if they do not count every calorie.  WHAT ARE SOME PORTION CONTROL TIPS?  Know how many calories are in a serving. This will help you know how many servings of a certain food you can have.  Use a measuring cup to measure serving sizes. This is helpful when you start out. With time, you will be able to estimate serving sizes for some foods.  Take some time to put servings of different foods on your favorite plates, bowls, and cups so you know what a serving looks like.  Try not to eat straight from a bag or box. Doing this can lead to overeating. Put the amount you would like to eat in a cup or on a plate to make sure you are eating the right portion.  Use smaller plates, glasses, and bowls to prevent overeating. This is a quick and easy way to practice portion control. If your plate is smaller, less food can fit on it.  Try not to multitask while eating, such as watching TV or using your computer. If it is time to eat, sit down at a table and enjoy your food. Doing this will help you to start recognizing when you are full. It will also make you more aware of what and how much you are eating.  HOW CAN I CALORIE COUNT WHEN EATING OUT?  Ask for smaller portion sizes or child-sized portions.  Consider sharing an entree and sides instead of getting your own entree.  If you get your own entree, eat only half. Ask for a box at the beginning of your meal and put the rest of your entree in it so you are not tempted to eat it.  Look for the calories on the menu. If calories are listed, choose the lower  "calorie options.  Choose dishes that include vegetables, fruits, whole grains, low-fat dairy products, and lean protein. Focusing on smart food choices from each of the 5 food groups can help you stay on track at restaurants.  Choose items that are boiled, broiled, grilled, or steamed.  Choose water, milk, unsweetened iced tea, or other drinks without added sugars. If you want an alcoholic beverage, choose a lower calorie option. For example, a regular som can have up to 700 calories and a glass of wine has around 150.  Stay away from items that are buttered, battered, fried, or served with cream sauce. Items labeled \"crispy\" are usually fried, unless stated otherwise.  Ask for dressings, sauces, and syrups on the side. These are usually very high in calories, so do not eat much of them.  Watch out for salads. Many people think salads are a healthy option, but this is often not the case. Many salads come with gonzales, fried chicken, lots of cheese, fried chips, and dressing. All of these items have a lot of calories. If you want a salad, choose a garden salad and ask for grilled meats or steak. Ask for the dressing on the side, or ask for olive oil and vinegar or lemon to use as dressing.  Estimate how many servings of a food you are given. For example, a serving of cooked rice is ½ cup or about the size of half a tennis ball or one cupcake wrapper. Knowing serving sizes will help you be aware of how much food you are eating at restaurants. The list below tells you how big or small some common portion sizes are based on everyday objects.  1 oz--4 stacked dice.  3 oz--1 deck of cards.  1 tsp--1 dice.  1 Tbsp--½ a Ping-Pong ball.  2 Tbsp--1 Ping-Pong ball.  ½ cup--1 tennis ball or 1 cupcake wrapper.  1 cup--1 baseball.     This information is not intended to replace advice given to you by your health care provider. Make sure you discuss any questions you have with your health care provider.     Document Released: " 12/18/2006 Document Revised: 01/08/2016 Document Reviewed: 10/23/2014  [employer] Interactive Patient Education ©2017 [employer]

## 2022-08-31 ENCOUNTER — TELEPHONE (OUTPATIENT)
Dept: FAMILY MEDICINE CLINIC | Facility: CLINIC | Age: 47
End: 2022-08-31

## 2022-08-31 NOTE — TELEPHONE ENCOUNTER
Caller: Iraida Heard    Relationship: Self    Best call back number: [phone]    Caller requesting test results: SELF  What test was performed: X-RAY HAND AND WRIST    When was the test performed: 08/29/2022    Where was the test performed: [facility]    Additional notes: PATIENT CALLED CHECKING ON X-RAY RESULTS. PLEASE ADVISE AND CALL PATIENT BACK

## 2022-09-13 ENCOUNTER — LAB (OUTPATIENT)
Dept: FAMILY MEDICINE CLINIC | Facility: CLINIC | Age: 47
End: 2022-09-13

## 2022-09-13 DIAGNOSIS — E11.9 TYPE 2 DIABETES MELLITUS WITHOUT COMPLICATION, WITHOUT LONG-TERM CURRENT USE OF INSULIN: ICD-10-CM

## 2022-09-13 DIAGNOSIS — I10 ESSENTIAL HYPERTENSION: ICD-10-CM

## 2022-09-13 DIAGNOSIS — E78.2 MIXED HYPERLIPIDEMIA: ICD-10-CM

## 2022-09-14 LAB
ALBUMIN SERPL-MCNC: 4.7 G/DL (ref 3.5–5.2)
ALBUMIN/GLOB SERPL: 2.1 G/DL
ALP SERPL-CCNC: 63 U/L (ref 39–117)
ALT SERPL-CCNC: 23 U/L (ref 1–33)
AST SERPL-CCNC: 19 U/L (ref 1–32)
BASOPHILS # BLD AUTO: 0.04 10*3/MM3 (ref 0–0.2)
BASOPHILS NFR BLD AUTO: 0.4 % (ref 0–1.5)
BILIRUB SERPL-MCNC: 0.3 MG/DL (ref 0–1.2)
BUN SERPL-MCNC: 11 MG/DL (ref 6–20)
BUN/CREAT SERPL: 13.4 (ref 7–25)
CALCIUM SERPL-MCNC: 9.1 MG/DL (ref 8.6–10.5)
CHLORIDE SERPL-SCNC: 103 MMOL/L (ref 98–107)
CHOLEST SERPL-MCNC: 147 MG/DL (ref 0–200)
CO2 SERPL-SCNC: 26 MMOL/L (ref 22–29)
CREAT SERPL-MCNC: 0.82 MG/DL (ref 0.57–1)
EGFRCR-CYS SERPLBLD CKD-EPI 2021: 89.5 ML/MIN/1.73
EOSINOPHIL # BLD AUTO: 0.11 10*3/MM3 (ref 0–0.4)
EOSINOPHIL NFR BLD AUTO: 1.2 % (ref 0.3–6.2)
ERYTHROCYTE [DISTWIDTH] IN BLOOD BY AUTOMATED COUNT: 12.1 % (ref 12.3–15.4)
GLOBULIN SER CALC-MCNC: 2.2 GM/DL
GLUCOSE SERPL-MCNC: 190 MG/DL (ref 65–99)
HBA1C MFR BLD: 6.2 % (ref 4.8–5.6)
HCT VFR BLD AUTO: 43.7 % (ref 34–46.6)
HDLC SERPL-MCNC: 45 MG/DL (ref 40–60)
HGB BLD-MCNC: 14.5 G/DL (ref 12–15.9)
IMM GRANULOCYTES # BLD AUTO: 0.04 10*3/MM3 (ref 0–0.05)
IMM GRANULOCYTES NFR BLD AUTO: 0.4 % (ref 0–0.5)
LDLC SERPL CALC-MCNC: 74 MG/DL (ref 0–100)
LYMPHOCYTES # BLD AUTO: 2.46 10*3/MM3 (ref 0.7–3.1)
LYMPHOCYTES NFR BLD AUTO: 26.4 % (ref 19.6–45.3)
MCH RBC QN AUTO: 31.5 PG (ref 26.6–33)
MCHC RBC AUTO-ENTMCNC: 33.2 G/DL (ref 31.5–35.7)
MCV RBC AUTO: 94.8 FL (ref 79–97)
MONOCYTES # BLD AUTO: 0.73 10*3/MM3 (ref 0.1–0.9)
MONOCYTES NFR BLD AUTO: 7.8 % (ref 5–12)
NEUTROPHILS # BLD AUTO: 5.94 10*3/MM3 (ref 1.7–7)
NEUTROPHILS NFR BLD AUTO: 63.8 % (ref 42.7–76)
NRBC BLD AUTO-RTO: 0 /100 WBC (ref 0–0.2)
PLATELET # BLD AUTO: 293 10*3/MM3 (ref 140–450)
POTASSIUM SERPL-SCNC: 4.6 MMOL/L (ref 3.5–5.2)
PROT SERPL-MCNC: 6.9 G/DL (ref 6–8.5)
RBC # BLD AUTO: 4.61 10*6/MM3 (ref 3.77–5.28)
SODIUM SERPL-SCNC: 138 MMOL/L (ref 136–145)
TRIGL SERPL-MCNC: 165 MG/DL (ref 0–150)
VLDLC SERPL CALC-MCNC: 28 MG/DL (ref 5–40)
WBC # BLD AUTO: 9.32 10*3/MM3 (ref 3.4–10.8)

## 2022-09-15 ENCOUNTER — OFFICE VISIT (OUTPATIENT)
Dept: FAMILY MEDICINE CLINIC | Facility: CLINIC | Age: 47
End: 2022-09-15

## 2022-09-15 VITALS
OXYGEN SATURATION: 94 % | HEART RATE: 64 BPM | DIASTOLIC BLOOD PRESSURE: 65 MMHG | BODY MASS INDEX: 29.41 KG/M2 | SYSTOLIC BLOOD PRESSURE: 118 MMHG | WEIGHT: 183 LBS | TEMPERATURE: 98.6 F | RESPIRATION RATE: 14 BRPM | HEIGHT: 66 IN

## 2022-09-15 DIAGNOSIS — E78.2 MIXED HYPERLIPIDEMIA: ICD-10-CM

## 2022-09-15 DIAGNOSIS — J30.9 CHRONIC ALLERGIC RHINITIS: Primary | ICD-10-CM

## 2022-09-15 DIAGNOSIS — Z23 ENCOUNTER FOR IMMUNIZATION: ICD-10-CM

## 2022-09-15 DIAGNOSIS — Z00.00 HEALTHCARE MAINTENANCE: ICD-10-CM

## 2022-09-15 DIAGNOSIS — M54.59 MECHANICAL LOW BACK PAIN: ICD-10-CM

## 2022-09-15 DIAGNOSIS — F41.0 PANIC ATTACKS: ICD-10-CM

## 2022-09-15 DIAGNOSIS — R23.2 HOT FLASHES: ICD-10-CM

## 2022-09-15 DIAGNOSIS — I10 ESSENTIAL HYPERTENSION: ICD-10-CM

## 2022-09-15 DIAGNOSIS — G47.33 OSA (OBSTRUCTIVE SLEEP APNEA): ICD-10-CM

## 2022-09-15 DIAGNOSIS — E11.9 TYPE 2 DIABETES MELLITUS WITHOUT COMPLICATION, WITHOUT LONG-TERM CURRENT USE OF INSULIN: ICD-10-CM

## 2022-09-15 DIAGNOSIS — I47.29 NONSUSTAINED VENTRICULAR TACHYCARDIA: ICD-10-CM

## 2022-09-15 PROCEDURE — 96372 THER/PROPH/DIAG INJ SC/IM: CPT | Performed by: GENERAL PRACTICE

## 2022-09-15 PROCEDURE — G0009 ADMIN PNEUMOCOCCAL VACCINE: HCPCS | Performed by: GENERAL PRACTICE

## 2022-09-15 PROCEDURE — G0008 ADMIN INFLUENZA VIRUS VAC: HCPCS | Performed by: GENERAL PRACTICE

## 2022-09-15 PROCEDURE — 90686 IIV4 VACC NO PRSV 0.5 ML IM: CPT | Performed by: GENERAL PRACTICE

## 2022-09-15 PROCEDURE — 99214 OFFICE O/P EST MOD 30 MIN: CPT | Performed by: GENERAL PRACTICE

## 2022-09-15 PROCEDURE — 90677 PCV20 VACCINE IM: CPT | Performed by: GENERAL PRACTICE

## 2022-09-15 RX ORDER — SEMAGLUTIDE 1.34 MG/ML
0.5 INJECTION, SOLUTION SUBCUTANEOUS WEEKLY
Qty: 6 ML | Refills: 3 | Status: SHIPPED | OUTPATIENT
Start: 2022-09-15 | End: 2022-10-03 | Stop reason: SDUPTHER

## 2022-09-15 RX ORDER — KETOROLAC TROMETHAMINE 30 MG/ML
60 INJECTION, SOLUTION INTRAMUSCULAR; INTRAVENOUS ONCE
Status: COMPLETED | OUTPATIENT
Start: 2022-09-15 | End: 2022-09-15

## 2022-09-15 RX ADMIN — KETOROLAC TROMETHAMINE 60 MG: 30 INJECTION, SOLUTION INTRAMUSCULAR; INTRAVENOUS at 17:50

## 2022-09-15 NOTE — PROGRESS NOTES
Subjective   Iraida Heard is a 47 y.o. female.     Chief Complaint  She returns for a scheduled reassessment of multiple medical problems including type 2 diabetes mellitus, hyperlipidemia, chronic allergic tinnitus, chronic anxiety with panic attacks, and chronic low back pain    History of Present Illness     Type 2 Diabetes Mellitus  She has been following an appropriate diet and pursuing regular exercise.  She was changed from dulaglutide 1.5 weekly to semaglutide 1 weekly but had to discontinue this due to persistent nausea.  She also discontinued metformin for the same  Lab Results   Component Value Date    HGBA1C 6.20 (H) 09/13/2022      Hyperlipidemia  She remains on rosuvastatin with no apparent side effects.  Lab Results   Component Value Date    CHLPL 147 09/13/2022    TRIG 165 (H) 09/13/2022    HDL 45 09/13/2022    LDL 74 09/13/2022     Chronic Allergic Rhinitis  She gives a long history of intermittent nasal congestion and postnasal drip.  She denies any other upper respiratory tract symptoms and there is no history of any fever or chills. She remains on fexofenadine, pseudoephedrine, and montelukast with no apparent side effects.  She discontinued nasal fluticasone due to irritation.  She gives a history of previous immunotherapy and has also had sinus surgery twice.  She is currently followed by ENT    Panic Attacks  She admits to intermittent episodes of nervousness, worrying, flushing, and diaphoresis.  She denies persistent anxiety and there is no history of depression, loss of interest in activities, or suicide ideation.  She remains on fluoxetine    Chronic Low Back Pain  She gives a 4 to 5 year history of intermittent low back pain.  This has been worse over the last year.  There is no history of any recent strain or trauma nor any change in her activities.  The pain is described as a bilateral lower ache.  The pain does not radiate elsewhere at present and she continues to deny any weakness,  numbness, tingling, or any change in her bowel/bladder control.  The pain increases with activity improves with rest. She has seen both a physical therapist and a chiropractor in the past with little improvement in her symptoms.  She was followed by pain management and underwent several injections with only a transient improvement each time.  MRI of the lumbar spine performed on 8/8/2022 was reported as showing a small right disc herniation at L4-5 with moderate neuroforaminal stenosis    Labs  Lab Results   Component Value Date    WBC 9.32 09/13/2022    HGB 14.5 09/13/2022    HCT 43.7 09/13/2022    MCV 94.8 09/13/2022     09/13/2022     Lab Results   Component Value Date    GLUCOSE 190 (H) 09/13/2022    BUN 11 09/13/2022    CREATININE 0.82 09/13/2022    EGFRIFNONA 76 05/03/2021    EGFRIFNONA 75 05/03/2021    EGFRIFAFRI 93 05/03/2021    EGFRIFAFRI 91 05/03/2021    BCR 13.4 09/13/2022    K 4.6 09/13/2022    CO2 26.0 09/13/2022    CALCIUM 9.1 09/13/2022    PROTENTOTREF 6.9 09/13/2022    ALBUMIN 4.70 09/13/2022    LABIL2 2.1 09/13/2022    AST 19 09/13/2022    ALT 23 09/13/2022     Lab Results   Component Value Date    ALKPHOS 63 09/13/2022    ALKPHOS 65 03/04/2021     The following portions of the patient's history were reviewed and updated as appropriate: allergies, current medications, past medical history, past social history and problem list.    Review of Systems   Constitutional: Positive for fatigue. Negative for appetite change, chills, fever and unexpected weight change.   HENT: Positive for congestion and rhinorrhea. Negative for ear pain, postnasal drip, sinus pressure, sneezing and sore throat.    Eyes: Negative for visual disturbance.   Respiratory: Negative for cough, shortness of breath and wheezing.    Cardiovascular: Negative for chest pain, palpitations and leg swelling.   Gastrointestinal: Negative for abdominal pain, blood in stool, constipation, diarrhea, nausea and vomiting.   Endocrine:         Intermittent hot flashes   Genitourinary: Negative for difficulty urinating, dysuria, frequency, hematuria and urgency.   Musculoskeletal: Positive for back pain. Negative for arthralgias, joint swelling, myalgias and neck pain.   Skin: Negative for rash.   Neurological: Negative for weakness, numbness and headaches.   Psychiatric/Behavioral: Positive for sleep disturbance. Negative for dysphoric mood and suicidal ideas. The patient is nervous/anxious.      Objective   Physical Exam  Constitutional:       General: She is not in acute distress.     Appearance: Normal appearance. She is well-developed. She is not diaphoretic.      Comments: Accompanied by her mother.  Bright and in good spirits. No apparent distress. No pallor, jaundice, diaphoresis, or cyanosis.   HENT:      Head: Atraumatic.      Right Ear: Tympanic membrane, ear canal and external ear normal.      Left Ear: Tympanic membrane, ear canal and external ear normal.      Mouth/Throat:      Mouth: Mucous membranes are moist.      Pharynx: No posterior oropharyngeal erythema.   Eyes:      Conjunctiva/sclera: Conjunctivae normal.   Neck:      Thyroid: No thyroid mass or thyromegaly.      Vascular: No carotid bruit or JVD.      Trachea: Trachea normal. No tracheal deviation.   Cardiovascular:      Rate and Rhythm: Normal rate and regular rhythm.      Heart sounds: Normal heart sounds, S1 normal and S2 normal. No murmur heard.    No gallop.   Pulmonary:      Effort: Pulmonary effort is normal.      Breath sounds: Normal breath sounds.   Chest:   Breasts:      Right: No supraclavicular adenopathy.      Left: No supraclavicular adenopathy.       Abdominal:      General: Bowel sounds are normal. There is no distension.   Musculoskeletal:      Lumbar back: Negative right straight leg raise test and negative left straight leg raise test.      Right lower leg: No edema.      Left lower leg: No edema.   Lymphadenopathy:      Head:      Right side of head: No  submental, submandibular, tonsillar, preauricular, posterior auricular or occipital adenopathy.      Left side of head: No submental, submandibular, tonsillar, preauricular, posterior auricular or occipital adenopathy.      Cervical: No cervical adenopathy.      Upper Body:      Right upper body: No supraclavicular adenopathy.      Left upper body: No supraclavicular adenopathy.   Skin:     General: Skin is warm.      Coloration: Skin is not cyanotic, jaundiced or pale.      Findings: No rash.      Nails: There is no clubbing.   Neurological:      Mental Status: She is alert and oriented to person, place, and time.      Cranial Nerves: No cranial nerve deficit.      Motor: No weakness or tremor.      Coordination: Coordination normal.      Gait: Gait normal.   Psychiatric:         Attention and Perception: Attention normal.         Mood and Affect: Mood normal.         Speech: Speech normal.         Behavior: Behavior normal.         Thought Content: Thought content normal.       Assessment & Plan   Problems Addressed this Visit        Allergies and Adverse Reactions    Chronic allergic rhinitis   Reminded regarding allergen avoidance.  Continue current medication       Cardiac and Vasculature    Essential hypertension   Hypertension: BP remains at goal off medication. Evidence of target organ damage: none.  Encouraged to continue to work on diet and exercise plan.     Mixed hyperlipidemia  As above.   Continue current medication.    Nonsustained ventricular tachycardia (HCC)       Endocrine and Metabolic    Type 2 diabetes mellitus, without long-term current use of insulin (HCC)  Diabetes mellitus Type II, under excellent control.   Encouraged to continue to pursue ADA diet  Encouraged aerobic exercise.  Semaglutide will be resumed at a lower dose  Scheduled for updated labs just prior to her return in 3 months    Relevant Medications    Semaglutide,0.25 or 0.5MG/DOS, (Ozempic, 0.25 or 0.5 MG/DOSE,) 2 MG/1.5ML  solution pen-injector    Other Relevant Orders    Hemoglobin A1c    MicroAlbumin, Urine, Random - Urine, Clean Catch       Health Encounters    Healthcare maintenance  Flu shot and Prevnar 20 administered  Recommended an updated bivalent COVID-19 vaccination.  Will arrange an updated mammogram at her return    Relevant Orders    Pneumococcal Conjugate Vaccine 20-Valent All (Completed)       Mental Health    Panic attacks  Stable.  Supportive therapy.   Continue current medication.       Musculoskeletal and Injuries    Mechanical low back pain  Reminded regarding symptomatic treatment.   Continue current medication  Encouraged to report if any worse or if any new symptoms or concerns.    Relevant Medications    ketorolac (TORADOL) injection 60 mg (Completed)       Sleep    KATALINA (obstructive sleep apnea)       Symptoms and Signs    Hot flashes          Diagnoses       Codes Comments    Chronic allergic rhinitis    -  Primary ICD-10-CM: J30.9  ICD-9-CM: 477.9     Essential hypertension     ICD-10-CM: I10  ICD-9-CM: 401.9     Mixed hyperlipidemia     ICD-10-CM: E78.2  ICD-9-CM: 272.2     Nonsustained ventricular tachycardia (HCC)     ICD-10-CM: I47.2  ICD-9-CM: 427.1     Type 2 diabetes mellitus without complication, without long-term current use of insulin (HCC)     ICD-10-CM: E11.9  ICD-9-CM: 250.00     Healthcare maintenance     ICD-10-CM: Z00.00  ICD-9-CM: V70.0     Panic attacks     ICD-10-CM: F41.0  ICD-9-CM: 300.01     Mechanical low back pain     ICD-10-CM: M54.59  ICD-9-CM: 724.2     KATALINA (obstructive sleep apnea)     ICD-10-CM: G47.33  ICD-9-CM: 327.23     Hot flashes     ICD-10-CM: R23.2  ICD-9-CM: 782.62     Encounter for immunization     ICD-10-CM: Z23  ICD-9-CM: V03.89

## 2022-09-15 NOTE — PROGRESS NOTES
Injection  Injection performed in right dorsogluteal by Natasha Roberts MA. Patient tolerated the procedure well without complications.  09/15/22   Natasha Roberts MA

## 2022-09-17 LAB — MICROALBUMIN UR-MCNC: <3 UG/ML

## 2022-10-03 ENCOUNTER — OFFICE VISIT (OUTPATIENT)
Dept: FAMILY MEDICINE CLINIC | Facility: CLINIC | Age: 47
End: 2022-10-03

## 2022-10-03 VITALS
HEIGHT: 66 IN | BODY MASS INDEX: 29.73 KG/M2 | HEART RATE: 65 BPM | WEIGHT: 185 LBS | SYSTOLIC BLOOD PRESSURE: 110 MMHG | DIASTOLIC BLOOD PRESSURE: 76 MMHG | OXYGEN SATURATION: 97 % | TEMPERATURE: 97.6 F

## 2022-10-03 DIAGNOSIS — M20.009 FINGER DEFORMITY: ICD-10-CM

## 2022-10-03 DIAGNOSIS — E11.9 TYPE 2 DIABETES MELLITUS WITHOUT COMPLICATION, WITHOUT LONG-TERM CURRENT USE OF INSULIN: ICD-10-CM
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
## 2022-10-03 PROCEDURE — 99213 OFFICE O/P EST LOW 20 MIN: CPT | Performed by: PHYSICIAN ASSISTANT

## 2022-10-03 RX ORDER — SEMAGLUTIDE 1.34 MG/ML
0.5 INJECTION, SOLUTION SUBCUTANEOUS WEEKLY
Qty: 6 ML | Refills: 3 | COMMUNITY
Start: 2022-10-03 | End: 2023-01-16 | Stop reason: SDUPTHER

## 2022-10-03 NOTE — PROGRESS NOTES
Subjective        Chief Complaint  Finger Injury    Subjective      History of Present Illness  Iraida Heard is a 47 y.o. female who presents today to [facility] for right 5th finger pain. Past medical history is significant for type 2 diabetes mellitus, dyslipidemia, chronic allergic rhinitis, and chronic anxiety.    Right fifth finger pain:  She was previously evaluated in our office on 8/29/2022 after suffering a fall.  She had an abrasion on her right wrist and complained of pain in the wrist.  X-rays at that time were negative for acute wrist fracture.  Since then, she has continued to have some edema and pain of the right fifth finger.  She also notes some edema of the right fourth finger and is unable to wear her rings.  Denies any recurrent injury or falls.  She has been taking over-the-counter ibuprofen for pain.      Current Outpatient Medications:   •  acetaminophen (TYLENOL) 500 MG tablet, Take 500 mg by mouth Every 8 (Eight) Hours As Needed., Disp: , Rfl:   •  Blood Glucose Monitoring Suppl kit, 1 Device Daily., Disp: 1 each, Rfl: 0  •  Calcium Polycarbophil (FIBER LAXATIVE PO), Take  by mouth., Disp: , Rfl:   •  Cranberry 125 MG tablet, Take  by mouth Daily., Disp: , Rfl:   •  diazePAM (VALIUM) 5 MG tablet, 1 tablet 1 hour before schedule procedure, Disp: 1 tablet, Rfl: 0  •  dicyclomine (BENTYL) 20 MG tablet, Take 1 tablet by mouth 2 (Two) Times a Day., Disp: 180 tablet, Rfl: 3  •  fexofenadine-pseudoephedrine (ALLEGRA-D 24) 180-240 MG per 24 hr tablet, Take 1 tablet by mouth Daily As Needed for Allergies., Disp: 30 tablet, Rfl: 0  •  FLUoxetine (PROzac) 20 MG capsule, TAKE 1 CAPSULE EVERY MORNING, Disp: 90 capsule, Rfl: 3  •  glucose blood test strip, 1 each by Other route Daily. Use as instructed, Disp: 100 each, Rfl: 3  •  Lancets Ultra Fine misc, 1 Device Daily., Disp: 100 each, Rfl: 3  •  montelukast (SINGULAIR) 10 MG tablet, TAKE 1 TABLET EVERY DAY, Disp:  "90 tablet, Rfl: 3  •  omeprazole (priLOSEC) 20 MG capsule, TAKE 1 CAPSULE EVERY DAY, Disp: 90 capsule, Rfl: 3  •  ondansetron (ZOFRAN) 4 MG tablet, Take 1 tablet by mouth Every 8 (Eight) Hours As Needed for Nausea or Vomiting., Disp: 30 tablet, Rfl: 1  •  rosuvastatin (CRESTOR) 20 MG tablet, Take 1 tablet by mouth Daily., Disp: 90 tablet, Rfl: 1  •  Semaglutide,0.25 or 0.5MG/DOS, (Ozempic, 0.25 or 0.5 MG/DOSE,) 2 MG/1.5ML solution pen-injector, Inject 0.5 mg under the skin into the appropriate area as directed 1 (One) Time Per Week., Disp: 6 mL, Rfl: 3      No Known Allergies    Objective     Objective   Vital Signs:  Blood Pressure 110/76 (BP Location: Right arm, Patient Position: Sitting, Cuff Size: Adult)   Pulse 65   Temperature 97.6 °F (36.4 °C) (Temporal)   Height 167.6 cm (65.98\")   Weight 83.9 kg (185 lb)   Oxygen Saturation 97%   Body Mass Index 29.87 kg/m²   Estimated body mass index is 29.87 kg/m² as calculated from the following:    Height as of this encounter: 167.6 cm (65.98\").    Weight as of this encounter: 83.9 kg (185 lb).    BMI is >= 25 and <30. (Overweight) The following options were offered after discussion;: weight loss educational material (shared in after visit summary)    Past Medical History:   Diagnosis Date   • Arthritis    • Hyperlipidemia    • Hypertension 2/9/2021   • Type 2 diabetes mellitus, without long-term current use of insulin (HCC) 3/29/2022     Past Surgical History:   Procedure Laterality Date   • APPENDECTOMY     • CHOLECYSTECTOMY     • EXCISION BENIGN SKIN LESION SCALP / NECK / HANDS / FEET / GENITALIA     • GALLBLADDER SURGERY     • HYSTERECTOMY  2015    GELY/RSO for benign disease   • SINUS SURGERY     • TONSILLECTOMY       Social History     Socioeconomic History   • Marital status:    Tobacco Use   • Smoking status: Never Smoker   • Smokeless tobacco: Never Used   Vaping Use   • Vaping Use: Never used   Substance and Sexual Activity   • Alcohol use: Never "   • Drug use: Never   • Sexual activity: Defer      Physical Exam  Constitutional:       General: She is not in acute distress.     Appearance: Normal appearance. She is not ill-appearing, toxic-appearing or diaphoretic.   HENT:      Head: Normocephalic and atraumatic.   Musculoskeletal:         General: Swelling and tenderness present.      Comments: Mild edema and tenderness of the PIP joint on the right 5th finger. Mild erythema noted medially. No warmth, wound, or drainage. She can fully extend the finger but can't fully flex to touch the palm.    Skin:     General: Skin is warm and dry.      Capillary Refill: Capillary refill takes less than 2 seconds.   Neurological:      Mental Status: She is alert and oriented to person, place, and time.   Psychiatric:         Behavior: Behavior normal.        Result Review :    Orders Only on 09/16/2022   Component Date Value Ref Range Status   • Microalbumin, Urine 09/16/2022 <3.0  Not Estab. ug/mL Final   Lab on 09/13/2022   Component Date Value Ref Range Status   • Hemoglobin A1C 09/13/2022 6.20 (A) 4.80 - 5.60 % Final    Comment: Hemoglobin A1C Ranges:  Increased Risk for Diabetes  5.7% to 6.4%  Diabetes                     >= 6.5%  Diabetic Goal                < 7.0%     • Total Cholesterol 09/13/2022 147  0 - 200 mg/dL Final    Comment: Cholesterol Reference Ranges  (U.S. Department of Health and Human Services ATP III  Classifications)  Desirable          <200 mg/dL  Borderline High    200-239 mg/dL  High Risk          >240 mg/dL  Triglyceride Reference Ranges  (U.S. Department of Health and Human Services ATP III  Classifications)  Normal           <150 mg/dL  Borderline High  150-199 mg/dL  High             200-499 mg/dL  Very High        >500 mg/dL  HDL Reference Ranges  (U.S. Department of Health and Human Services ATP III  Classifications)  Low     <40 mg/dl (major risk factor for CHD)  High    >60 mg/dl ('negative' risk factor for CHD)  LDL Reference  Ranges  (U.S. Department of Health and Human Services ATP III  Classifications)  Optimal          <100 mg/dL  Near Optimal     100-129 mg/dL  Borderline High  130-159 mg/dL  High             160-189 mg/dL  Very High        >189 mg/dL     • Triglycerides 09/13/2022 165 (A) 0 - 150 mg/dL Final   • HDL Cholesterol 09/13/2022 45  40 - 60 mg/dL Final   • VLDL Cholesterol Florin 09/13/2022 28  5 - 40 mg/dL Final   • LDL Chol Calc (NIH) 09/13/2022 74  0 - 100 mg/dL Final   • Glucose 09/13/2022 190 (A) 65 - 99 mg/dL Final   • BUN 09/13/2022 11  6 - 20 mg/dL Final   • Creatinine 09/13/2022 0.82  0.57 - 1.00 mg/dL Final   • EGFR Result 09/13/2022 89.5  >60.0 mL/min/1.73 Final    Comment: National Kidney Foundation and American Society of  Nephrology (ASN) Task Force recommended calculation based  on the Chronic Kidney Disease Epidemiology Collaboration  (CKD-EPI) equation refit without adjustment for race.  GFR Normal >60  Chronic Kidney Disease <60  Kidney Failure <15     • BUN/Creatinine Ratio 09/13/2022 13.4  7.0 - 25.0 Final   • Sodium 09/13/2022 138  136 - 145 mmol/L Final   • Potassium 09/13/2022 4.6  3.5 - 5.2 mmol/L Final   • Chloride 09/13/2022 103  98 - 107 mmol/L Final   • Total CO2 09/13/2022 26.0  22.0 - 29.0 mmol/L Final   • Calcium 09/13/2022 9.1  8.6 - 10.5 mg/dL Final   • Total Protein 09/13/2022 6.9  6.0 - 8.5 g/dL Final   • Albumin 09/13/2022 4.70  3.50 - 5.20 g/dL Final   • Globulin 09/13/2022 2.2  gm/dL Final   • A/G Ratio 09/13/2022 2.1  g/dL Final   • Total Bilirubin 09/13/2022 0.3  0.0 - 1.2 mg/dL Final   • Alkaline Phosphatase 09/13/2022 63  39 - 117 U/L Final   • AST (SGOT) 09/13/2022 19  1 - 32 U/L Final   • ALT (SGPT) 09/13/2022 23  1 - 33 U/L Final   • WBC 09/13/2022 9.32  3.40 - 10.80 10*3/mm3 Final   • RBC 09/13/2022 4.61  3.77 - 5.28 10*6/mm3 Final   • Hemoglobin 09/13/2022 14.5  12.0 - 15.9 g/dL Final   • Hematocrit 09/13/2022 43.7  34.0 - 46.6 % Final   • MCV 09/13/2022 94.8  79.0 - 97.0 fL  Final   • MCH 09/13/2022 31.5  26.6 - 33.0 pg Final   • MCHC 09/13/2022 33.2  31.5 - 35.7 g/dL Final   • RDW 09/13/2022 12.1 (A) 12.3 - 15.4 % Final   • Platelets 09/13/2022 293  140 - 450 10*3/mm3 Final   • Neutrophil Rel % 09/13/2022 63.8  42.7 - 76.0 % Final   • Lymphocyte Rel % 09/13/2022 26.4  19.6 - 45.3 % Final   • Monocyte Rel % 09/13/2022 7.8  5.0 - 12.0 % Final   • Eosinophil Rel % 09/13/2022 1.2  0.3 - 6.2 % Final   • Basophil Rel % 09/13/2022 0.4  0.0 - 1.5 % Final   • Neutrophils Absolute 09/13/2022 5.94  1.70 - 7.00 10*3/mm3 Final   • Lymphocytes Absolute 09/13/2022 2.46  0.70 - 3.10 10*3/mm3 Final   • Monocytes Absolute 09/13/2022 0.73  0.10 - 0.90 10*3/mm3 Final   • Eosinophils Absolute 09/13/2022 0.11  0.00 - 0.40 10*3/mm3 Final   • Basophils Absolute 09/13/2022 0.04  0.00 - 0.20 10*3/mm3 Final   • Immature Granulocyte Rel % 09/13/2022 0.4  0.0 - 0.5 % Final   • Immature Grans Absolute 09/13/2022 0.04  0.00 - 0.05 10*3/mm3 Final   • nRBC 09/13/2022 0.0  0.0 - 0.2 /100 WBC Final        Assessment / Plan         Assessment   Diagnoses and all orders for this visit:    1. Fall, subsequent encounter (Primary)  2. Finger deformity  • Continue Tylenol and ibuprofen for pain management.  • Apply ice as tolerated and elevate the hand when possible on pillows.  • Previous x-ray of the wrist showed no acute fracture.  Will obtain x-ray of the right hand.  -     XR Hand 3+ View Right; Future    Health Maintenance  • Recommend annual diabetic foot and eye exam.     I spent 26 minutes caring for Iraida on this date of service. This time includes time spent by me in the following activities:preparing for the visit, reviewing tests and documenting information in the medical record    Follow Up   Return for Next scheduled follow up.    Patient was given instructions and counseling regarding her condition or for health maintenance advice. Please see specific information pulled into the AVS if appropriate.        This document has been electronically signed by ZITA Sanderson   October 3, 2022 12:15 EDT    Dictated Utilizing Dragon Dictation: Part of this note may be an electronic transcription/translation of spoken language to printed text using the Dragon Dictation System.

## 2022-10-03 NOTE — PATIENT INSTRUCTIONS
BMI for Adults  Body mass index (BMI) is a number that is calculated from a person's weight and height. In most adults, the number is used to find how much of an adult's weight is made up of fat. BMI is not as accurate as a direct measure of body fat.  HOW IS BMI CALCULATED?  BMI is calculated by dividing weight in kilograms by height in meters squared. It can also be calculated by dividing weight in pounds by height in inches squared, then multiplying the resulting number by 703. Charts are available to help you find your BMI quickly and easily without doing this calculation.   HOW IS BMI INTERPRETED?  Health care professionals use BMI charts to identify whether an adult is underweight, at a normal weight, or overweight based on the following guidelines:  Underweight: BMI less than 18.5.  Normal weight: BMI between 18.5 and 24.9.  Overweight: BMI between 25 and 29.9.  Obese: BMI of 30 and above.  BMI is usually interpreted the same for males and females.  Weight includes both fat and muscle, so someone with a muscular build, such as an athlete, may have a BMI that is higher than 24.9. In cases like these, BMI may not accurately depict body fat. To determine if excess body fat is the cause of a BMI of 25 or higher, further assessments may need to be done by a health care provider.  WHY IS BMI A USEFUL TOOL?  BMI is used to identify a possible weight problem that may be related to a medical problem or may increase the risk for medical problems. BMI can also be used to promote changes to reach a healthy weight.     This information is not intended to replace advice given to you by your health care provider. Make sure you discuss any questions you have with your health care provider.     Document Released: 08/29/2005 Document Revised: 01/08/2016 Document Reviewed: 05/15/2015  [employer] Interactive Patient Education ©2017 [employer]       Calorie Counting for Weight Loss  Calories are energy you get from the things  you eat and drink. Your body uses this energy to keep you going throughout the day. The number of calories you eat affects your weight. When you eat more calories than your body needs, your body stores the extra calories as fat. When you eat fewer calories than your body needs, your body burns fat to get the energy it needs.  Calorie counting means keeping track of how many calories you eat and drink each day. If you make sure to eat fewer calories than your body needs, you should lose weight. In order for calorie counting to work, you will need to eat the number of calories that are right for you in a day to lose a healthy amount of weight per week. A healthy amount of weight to lose per week is usually 1-2 lb (0.5-0.9 kg). A dietitian can determine how many calories you need in a day and give you suggestions on how to reach your calorie goal.   WHAT IS MY MY PLAN?  My goal is to have __________ calories per day.   If I have this many calories per day, I should lose around __________ pounds per week.  WHAT DO I NEED TO KNOW ABOUT CALORIE COUNTING?  In order to meet your daily calorie goal, you will need to:  Find out how many calories are in each food you would like to eat. Try to do this before you eat.  Decide how much of the food you can eat.  Write down what you ate and how many calories it had. Doing this is called keeping a food log.  WHERE DO I FIND CALORIE INFORMATION?  The number of calories in a food can be found on a Nutrition Facts label. Note that all the information on a label is based on a specific serving of the food. If a food does not have a Nutrition Facts label, try to look up the calories online or ask your dietitian for help.  HOW DO I DECIDE HOW MUCH TO EAT?  To decide how much of the food you can eat, you will need to consider both the number of calories in one serving and the size of one serving. This information can be found on the Nutrition Facts label. If a food does not have a Nutrition  Facts label, look up the information online or ask your dietitian for help.  Remember that calories are listed per serving. If you choose to have more than one serving of a food, you will have to multiply the calories per serving by the amount of servings you plan to eat. For example, the label on a package of bread might say that a serving size is 1 slice and that there are 90 calories in a serving. If you eat 1 slice, you will have eaten 90 calories. If you eat 2 slices, you will have eaten 180 calories.  HOW DO I KEEP A FOOD LOG?  After each meal, record the following information in your food log:  What you ate.  How much of it you ate.  How many calories it had.  Then, add up your calories.  Keep your food log near you, such as in a small notebook in your pocket. Another option is to use a mobile genet or website. Some programs will calculate calories for you and show you how many calories you have left each time you add an item to the log.  WHAT ARE SOME CALORIE COUNTING TIPS?  Use your calories on foods and drinks that will fill you up and not leave you hungry. Some examples of this include foods like nuts and nut butters, vegetables, lean proteins, and high-fiber foods (more than 5 g fiber per serving).  Eat nutritious foods and avoid empty calories. Empty calories are calories you get from foods or beverages that do not have many nutrients, such as candy and soda. It is better to have a nutritious high-calorie food (such as an avocado) than a food with few nutrients (such as a bag of chips).  Know how many calories are in the foods you eat most often. This way, you do not have to look up how many calories they have each time you eat them.  Look out for foods that may seem like low-calorie foods but are really high-calorie foods, such as baked goods, soda, and fat-free candy.  Pay attention to calories in drinks. Drinks such as sodas, specialty coffee drinks, alcohol, and juices have a lot of calories yet do  not fill you up. Choose low-calorie drinks like water and diet drinks.  Focus your calorie counting efforts on higher calorie items. Logging the calories in a garden salad that contains only vegetables is less important than calculating the calories in a milk shake.  Find a way of tracking calories that works for you. Get creative. Most people who are successful find ways to keep track of how much they eat in a day, even if they do not count every calorie.  WHAT ARE SOME PORTION CONTROL TIPS?  Know how many calories are in a serving. This will help you know how many servings of a certain food you can have.  Use a measuring cup to measure serving sizes. This is helpful when you start out. With time, you will be able to estimate serving sizes for some foods.  Take some time to put servings of different foods on your favorite plates, bowls, and cups so you know what a serving looks like.  Try not to eat straight from a bag or box. Doing this can lead to overeating. Put the amount you would like to eat in a cup or on a plate to make sure you are eating the right portion.  Use smaller plates, glasses, and bowls to prevent overeating. This is a quick and easy way to practice portion control. If your plate is smaller, less food can fit on it.  Try not to multitask while eating, such as watching TV or using your computer. If it is time to eat, sit down at a table and enjoy your food. Doing this will help you to start recognizing when you are full. It will also make you more aware of what and how much you are eating.  HOW CAN I CALORIE COUNT WHEN EATING OUT?  Ask for smaller portion sizes or child-sized portions.  Consider sharing an entree and sides instead of getting your own entree.  If you get your own entree, eat only half. Ask for a box at the beginning of your meal and put the rest of your entree in it so you are not tempted to eat it.  Look for the calories on the menu. If calories are listed, choose the lower  "calorie options.  Choose dishes that include vegetables, fruits, whole grains, low-fat dairy products, and lean protein. Focusing on smart food choices from each of the 5 food groups can help you stay on track at restaurants.  Choose items that are boiled, broiled, grilled, or steamed.  Choose water, milk, unsweetened iced tea, or other drinks without added sugars. If you want an alcoholic beverage, choose a lower calorie option. For example, a regular som can have up to 700 calories and a glass of wine has around 150.  Stay away from items that are buttered, battered, fried, or served with cream sauce. Items labeled \"crispy\" are usually fried, unless stated otherwise.  Ask for dressings, sauces, and syrups on the side. These are usually very high in calories, so do not eat much of them.  Watch out for salads. Many people think salads are a healthy option, but this is often not the case. Many salads come with gonzales, fried chicken, lots of cheese, fried chips, and dressing. All of these items have a lot of calories. If you want a salad, choose a garden salad and ask for grilled meats or steak. Ask for the dressing on the side, or ask for olive oil and vinegar or lemon to use as dressing.  Estimate how many servings of a food you are given. For example, a serving of cooked rice is ½ cup or about the size of half a tennis ball or one cupcake wrapper. Knowing serving sizes will help you be aware of how much food you are eating at restaurants. The list below tells you how big or small some common portion sizes are based on everyday objects.  1 oz--4 stacked dice.  3 oz--1 deck of cards.  1 tsp--1 dice.  1 Tbsp--½ a Ping-Pong ball.  2 Tbsp--1 Ping-Pong ball.  ½ cup--1 tennis ball or 1 cupcake wrapper.  1 cup--1 baseball.     This information is not intended to replace advice given to you by your health care provider. Make sure you discuss any questions you have with your health care provider.     Document Released: " 12/18/2006 Document Revised: 01/08/2016 Document Reviewed: 10/23/2014  [employer] Interactive Patient Education ©2017 [employer]

## 2022-10-05 ENCOUNTER — TELEPHONE (OUTPATIENT)
Dept: FAMILY MEDICINE CLINIC | Facility: CLINIC | Age: 47
End: 2022-10-05

## 2022-12-08 ENCOUNTER — OFFICE VISIT (OUTPATIENT)
Dept: FAMILY MEDICINE CLINIC | Facility: CLINIC | Age: 47
End: 2022-12-08

## 2022-12-08 VITALS
SYSTOLIC BLOOD PRESSURE: 120 MMHG | BODY MASS INDEX: 29.89 KG/M2 | HEART RATE: 108 BPM | OXYGEN SATURATION: 95 % | WEIGHT: 186 LBS | DIASTOLIC BLOOD PRESSURE: 84 MMHG | TEMPERATURE: 97.5 F | HEIGHT: 66 IN

## 2022-12-08 DIAGNOSIS — R51.9 ACUTE NONINTRACTABLE HEADACHE, UNSPECIFIED HEADACHE TYPE: ICD-10-CM

## 2022-12-08 DIAGNOSIS — J06.9 VIRAL UPPER RESPIRATORY TRACT INFECTION: Primary | ICD-10-CM

## 2022-12-08 LAB
EXPIRATION DATE: NORMAL
FLUAV RNA RESP QL NAA+PROBE: NOT DETECTED
FLUBV RNA RESP QL NAA+PROBE: NOT DETECTED
INTERNAL CONTROL: NORMAL
Lab: NORMAL
S PYO RRNA THROAT QL PROBE: NEGATIVE
SARS-COV-2 RNA RESP QL NAA+PROBE: NOT DETECTED

## 2022-12-08 PROCEDURE — 99213 OFFICE O/P EST LOW 20 MIN: CPT | Performed by: PHYSICIAN ASSISTANT

## 2022-12-08 PROCEDURE — 87651 STREP A DNA AMP PROBE: CPT | Performed by: PHYSICIAN ASSISTANT

## 2022-12-08 PROCEDURE — 87636 SARSCOV2 & INF A&B AMP PRB: CPT | Performed by: PHYSICIAN ASSISTANT

## 2022-12-08 PROCEDURE — 96372 THER/PROPH/DIAG INJ SC/IM: CPT | Performed by: PHYSICIAN ASSISTANT

## 2022-12-08 RX ORDER — IBUPROFEN 800 MG/1
800 TABLET ORAL EVERY 8 HOURS PRN
Qty: 30 TABLET | Refills: 0 | Status: SHIPPED | OUTPATIENT
Start: 2022-12-08 | End: 2023-01-16

## 2022-12-08 RX ORDER — AZELASTINE 1 MG/ML
SPRAY, METERED NASAL
Qty: 1 EACH | Refills: 5 | Status: SHIPPED | OUTPATIENT
Start: 2022-12-08 | End: 2023-01-16 | Stop reason: SDUPTHER

## 2022-12-08 RX ORDER — METHYLPREDNISOLONE 4 MG/1
TABLET ORAL
Qty: 21 TABLET | Refills: 0 | Status: SHIPPED | OUTPATIENT
Start: 2022-12-08 | End: 2023-01-16

## 2022-12-08 RX ORDER — KETOROLAC TROMETHAMINE 30 MG/ML
60 INJECTION, SOLUTION INTRAMUSCULAR; INTRAVENOUS ONCE
Status: COMPLETED | OUTPATIENT
Start: 2022-12-08 | End: 2022-12-08

## 2022-12-08 RX ADMIN — KETOROLAC TROMETHAMINE 60 MG: 30 INJECTION, SOLUTION INTRAMUSCULAR; INTRAVENOUS at 09:46

## 2022-12-08 NOTE — PROGRESS NOTES
Subjective        Chief Complaint  Nasal Congestion and Headache    Subjective      History of Present Illness  Iraida Heard is a 47 y.o. female who presents today to [facility] for Nasal Congestion and Headache. Past medical history is significant for type 2 diabetes mellitus, dyslipidemia, chronic allergic rhinitis, and chronic anxiety.    Nasal Congestion and Headache:  She reports 1 day of headache, tension in the left side of her neck, sore throat, and nasal drainage.  She has also had some pressure in her right ear.  Denies any fever or chills.  No myalgias, arthralgias, or other pain.  Her  was ill with strep pharyngitis 2 to 3 weeks ago.  No other known sick contacts, however because she has been in our office multiple times in the last 2 weeks for visits with family members.      Current Outpatient Medications:   •  acetaminophen (TYLENOL) 500 MG tablet, Take 500 mg by mouth Every 8 (Eight) Hours As Needed., Disp: , Rfl:   •  Blood Glucose Monitoring Suppl kit, 1 Device Daily., Disp: 1 each, Rfl: 0  •  Calcium Polycarbophil (FIBER LAXATIVE PO), Take  by mouth., Disp: , Rfl:   •  Cranberry 125 MG tablet, Take  by mouth Daily., Disp: , Rfl:   •  diazePAM (VALIUM) 5 MG tablet, 1 tablet 1 hour before schedule procedure, Disp: 1 tablet, Rfl: 0  •  dicyclomine (BENTYL) 20 MG tablet, Take 1 tablet by mouth 2 (Two) Times a Day., Disp: 180 tablet, Rfl: 3  •  fexofenadine-pseudoephedrine (ALLEGRA-D 24) 180-240 MG per 24 hr tablet, Take 1 tablet by mouth Daily As Needed for Allergies., Disp: 30 tablet, Rfl: 0  •  FLUoxetine (PROzac) 20 MG capsule, TAKE 1 CAPSULE EVERY MORNING, Disp: 90 capsule, Rfl: 3  •  montelukast (SINGULAIR) 10 MG tablet, TAKE 1 TABLET EVERY DAY, Disp: 90 tablet, Rfl: 3  •  omeprazole (priLOSEC) 20 MG capsule, TAKE 1 CAPSULE EVERY DAY, Disp: 90 capsule, Rfl: 3  •  ondansetron (ZOFRAN) 4 MG tablet, Take 1 tablet by mouth Every 8 (Eight) Hours As  "Needed for Nausea or Vomiting., Disp: 30 tablet, Rfl: 1  •  rosuvastatin (CRESTOR) 20 MG tablet, Take 1 tablet by mouth Daily., Disp: 90 tablet, Rfl: 1  •  Semaglutide,0.25 or 0.5MG/DOS, (Ozempic, 0.25 or 0.5 MG/DOSE,) 2 MG/1.5ML solution pen-injector, Inject 0.5 mg under the skin into the appropriate area as directed 1 (One) Time Per Week., Disp: 6 mL, Rfl: 3  •  azelastine (ASTELIN) 0.1 % nasal spray, 2 sprays both nostrils twice daily as needed, Disp: 1 each, Rfl: 5  •  glucose blood test strip, 1 each by Other route Daily. Use as instructed, Disp: 100 each, Rfl: 3  •  ibuprofen (ADVIL,MOTRIN) 800 MG tablet, Take 1 tablet by mouth Every 8 (Eight) Hours As Needed for Mild Pain., Disp: 30 tablet, Rfl: 0  •  Lancets Ultra Fine misc, 1 Device Daily., Disp: 100 each, Rfl: 3  No current facility-administered medications for this visit.      No Known Allergies    Objective     Objective   Vital Signs:  Blood Pressure 120/84   Pulse 108   Temperature 97.5 °F (36.4 °C) (Temporal)   Height 167.6 cm (65.98\")   Weight 84.4 kg (186 lb)   Oxygen Saturation 95%   Body Mass Index 30.04 kg/m²   Estimated body mass index is 30.04 kg/m² as calculated from the following:    Height as of this encounter: 167.6 cm (65.98\").    Weight as of this encounter: 84.4 kg (186 lb).        Past Medical History:   Diagnosis Date   • Arthritis    • Hyperlipidemia    • Hypertension 2/9/2021   • Type 2 diabetes mellitus, without long-term current use of insulin (HCC) 3/29/2022     Past Surgical History:   Procedure Laterality Date   • APPENDECTOMY     • CHOLECYSTECTOMY     • EXCISION BENIGN SKIN LESION SCALP / NECK / HANDS / FEET / GENITALIA     • GALLBLADDER SURGERY     • HYSTERECTOMY  2015    GELY/RSO for benign disease   • SINUS SURGERY     • TONSILLECTOMY       Social History     Socioeconomic History   • Marital status:    Tobacco Use   • Smoking status: Never   • Smokeless tobacco: Never   Vaping Use   • Vaping Use: Never used "   Substance and Sexual Activity   • Alcohol use: Never   • Drug use: Never   • Sexual activity: Defer      Physical Exam  Vitals and nursing note reviewed.   Constitutional:       General: She is not in acute distress.     Appearance: She is well-developed. She is not diaphoretic.   HENT:      Head: Normocephalic and atraumatic.      Ears:      Comments: Right side serous effusion.      Nose: Congestion and rhinorrhea present.      Mouth/Throat:      Comments: +Postnasal drip.   Eyes:      General: No scleral icterus.        Right eye: No discharge.         Left eye: No discharge.      Conjunctiva/sclera: Conjunctivae normal.   Cardiovascular:      Rate and Rhythm: Normal rate and regular rhythm.      Heart sounds: Normal heart sounds. No murmur heard.    No friction rub. No gallop.   Pulmonary:      Effort: Pulmonary effort is normal. No respiratory distress.      Breath sounds: Normal breath sounds. No wheezing or rales.   Chest:      Chest wall: No tenderness.   Musculoskeletal:         General: Normal range of motion.      Cervical back: Normal range of motion and neck supple. No rigidity or tenderness.   Lymphadenopathy:      Cervical: No cervical adenopathy.   Skin:     General: Skin is warm and dry.      Coloration: Skin is not pale.      Findings: No erythema or rash.   Neurological:      Mental Status: She is alert and oriented to person, place, and time.   Psychiatric:         Behavior: Behavior normal.          Result Review :  The following data was reviewed by: ZITA Sanderson on 12/08/2022:     Rapid strep screen: Negative.         Assessment / Plan         Assessment   Diagnoses and all orders for this visit:    1. Viral upper respiratory tract infection (Primary)  • Rapid strep screen negative.   • Testing sent for influenza/covid, will notify patient of results when available.   • Continue supportive care with rest, adequate oral fluid intake, symptomatic care.   • She takes allegra with  pseudoephedrine intermittently without any known side effects. Encouraged her to monitor her HR as it is mildly elevated in the office today.   • Add azelastine nasal spray.   • Medrol dose pack, use as directed. Hold until covid/flu have resulted.   • Return to clinic if no improvement noted or if symptoms are worsening.     -     azelastine (ASTELIN) 0.1 % nasal spray; 2 sprays both nostrils twice daily as needed  Dispense: 1 each; Refill: 5    2. Acute nonintractable headache, unspecified headache type  • She received a dose of Toradol 60 mg IM x1 in the office today.  • If persistent headache, can take ibuprofen 800 mg every 8 hours as needed and can alternate with Tylenol.  -     ketorolac (TORADOL) injection 60 mg  -     ibuprofen (ADVIL,MOTRIN) 800 MG tablet; Take 1 tablet by mouth Every 8 (Eight) Hours As Needed for Mild Pain.  Dispense: 30 tablet; Refill: 0       New Medications Ordered This Visit   Medications   • ketorolac (TORADOL) injection 60 mg   • ibuprofen (ADVIL,MOTRIN) 800 MG tablet     Sig: Take 1 tablet by mouth Every 8 (Eight) Hours As Needed for Mild Pain.     Dispense:  30 tablet     Refill:  0   • azelastine (ASTELIN) 0.1 % nasal spray     Si sprays both nostrils twice daily as needed     Dispense:  1 each     Refill:  5     Follow Up   Return if symptoms worsen or fail to improve, for Next scheduled follow up.    Patient was given instructions and counseling regarding her condition or for health maintenance advice. Please see specific information pulled into the AVS if appropriate.       This document has been electronically signed by ZITA Sanderson   2022 09:47 EST    Dictated Utilizing Dragon Dictation: Part of this note may be an electronic transcription/translation of spoken language to printed text using the Dragon Dictation System.

## 2022-12-13 RX ORDER — OMEPRAZOLE 20 MG/1
CAPSULE, DELAYED RELEASE ORAL
Qty: 90 CAPSULE | Refills: 3 | Status: SHIPPED | OUTPATIENT
Start: 2022-12-13 | End: 2022-12-17 | Stop reason: SDUPTHER

## 2022-12-17 DIAGNOSIS — E78.2 MIXED HYPERLIPIDEMIA: ICD-10-CM

## 2022-12-17 RX ORDER — ROSUVASTATIN CALCIUM 20 MG/1
20 TABLET, COATED ORAL DAILY
Qty: 90 TABLET | Refills: 1 | Status: SHIPPED | OUTPATIENT
Start: 2022-12-17 | End: 2023-01-16 | Stop reason: SDUPTHER

## 2022-12-17 RX ORDER — OMEPRAZOLE 20 MG/1
20 CAPSULE, DELAYED RELEASE ORAL DAILY
Qty: 90 CAPSULE | Refills: 3 | Status: SHIPPED | OUTPATIENT
Start: 2022-12-17 | End: 2023-01-16 | Stop reason: SDUPTHER

## 2023-01-13 ENCOUNTER — LAB (OUTPATIENT)
Dept: FAMILY MEDICINE CLINIC | Facility: CLINIC | Age: 48
End: 2023-01-13
Payer: MEDICARE

## 2023-01-13 DIAGNOSIS — E78.2 MIXED HYPERLIPIDEMIA: ICD-10-CM

## 2023-01-13 DIAGNOSIS — M54.59 MECHANICAL LOW BACK PAIN: ICD-10-CM

## 2023-01-13 DIAGNOSIS — R53.83 OTHER FATIGUE: ICD-10-CM

## 2023-01-13 DIAGNOSIS — I10 ESSENTIAL HYPERTENSION: Primary | ICD-10-CM

## 2023-01-13 DIAGNOSIS — Z00.00 HEALTHCARE MAINTENANCE: ICD-10-CM

## 2023-01-13 DIAGNOSIS — E55.9 VITAMIN D DEFICIENCY: ICD-10-CM

## 2023-01-13 DIAGNOSIS — E11.65 TYPE 2 DIABETES MELLITUS WITH HYPERGLYCEMIA, WITHOUT LONG-TERM CURRENT USE OF INSULIN: ICD-10-CM

## 2023-01-13 DIAGNOSIS — E11.9 TYPE 2 DIABETES MELLITUS WITHOUT COMPLICATION, WITHOUT LONG-TERM CURRENT USE OF INSULIN: ICD-10-CM

## 2023-01-14 LAB
25(OH)D3+25(OH)D2 SERPL-MCNC: 19.2 NG/ML (ref 30–100)
ALBUMIN SERPL-MCNC: 4.2 G/DL (ref 3.5–5.2)
ALBUMIN/GLOB SERPL: 2 G/DL
ALP SERPL-CCNC: 54 U/L (ref 39–117)
ALT SERPL-CCNC: 39 U/L (ref 1–33)
AST SERPL-CCNC: 18 U/L (ref 1–32)
BASOPHILS # BLD AUTO: 0.02 10*3/MM3 (ref 0–0.2)
BASOPHILS NFR BLD AUTO: 0.2 % (ref 0–1.5)
BILIRUB SERPL-MCNC: 0.4 MG/DL (ref 0–1.2)
BUN SERPL-MCNC: 16 MG/DL (ref 6–20)
BUN/CREAT SERPL: 20 (ref 7–25)
CALCIUM SERPL-MCNC: 8.7 MG/DL (ref 8.6–10.5)
CHLORIDE SERPL-SCNC: 100 MMOL/L (ref 98–107)
CHOLEST SERPL-MCNC: 182 MG/DL (ref 0–200)
CO2 SERPL-SCNC: 28 MMOL/L (ref 22–29)
CREAT SERPL-MCNC: 0.8 MG/DL (ref 0.57–1)
EGFRCR SERPLBLD CKD-EPI 2021: 92.2 ML/MIN/1.73
EOSINOPHIL # BLD AUTO: 0.06 10*3/MM3 (ref 0–0.4)
EOSINOPHIL NFR BLD AUTO: 0.6 % (ref 0.3–6.2)
ERYTHROCYTE [DISTWIDTH] IN BLOOD BY AUTOMATED COUNT: 13 % (ref 12.3–15.4)
GLOBULIN SER CALC-MCNC: 2.1 GM/DL
GLUCOSE SERPL-MCNC: 220 MG/DL (ref 65–99)
HBA1C MFR BLD: 8.4 % (ref 4.8–5.6)
HCT VFR BLD AUTO: 43.2 % (ref 34–46.6)
HDLC SERPL-MCNC: 66 MG/DL (ref 40–60)
HGB BLD-MCNC: 13.8 G/DL (ref 12–15.9)
IMM GRANULOCYTES # BLD AUTO: 0.06 10*3/MM3 (ref 0–0.05)
IMM GRANULOCYTES NFR BLD AUTO: 0.6 % (ref 0–0.5)
LDLC SERPL CALC-MCNC: 95 MG/DL (ref 0–100)
LYMPHOCYTES # BLD AUTO: 2.06 10*3/MM3 (ref 0.7–3.1)
LYMPHOCYTES NFR BLD AUTO: 22.1 % (ref 19.6–45.3)
MCH RBC QN AUTO: 31.4 PG (ref 26.6–33)
MCHC RBC AUTO-ENTMCNC: 31.9 G/DL (ref 31.5–35.7)
MCV RBC AUTO: 98.2 FL (ref 79–97)
MICROALBUMIN UR-MCNC: 18.1 UG/ML
MONOCYTES # BLD AUTO: 1.14 10*3/MM3 (ref 0.1–0.9)
MONOCYTES NFR BLD AUTO: 12.2 % (ref 5–12)
NEUTROPHILS # BLD AUTO: 5.98 10*3/MM3 (ref 1.7–7)
NEUTROPHILS NFR BLD AUTO: 64.3 % (ref 42.7–76)
NRBC BLD AUTO-RTO: 0 /100 WBC (ref 0–0.2)
PLATELET # BLD AUTO: 246 10*3/MM3 (ref 140–450)
POTASSIUM SERPL-SCNC: 4.2 MMOL/L (ref 3.5–5.2)
PROT SERPL-MCNC: 6.3 G/DL (ref 6–8.5)
RBC # BLD AUTO: 4.4 10*6/MM3 (ref 3.77–5.28)
SODIUM SERPL-SCNC: 136 MMOL/L (ref 136–145)
TRIGL SERPL-MCNC: 121 MG/DL (ref 0–150)
TSH SERPL DL<=0.005 MIU/L-ACNC: 2.06 UIU/ML (ref 0.27–4.2)
VIT B12 SERPL-MCNC: 636 PG/ML (ref 211–946)
VLDLC SERPL CALC-MCNC: 21 MG/DL (ref 5–40)
WBC # BLD AUTO: 9.32 10*3/MM3 (ref 3.4–10.8)

## 2023-01-16 ENCOUNTER — OFFICE VISIT (OUTPATIENT)
Dept: FAMILY MEDICINE CLINIC | Facility: CLINIC | Age: 48
End: 2023-01-16
Payer: MEDICARE

## 2023-01-16 VITALS
OXYGEN SATURATION: 97 % | HEIGHT: 66 IN | SYSTOLIC BLOOD PRESSURE: 126 MMHG | WEIGHT: 192 LBS | HEART RATE: 64 BPM | BODY MASS INDEX: 30.86 KG/M2 | TEMPERATURE: 97.7 F | DIASTOLIC BLOOD PRESSURE: 68 MMHG | RESPIRATION RATE: 14 BRPM

## 2023-01-16 DIAGNOSIS — Z00.00 HEALTHCARE MAINTENANCE: ICD-10-CM

## 2023-01-16 DIAGNOSIS — I10 ESSENTIAL HYPERTENSION: ICD-10-CM

## 2023-01-16 DIAGNOSIS — G47.33 OSA (OBSTRUCTIVE SLEEP APNEA): ICD-10-CM

## 2023-01-16 DIAGNOSIS — M54.59 MECHANICAL LOW BACK PAIN: ICD-10-CM

## 2023-01-16 DIAGNOSIS — J30.9 CHRONIC ALLERGIC RHINITIS: Primary | ICD-10-CM

## 2023-01-16 DIAGNOSIS — F41.0 PANIC ATTACKS: ICD-10-CM

## 2023-01-16 DIAGNOSIS — K21.9 GASTROESOPHAGEAL REFLUX DISEASE WITHOUT ESOPHAGITIS: ICD-10-CM

## 2023-01-16 DIAGNOSIS — E78.2 MIXED HYPERLIPIDEMIA: ICD-10-CM

## 2023-01-16 DIAGNOSIS — R23.2 HOT FLASHES: ICD-10-CM

## 2023-01-16 DIAGNOSIS — I47.29 NONSUSTAINED VENTRICULAR TACHYCARDIA: ICD-10-CM

## 2023-01-16 DIAGNOSIS — E11.9 TYPE 2 DIABETES MELLITUS WITHOUT COMPLICATION, WITHOUT LONG-TERM CURRENT USE OF INSULIN: ICD-10-CM

## 2023-01-16 PROCEDURE — 3052F HG A1C>EQUAL 8.0%<EQUAL 9.0%: CPT | Performed by: GENERAL PRACTICE

## 2023-01-16 PROCEDURE — 1159F MED LIST DOCD IN RCRD: CPT | Performed by: GENERAL PRACTICE

## 2023-01-16 PROCEDURE — 1170F FXNL STATUS ASSESSED: CPT | Performed by: GENERAL PRACTICE

## 2023-01-16 PROCEDURE — 96160 PT-FOCUSED HLTH RISK ASSMT: CPT | Performed by: GENERAL PRACTICE

## 2023-01-16 PROCEDURE — G0439 PPPS, SUBSEQ VISIT: HCPCS | Performed by: GENERAL PRACTICE

## 2023-01-16 RX ORDER — OMEPRAZOLE 20 MG/1
20 CAPSULE, DELAYED RELEASE ORAL DAILY
Qty: 90 CAPSULE | Refills: 3 | Status: SHIPPED | OUTPATIENT
Start: 2023-01-16

## 2023-01-16 RX ORDER — MONTELUKAST SODIUM 10 MG/1
10 TABLET ORAL DAILY
Qty: 90 TABLET | Refills: 3 | Status: SHIPPED | OUTPATIENT
Start: 2023-01-16

## 2023-01-16 RX ORDER — SEMAGLUTIDE 1.34 MG/ML
INJECTION, SOLUTION SUBCUTANEOUS
Qty: 4.5 ML | Refills: 3 | Status: SHIPPED | OUTPATIENT
Start: 2023-01-16

## 2023-01-16 RX ORDER — FLUOXETINE HYDROCHLORIDE 20 MG/1
20 CAPSULE ORAL EVERY MORNING
Qty: 90 CAPSULE | Refills: 3 | Status: SHIPPED | OUTPATIENT
Start: 2023-01-16

## 2023-01-16 RX ORDER — AZELASTINE 1 MG/ML
SPRAY, METERED NASAL
Qty: 1 EACH | Refills: 5 | Status: SHIPPED | OUTPATIENT
Start: 2023-01-16

## 2023-01-16 RX ORDER — ROSUVASTATIN CALCIUM 20 MG/1
20 TABLET, COATED ORAL DAILY
Qty: 90 TABLET | Refills: 3 | Status: SHIPPED | OUTPATIENT
Start: 2023-01-16

## 2023-01-16 RX ORDER — DICYCLOMINE HCL 20 MG
20 TABLET ORAL 2 TIMES DAILY
Qty: 180 TABLET | Refills: 3 | Status: SHIPPED | OUTPATIENT
Start: 2023-01-16

## 2023-01-16 NOTE — PATIENT INSTRUCTIONS
Advance Directive  Advance directives are legal documents that allow you to make decisions about your health care and medical treatment in case you become unable to communicate for yourself. Advance directives let your wishes be known to family, friends, and health care providers.  Discussing and writing advance directives should happen over time rather than all at once. Advance directives can be changed and updated at any time. There are different types of advance directives, such as:  Medical power of .  Living will.  Do not resuscitate (DNR) order or do not attempt resuscitation (DNAR) order.  Health care proxy and medical power of   A health care proxy is also called a health care agent. This person is appointed to make medical decisions for you when you are unable to make decisions for yourself. Generally, people ask a trusted friend or family member to act as their proxy and represent their preferences. Make sure you have an agreement with your trusted person to act as your proxy. A proxy may have to make a medical decision on your behalf if your wishes are not known.  A medical power of , also called a durable power of  for health care, is a legal document that names your health care proxy. Depending on the laws in your state, the document may need to be:  Signed.  Notarized.  Dated.  Copied.  Witnessed.  Incorporated into your medical record.  You may also want to appoint a trusted person to manage your money in the event you are unable to do so. This is called a durable power of  for finances. It is a separate legal document from the durable power of  for health care. You may choose your health care proxy or someone different to act as your agent in money matters.  If you do not appoint a proxy, or there is a concern that the proxy is not acting in your best interest, a court may appoint a guardian to act on your behalf.  Living will  A living will is a set of  instructions that state your wishes about medical care when you cannot express them yourself. Health care providers should keep a copy of your living will in your medical record. You may want to give a copy to family members or friends. To alert caregivers in case of an emergency, you can place a card in your wallet to let them know that you have a living will and where they can find it. A living will is used if you become:  Terminally ill.  Disabled.  Unable to communicate or make decisions.  The following decisions should be included in your living will:  To use or not to use life support equipment, such as dialysis machines and breathing machines (ventilators).  Whether you want a DNR or DNAR order. This tells health care providers not to use cardiopulmonary resuscitation (CPR) if breathing or heartbeat stops.  To use or not to use tube feeding.  To be given or not to be given food and fluids.  Whether you want comfort (palliative) care when the goal becomes comfort rather than a cure.  Whether you want to donate your organs and tissues.  A living will does not give instructions for distributing your money and property if you should pass away.  DNR or DNAR  A DNR or DNAR order is a request not to have CPR in the event that your heart stops beating or you stop breathing. If a DNR or DNAR order has not been made and shared, a health care provider will try to help any patient whose heart has stopped or who has stopped breathing. If you plan to have surgery, talk with your health care provider about how your DNR or DNAR order will be followed if problems occur.  What if I do not have an advance directive?  Some states assign family decision makers to act on your behalf if you do not have an advance directive. Each state has its own laws about advance directives. You may want to check with your health care provider, , or state representative about the laws in your state.  Summary  Advance directives are legal  documents that allow you to make decisions about your health care and medical treatment in case you become unable to communicate for yourself.  The process of discussing and writing advance directives should happen over time. You can change and update advance directives at any time.  Advance directives may include a medical power of , a living will, and a DNR or DNAR order.  This information is not intended to replace advice given to you by your health care provider. Make sure you discuss any questions you have with your health care provider.  Document Revised: 09/21/2021 Document Reviewed: 09/21/2021  [employer] Patient Education © 2022 [employer]  Heart Disease Prevention  Heart disease is the leading cause of death in the world. Coronary artery disease is the most common cause of heart disease. This condition results when cholesterol and other substances (plaque) build up inside the walls of the blood vessels that supply your heart muscle (arteries). This buildup in arteries is called atherosclerosis. You can take actions to lower your risk of heart disease.  How can heart disease affect me?  Heart disease can cause many unpleasant symptoms and complications, such as:  Chest pain (angina).  Reduced or blocked blood flow to your heart. This can cause:  Irregular heartbeats (arrhythmias).  Heart attack.  Heart failure.  What can increase my risk?  The following factors may make you more likely to develop this condition:  High blood pressure (hypertension).  High cholesterol.  A diet high in saturated fats or trans fats.  Obesity.  Diabetes.  Having a family history of heart disease.  Certain lifestyle factors, including:  Smoking.  Lack of physical activity.  Drinking too much alcohol.  What actions can I take to prevent heart disease?  Nutrition    Follow a heart-healthy eating plan as told by your health care provider. Examples include the DASH eating plan. DASH stands for Dietary Approaches to Stop  Hypertension.  Generally, it is recommended that you:  Eat less salt (sodium). Ask your health care provider how much sodium is safe for you. Most people should have less than 2,300 mg each day.  Limit unhealthy fats, such as saturated and trans fats, in your diet. You can do this by eating low-fat dairy products, eating less red meat, and avoiding processed foods.  Eat healthy fats (omega-3 fatty acids). These are found in fish, such as mackerel or salmon.  Eat more fruits and vegetables. You should try to fill one-half of your plate with fruits and vegetables at each meal.  Eat more whole grains.  Avoid foods and drinks that have added sugars. Try to limit how much added sugar you have to:  Less than 25 grams a day for women.  Less than 36 grams a day for men.  Lifestyle    Get regular exercise. This is one of the most important things you can do for your health. Generally, it is recommended that you:  Exercise for at least 30 minutes on most days of the week (150 minutes each week). This should be exercise that causes your heart to beat faster (aerobic exercise).  Add strength exercises on at least 2 days each week.  Do not use any products that contain nicotine or tobacco. These products include cigarettes, chewing tobacco, and vaping devices, such as e-cigarettes. These can damage your heart and blood vessels. If you need help quitting, ask your health care provider.  Alcohol use  Do not drink alcohol if:  Your health care provider tells you not to drink.  You are pregnant, may be pregnant, or are planning to become pregnant.  If you drink alcohol:  Limit how much you have to:  0-1 drink a day for women.  0-2 drinks a day for men.  Know how much alcohol is in your drink. In the [place] one drink equals one 12 oz bottle of beer (355 mL), one 5 oz glass of wine (148 mL), or one 1½ oz glass of hard liquor (44 mL).  Medicines  Take over-the-counter and prescription medicines only as told by your health care  provider.  Work with your health care provider to find out whether it is safe and beneficial for you to take aspirin daily. Make sure that you understand how much to take and what form to take.  Depending on your risk factors, your health care provider may prescribe medicines to lower your risk of heart disease or to control related conditions. You may take medicine to:  Lower cholesterol.  Control blood pressure.  Control diabetes.  General information  Keep your blood pressure under control, as recommended by your health care provider. For most healthy people, the upper number of their blood pressure (systolic) should be no higher than 120, and the lower number (diastolic) no higher than 80. Treatment may be needed if your blood pressure is higher than 130/80.  Have your blood pressure checked at least every 2 years. Your health care provider may check your blood pressure more often if you have high blood pressure.  After age 20, have your cholesterol checked every 4-6 years. If you have risk factors for heart disease, you may need to have it checked more often. Treatment may be needed if your cholesterol is high.  Have your body mass index (BMI) checked every year. Your health care provider can calculate your BMI from your height and weight.  Check your waist circumference. It should be:  No more than 35 inches (89 cm) for women who are not pregnant.  No more than 40 inches (102 cm) for men.  Work with your health care provider to lose weight, if needed, or to maintain a healthy weight.  Where to find more information:  Centers for Disease Control and Prevention: www.cdc.gov/heartdisease  American Heart Association: www.heart.org  Summary  Heart disease is the leading cause of death in the world.  Heart disease can cause chest pain, abnormal heart rhythms, heart attack, and heart failure.  Some of the risk factors for heart disease include high blood pressure, high cholesterol, and smoking.  You can take actions  to lower your chances of developing heart disease. Work with your health care provider to reduce your risk by following a heart-healthy diet, being physically active, and controlling your weight, blood pressure, and cholesterol level.  This information is not intended to replace advice given to you by your health care provider. Make sure you discuss any questions you have with your health care provider.  Document Revised: 08/17/2022 Document Reviewed: 08/17/2022  [employer] Patient Education © 2022 [employer]  Mammogram  A mammogram is an X-ray of the breasts. This procedure can screen for and detect any changes that may indicate breast cancer. Mammograms are regularly done beginning at age 40 for women with average risk. A man may have a mammogram if he has a lump or swelling in his breast tissue.  A mammogram can also identify other changes and variations in the breast, such as:  Inflammation of the breast tissue (mastitis).  An infected area that contains a collection of pus (abscess).  A fluid-filled sac (cyst).  Tumors that are not cancerous (benign).  Fibrocystic changes. This is when breast tissue becomes denser and can make the tissue feel rope-like or uneven under the skin.  Women at higher risk for breast cancer need earlier and more comprehensive screening for abnormal changes. Breast tomosynthesis, or three-dimensional (3D) mammography, and digital breast tomosynthesis are advanced forms of imaging that create 3D pictures of the breasts.  Tell a health care provider:  About any allergies you have.  If you have breast implants.  If you have had previous breast disease, biopsy, or surgery.  If you have a family history of breast cancer.  If you are breastfeeding.  Whether you are pregnant or may be pregnant.  What are the risks?  Generally, this is a safe procedure. However, problems may occur, including:  Exposure to radiation. Radiation levels are very low with this test.  The need for more tests.  The  mammogram fails to detect certain cancers or the results are misinterpreted.  Difficulty with detecting breast cancer in women with dense breasts.  What happens before the procedure?  Schedule your test about 1-2 weeks after your menstrual period if you are menstruating. This is usually when your breasts are the least tender.  If you have had a mammogram done at a different facility in the past, get the mammogram X-rays or have them sent to your current exam facility. The new and old images will be compared.  Wash your breasts and underarms on the day of the test.  Do not wear deodorants, perfumes, lotions, or powders anywhere on your body on the day of the test.  Remove any jewelry from your neck.  Wear clothes that you can change into and out of easily.  What happens during the procedure?    You will undress from the waist up and put on a gown that opens in the front.  You will  front of the X-ray machine.  Each breast will be placed between two plastic or glass plates. The plates will compress your breast for a few seconds. Try to stay as relaxed as possible. This procedure does not cause any harm to your breasts. Any discomfort you feel will be very brief.  X-rays will be taken from different angles of each breast.  The procedure may vary among health care providers and hospitals.  What can I expect after the procedure?  The mammogram will be examined by a specialist (radiologist).  You may need to repeat certain parts of the test, depending on the quality of the images. This is done if the radiologist needs a better view of the breast tissue.  You may resume your normal activities.  It is up to you to get the results of your procedure. Ask your health care provider, or the department that is doing the procedure, when your results will be ready.  Summary  A mammogram is an X-ray of the breasts. This procedure can screen for and detect any changes that may indicate breast cancer.  A man may have a  mammogram if he has a lump or swelling in his breast tissue.  If you have had a mammogram done at a different facility in the past, get the mammogram X-rays or have them sent to your current exam facility in order to compare them.  Schedule your test about 1-2 weeks after your menstrual period if you are menstruating.  Ask when your test results will be ready. Make sure you get your test results.  This information is not intended to replace advice given to you by your health care provider. Make sure you discuss any questions you have with your health care provider.  Document Revised: 08/31/2022 Document Reviewed: 10/18/2021  [employer] Patient Education © 2022 [employer]  Colorectal Cancer Screening  Colorectal cancer screening is a group of tests that are used to check for colorectal cancer before symptoms develop. Colorectal refers to the colon and rectum. The colon and rectum are located at the end of the digestive tract and carry stool (feces) out of the body.  Who should have screening?  All adults who are 45-75 years old should have screening. Your health care provider may recommend screening before age 45. You will have tests every 1-10 years, depending on your results and the type of screening test. Screening recommendations for adults who are 76-85 years old vary depending on a person's health. People older than age 85 should no longer get colorectal cancer screening.  You may have screening tests starting before age 45, or more often than other people, if you have any of these risk factors:  A personal or family history of colorectal cancer or abnormal growths known as polyps in your colon.  Inflammatory bowel disease, such as ulcerative colitis or Crohn's disease.  A history of having radiation treatment to the abdomen or the area between the hip bones (pelvic area) for cancer.  A type of genetic syndrome that is passed from parent to child (hereditary), such as:  De Leon syndrome.  Familial adenomatous  polyposis.  Turcot syndrome.  Peutz-Jeghers syndrome.  MUTYH-associated polyposis (MAP).  A personal history of diabetes.  Types of tests  There are several types of colorectal screening tests. You may have one or more of the following:  Guaiac-based fecal occult blood testing. For this test, a stool sample is checked for hidden (occult) blood, which could be a sign of colorectal cancer.  Fecal immunochemical test (FIT). For this test, a stool sample is checked for blood, which could be a sign of colorectal cancer.  Stool DNA test. For this test, a stool sample is checked for blood and changes in DNA that could lead to colorectal cancer.  Sigmoidoscopy. During this test, a thin, flexible tube with a camera on the end, called a sigmoidoscope, is used to examine the rectum and the lower colon.  Colonoscopy. During this test, a long, flexible tube with a camera on the end, called a colonoscope, is used to examine the entire colon and rectum. Also, sometimes a tissue sample is taken to be looked at under a microscope (biopsy) or small polyps are removed during this test.  Virtual colonoscopy. Instead of a colonoscope, this type of colonoscopy uses a CT scan to take pictures of the colon and rectum. A CT scan is a type of X-ray that is made using computers.  What are the benefits of screening?  Screening reduces your risk for colorectal cancer and can help identify cancer at an early stage, when the cancer can be removed or treated more easily. It is common for polyps to form in the lining of the colon, especially as you age. These polyps may be cancerous or become cancerous over time. Screening can identify these polyps.  What are the risks of screening?  Generally, these are safe tests. However, problems may occur, including:  The need for more tests to confirm results from a stool sample test. Stool sample tests have fewer risks than other types of screening tests.  Being exposed to low levels of radiation, if you had  a test involving X-rays. This may slightly increase your cancer risk. The benefit of detecting cancer outweighs the slight increase in risk.  Bleeding, damage to the intestine, or infection caused by a sigmoidoscopy or colonoscopy.  A reaction to medicines given during a sigmoidoscopy or colonoscopy.  Talk with your health care provider to understand your risk for colorectal cancer and to make a screening plan that is right for you.  Questions to ask your health care provider  When should I start colorectal cancer screening?  What is my risk for colorectal cancer?  How often do I need screening?  Which screening tests do I need?  How do I get my test results?  What do my results mean?  Where to find more information  Learn more about colorectal cancer screening from:  The American Cancer Society: cancer.org  National Cancer [place]: cancer.gov  Summary  Colorectal cancer screening is a group of tests used to check for colorectal cancer before symptoms develop.  All adults who are 45-75 years old should have screening. Your health care provider may recommend screening before age 45.  You may have screening tests starting before age 45, or more often than other people, if you have certain risk factors.  Screening reduces your risk for colorectal cancer and can help identify cancer at an early stage, when the cancer can be removed or treated more easily.  Talk with your health care provider to understand your risk for colorectal cancer and to make a screening plan that is right for you.  This information is not intended to replace advice given to you by your health care provider. Make sure you discuss any questions you have with your health care provider.  Document Revised: 04/07/2021 Document Reviewed: 04/07/2021  [employer] Patient Education © 2022 [employer]

## 2023-01-16 NOTE — PROGRESS NOTES
The ABCs of the Annual Wellness Visit  Subsequent Medicare Wellness Visit    Chief Complaint  Subsequent Medicare Wellness Visit    Subjective    History of Present Illness:  Iraida Heard is a 47 y.o. female who presents for a Subsequent Medicare Wellness Visit.    Type 2 Diabetes Mellitus  She has not been following her diet and exercise plan as closely through winter.  She was unable to tolerate more than 0.5 mg of semaglutide weekly.  She ran out of this several months ago.  She is no longer on metformin  Lab Results   Component Value Date    HGBA1C 8.40 (H) 01/13/2023     Lab Results   Component Value Date    MICROALBUR 18.1 01/13/2023     Lab Results   Component Value Date    [id number] 636 01/13/2023      Hyperlipidemia  She remains on rosuvastatin with no apparent side effects.  Lab Results   Component Value Date    CHLPL 182 01/13/2023    TRIG 121 01/13/2023    HDL 66 (H) 01/13/2023    LDL 95 01/13/2023     Essential Hypertension  She has been off medication for some time.  She denies any chest pain, palpitations, shortness of breath, lightheadedness, or lower extremity edema    Chronic Allergic Rhinitis  She gives a long history of intermittent nasal congestion and postnasal drip.  She continues to deny any other upper respiratory tract symptoms and there is no history of any fever or chills. She remains on montelukast, and nasal azelastine with no apparent side effects.  She discontinued nasal fluticasone due to irritation.  She gives a history of previous immunotherapy and has also had sinus surgery twice.      Gastroesophageal Reflux Disease  She remains heartburn free on omeprazole.  She admits to occasional diarrhea but denies any difficulty swallowing, nausea, vomiting, constipation, hematochezia, or melena.    Panic Attacks  She admits to intermittent episodes of nervousness, worrying, flushing, and diaphoresis.  She denies persistent anxiety and there is no history of depression, loss of interest  in activities, or suicide ideation.  She remains on fluoxetine  Lab Results   Component Value Date    TSH 2.060 01/13/2023     Chronic Low Back Pain  She gives a 4 to 5 year history of intermittent low back pain.  This has been worse over the last year.  There is no history of any recent strain or trauma nor any change in her activities.  The pain is described as a bilateral lower ache.  The pain does not radiate elsewhere at present and she continues to deny any weakness, numbness, tingling, or any change in her bowel/bladder control.  The pain increases with activity improves with rest. She has seen both a physical therapist and a chiropractor in the past with little improvement in her symptoms.  She was followed by pain management and underwent several injections with only a transient improvement each time.  MRI of the lumbar spine performed on 8/8/2022 was reported as showing a small right disc herniation at L4-5 with moderate neuroforaminal stenosis    Labs  Most recent vitamin D 19.2  Lab Results   Component Value Date    WBC 9.32 01/13/2023    HGB 13.8 01/13/2023    HCT 43.2 01/13/2023    MCV 98.2 (H) 01/13/2023     01/13/2023     Lab Results   Component Value Date    GLUCOSE 220 (H) 01/13/2023    BUN 16 01/13/2023    CREATININE 0.80 01/13/2023    EGFRIFNONA 76 05/03/2021    EGFRIFNONA 75 05/03/2021    EGFRIFAFRI 93 05/03/2021    EGFRIFAFRI 91 05/03/2021    BCR 20.0 01/13/2023    K 4.2 01/13/2023    CO2 28.0 01/13/2023    CALCIUM 8.7 01/13/2023    PROTENTOTREF 6.3 01/13/2023    ALBUMIN 4.2 01/13/2023    LABIL2 2.0 01/13/2023    AST 18 01/13/2023    ALT 39 (H) 01/13/2023     Lab Results   Component Value Date    ALKPHOS 54 01/13/2023    ALKPHOS 65 03/04/2021     The following portions of the patient's history were reviewed and   updated as appropriate: allergies, current medications, past family history, past medical history, past social history, past surgical history and problem list.    Compared to  one year ago, the patient feels her physical   health is the same.    Compared to one year ago, the patient feels her mental   health is the same.    Recent Hospitalizations:  She was not admitted to the hospital during the last year.     Current Medical Providers:  Patient Care Team:  Amari Mixon MD as PCP - General (Family Medicine)    Outpatient Medications Prior to Visit   Medication Sig Dispense Refill   • acetaminophen (TYLENOL) 500 MG tablet Take 500 mg by mouth Every 8 (Eight) Hours As Needed.     • Blood Glucose Monitoring Suppl kit 1 Device Daily. 1 each 0   • glucose blood test strip 1 each by Other route Daily. Use as instructed 100 each 3   • Lancets Ultra Fine misc 1 Device Daily. 100 each 3   • azelastine (ASTELIN) 0.1 % nasal spray 2 sprays both nostrils twice daily as needed 1 each 5   • Calcium Polycarbophil (FIBER LAXATIVE PO) Take  by mouth.     • Cranberry 125 MG tablet Take  by mouth Daily.     • diazePAM (VALIUM) 5 MG tablet 1 tablet 1 hour before schedule procedure 1 tablet 0   • dicyclomine (BENTYL) 20 MG tablet Take 1 tablet by mouth 2 (Two) Times a Day. 180 tablet 3   • fexofenadine-pseudoephedrine (ALLEGRA-D 24) 180-240 MG per 24 hr tablet Take 1 tablet by mouth Daily As Needed for Allergies. 30 tablet 0   • FLUoxetine (PROzac) 20 MG capsule TAKE 1 CAPSULE EVERY MORNING 90 capsule 3   • ibuprofen (ADVIL,MOTRIN) 800 MG tablet Take 1 tablet by mouth Every 8 (Eight) Hours As Needed for Mild Pain. 30 tablet 0   • montelukast (SINGULAIR) 10 MG tablet TAKE 1 TABLET EVERY DAY 90 tablet 3   • omeprazole (priLOSEC) 20 MG capsule Take 1 capsule by mouth Daily. 90 capsule 3   • ondansetron (ZOFRAN) 4 MG tablet Take 1 tablet by mouth Every 8 (Eight) Hours As Needed for Nausea or Vomiting. 30 tablet 1   • rosuvastatin (CRESTOR) 20 MG tablet Take 1 tablet by mouth Daily. 90 tablet 1   • Semaglutide,0.25 or 0.5MG/DOS, (Ozempic, 0.25 or 0.5 MG/DOSE,) 2 MG/1.5ML solution pen-injector Inject  0.5 mg under the skin into the appropriate area as directed 1 (One) Time Per Week. 6 mL 3   • methylPREDNISolone (MEDROL) 4 MG dose pack Take as directed on package instructions. 21 tablet 0     No facility-administered medications prior to visit.     No opioid medication identified on active medication list. I have reviewed chart for other potential  high risk medication/s and harmful drug interactions in the elderly.          Aspirin is not on active medication list.  Aspirin use is indicated based on review of current medical condition/s. Pros and cons of this therapy have been discussed with this patient. Benefits of this medication outweigh potential harm.  Patient has been instructed to start taking this medication..    Patient Active Problem List   Diagnosis   • Essential hypertension   • Nonsustained ventricular tachycardia   • Mixed hyperlipidemia   • KATALINA (obstructive sleep apnea)   • Panic attacks   • Hot flashes   • Healthcare maintenance   • Mechanical low back pain   • Chronic allergic rhinitis   • Type 2 diabetes mellitus, without long-term current use of insulin (HCC)   • Encounter for immunization   • Gastroesophageal reflux disease without esophagitis     Advance Care Planning  Advance Directive is not on file.  ACP discussion was held with the patient during this visit. Patient does not have an advance directive, information provided.    Review of Systems   Constitutional: Positive for fatigue. Negative for appetite change, chills, diaphoresis and fever.   HENT: Positive for congestion, postnasal drip and rhinorrhea. Negative for ear pain, sinus pressure, sneezing, sore throat, tinnitus and voice change.    Eyes: Negative for visual disturbance.   Respiratory: Negative for cough, shortness of breath and wheezing.    Cardiovascular: Negative for chest pain, palpitations and leg swelling.   Gastrointestinal: Negative for abdominal pain, blood in stool, constipation, diarrhea, nausea and vomiting.  "  Endocrine: Negative for polydipsia and polyuria.   Genitourinary: Negative for dysuria, frequency, hematuria and urgency.   Musculoskeletal: Positive for back pain. Negative for arthralgias, joint swelling and myalgias.   Skin: Negative for rash.   Neurological: Negative for weakness, numbness and confusion.   Psychiatric/Behavioral: Positive for sleep disturbance. Negative for decreased concentration, dysphoric mood and suicidal ideas. The patient is nervous/anxious (stressed at times).         Objective    Vitals:    01/16/23 1018   BP: 126/68   Pulse: 64   Resp: 14   Temp: 97.7 °F (36.5 °C)   TempSrc: Temporal   SpO2: 97%   Weight: 87.1 kg (192 lb)   Height: 167.6 cm (65.98\")     Estimated body mass index is 31 kg/m² as calculated from the following:    Height as of this encounter: 167.6 cm (65.98\").    Weight as of this encounter: 87.1 kg (192 lb).    BMI is >= 30 and <35. (Class 1 Obesity). The following options were offered after discussion;: exercise counseling/recommendations and nutrition counseling/recommendations    Does the patient have evidence of cognitive impairment? No    Physical Exam  Constitutional:       General: She is not in acute distress.     Appearance: Normal appearance. She is well-developed. She is not diaphoretic.      Comments: Accompanied by her mother.  Bright and in good spirits. No apparent distress. No pallor, jaundice, diaphoresis, or cyanosis.   HENT:      Head: Atraumatic.      Right Ear: Tympanic membrane, ear canal and external ear normal.      Left Ear: Tympanic membrane, ear canal and external ear normal.      Mouth/Throat:      Lips: No lesions.      Mouth: Mucous membranes are moist.      Pharynx: No oropharyngeal exudate or posterior oropharyngeal erythema.   Eyes:      Conjunctiva/sclera: Conjunctivae normal.   Neck:      Thyroid: No thyroid mass or thyromegaly.      Vascular: No carotid bruit or JVD.      Trachea: Trachea normal. No tracheal deviation. "   Cardiovascular:      Rate and Rhythm: Normal rate and regular rhythm.      Heart sounds: Normal heart sounds, S1 normal and S2 normal. No murmur heard.    No gallop.   Pulmonary:      Effort: Pulmonary effort is normal.      Breath sounds: Normal breath sounds.   Abdominal:      General: Bowel sounds are normal. There is no distension or abdominal bruit.      Palpations: Abdomen is soft. There is no hepatomegaly, splenomegaly or mass.      Tenderness: There is no abdominal tenderness.      Hernia: No hernia is present.   Musculoskeletal:      Lumbar back: Negative right straight leg raise test and negative left straight leg raise test.      Right lower leg: No edema.      Left lower leg: No edema.   Lymphadenopathy:      Head:      Right side of head: No submental, submandibular, tonsillar, preauricular, posterior auricular or occipital adenopathy.      Left side of head: No submental, submandibular, tonsillar, preauricular, posterior auricular or occipital adenopathy.      Cervical: No cervical adenopathy.      Upper Body:      Right upper body: No supraclavicular adenopathy.      Left upper body: No supraclavicular adenopathy.   Skin:     General: Skin is warm.      Coloration: Skin is not cyanotic, jaundiced or pale.      Findings: No rash.      Nails: There is no clubbing.   Neurological:      Mental Status: She is alert and oriented to person, place, and time.      Cranial Nerves: No cranial nerve deficit.      Motor: No weakness or tremor.      Coordination: Coordination normal.      Gait: Gait normal.   Psychiatric:         Attention and Perception: Attention normal.         Mood and Affect: Mood normal.         Speech: Speech normal.         Behavior: Behavior normal.         Thought Content: Thought content normal.       HEALTH RISK ASSESSMENT    Smoking Status:  Social History     Tobacco Use   Smoking Status Never   Smokeless Tobacco Never     Alcohol Consumption:  Social History     Substance and  Sexual Activity   Alcohol Use Never     Fall Risk Screen:  ALDAIRADI Fall Risk Assessment was completed, and patient is at HIGH risk for falls. Assessment completed on:1/16/2023    Depression Screening:  PHQ-2/PHQ-9 Depression Screening 1/16/2023   Retired PHQ-9 Total Score -   Retired Total Score -   Little Interest or Pleasure in Doing Things 0-->not at all   Feeling Down, Depressed or Hopeless 0-->not at all   PHQ-9: Brief Depression Severity Measure Score 0     Health Habits and Functional and Cognitive Screening:  Functional & Cognitive Status 1/16/2023   Do you have difficulty preparing food and eating? No   Do you have difficulty bathing yourself, getting dressed or grooming yourself? No   Do you have difficulty using the toilet? No   Do you have difficulty moving around from place to place? No   Do you have trouble with steps or getting out of a bed or a chair? No   Current Diet Other   Dental Exam Up to date   Eye Exam Up to date   Exercise (times per week) 7 times per week   Current Exercises Include Walking   Do you need help using the phone?  No   Are you deaf or do you have serious difficulty hearing?  No   Do you need help with transportation? No   Do you need help shopping? No   Do you need help preparing meals?  No   Do you need help with housework?  No   Do you need help with laundry? No   Do you need help taking your medications? No   Do you need help managing money? No   Do you ever drive or ride in a car without wearing a seat belt? No   Have you felt unusual stress, anger or loneliness in the last month? No   Who do you live with? Spouse   If you need help, do you have trouble finding someone available to you? No   Have you been bothered in the last four weeks by sexual problems? No   Do you have difficulty concentrating, remembering or making decisions? No     Age-appropriate Screening Schedule:  Refer to the list below for future screening recommendations based on patient's age, sex and/or  medical conditions. Orders for these recommended tests are listed in the plan section. The patient has been provided with a written plan.    Health Maintenance   Topic Date Due   • DXA SCAN  Never done   • DIABETIC FOOT EXAM  Never done   • DIABETIC EYE EXAM  Never done   • HEMOGLOBIN A1C  07/13/2023   • LIPID PANEL  01/13/2024   • URINE MICROALBUMIN  01/13/2024   • TDAP/TD VACCINES (2 - Td or Tdap) 10/27/2027   • INFLUENZA VACCINE  Completed        Assessment & Plan   CMS Preventative Services Quick Reference  Risk Factors Identified During Encounter  Chronic Pain: Natural history and expected course discussed. Questions answered.  Depression/Dysphoria: Current medication is effective, no change recommended  Immunizations Discussed/Encouraged: COVID19  The above risks/problems have been discussed with the patient.  Follow up actions/plans if indicated are seen below in the Assessment/Plan Section.  Pertinent information has been shared with the patient in the After Visit Summary.    Diagnoses and all orders for this visit:    1. Chronic allergic rhinitis   Reminded regarding allergen avoidance.  Continue current medication  Encouraged to report if any worse or if any new symptoms or concerns.  -     montelukast (SINGULAIR) 10 MG tablet; Take 1 tablet by mouth Daily.  Dispense: 90 tablet; Refill: 3  -     azelastine (ASTELIN) 0.1 % nasal spray; 2 sprays both nostrils twice daily as needed  Dispense: 1 each; Refill: 5    2. Essential hypertension   Hypertension: BP remains at goal off medication. Evidence of target organ damage: none.  Encouraged to continue to work on diet and exercise plan.   Addition of an ACE or ARB will be considered if her blood pressure should climb or if she should develop microalbuminuria    3. Mixed hyperlipidemia  As above.   Continue current medication.  -     rosuvastatin (CRESTOR) 20 MG tablet; Take 1 tablet by mouth Daily.  Dispense: 90 tablet; Refill: 3    4. Nonsustained ventricular  tachycardia    5. Type 2 diabetes mellitus without complication, without long-term current use of insulin (HCC)  Diabetes mellitus Type II, under inadequate control.   Encouraged to continue to pursue ADA diet  Encouraged aerobic exercise.  Semaglutide will be resumed at a dose of 0.25 weekly for 6 weeks 10.5 weekly afterward  We will recheck an A1c at her return  -     Semaglutide,0.25 or 0.5MG/DOS, (Ozempic, 0.25 or 0.5 MG/DOSE,) 2 MG/1.5ML solution pen-injector; 0.25 mg SC weekly x 6, then 0.5 SC weekly afterward  Dispense: 4.5 mL; Refill: 3    6. Healthcare maintenance  Recommended an updated bivalent COVID-19 vaccination.  Reminded to follow-up with her scheduled mammogram    7. Panic attacks   Stable.  Supportive therapy.   Continue current medication.  Encouraged to report if any worse or if any new symptoms or concerns.  -     FLUoxetine (PROzac) 20 MG capsule; Take 1 capsule by mouth Every Morning.  Dispense: 90 capsule; Refill: 3    8. Mechanical low back pain  Reminded regarding symptomatic treatment.   Encouraged to report if any worse or if any new symptoms or concerns.    9. KATALINA (obstructive sleep apnea)    10. Hot flashes    11. Gastroesophageal reflux disease without esophagitis   Symptoms are currently well controlled.  Encouraged to report if this should change.  Continue current medication  -     omeprazole (priLOSEC) 20 MG capsule; Take 1 capsule by mouth Daily.  Dispense: 90 capsule; Refill: 3    Other orders  -     dicyclomine (BENTYL) 20 MG tablet; Take 1 tablet by mouth 2 (Two) Times a Day.  Dispense: 180 tablet; Refill: 3        Follow Up:   Return in about 13 weeks (around 4/17/2023).     An After Visit Summary and PPPS were made available to the patient.

## 2023-02-06 ENCOUNTER — HOSPITAL ENCOUNTER (OUTPATIENT)
Dept: MAMMOGRAPHY | Facility: HOSPITAL | Age: 48
Discharge: HOME | End: 2023-02-06
Admitting: GENERAL PRACTICE
Payer: MEDICARE

## 2023-02-06 DIAGNOSIS — Z12.31 VISIT FOR SCREENING MAMMOGRAM: ICD-10-CM

## 2023-02-06 PROCEDURE — 77067 SCR MAMMO BI INCL CAD: CPT | Performed by: RADIOLOGY

## 2023-02-06 PROCEDURE — 77063 BREAST TOMOSYNTHESIS BI: CPT

## 2023-02-06 PROCEDURE — 77063 BREAST TOMOSYNTHESIS BI: CPT | Performed by: RADIOLOGY

## 2023-02-06 PROCEDURE — 77067 SCR MAMMO BI INCL CAD: CPT

## 2023-02-23 ENCOUNTER — LAB (OUTPATIENT)
Dept: LAB | Facility: HOSPITAL | Age: 48
End: 2023-02-23
Payer: MEDICARE

## 2023-02-23 ENCOUNTER — HOSPITAL ENCOUNTER (OUTPATIENT)
Dept: CT IMAGING | Facility: HOSPITAL | Age: 48
Discharge: HOME | End: 2023-02-23
Payer: MEDICARE

## 2023-02-23 ENCOUNTER — OFFICE VISIT (OUTPATIENT)
Dept: FAMILY MEDICINE CLINIC | Facility: CLINIC | Age: 48
End: 2023-02-23
Payer: MEDICARE

## 2023-02-23 VITALS
BODY MASS INDEX: 29.25 KG/M2 | OXYGEN SATURATION: 96 % | SYSTOLIC BLOOD PRESSURE: 125 MMHG | HEIGHT: 66 IN | WEIGHT: 182 LBS | TEMPERATURE: 98.6 F | RESPIRATION RATE: 14 BRPM | HEART RATE: 76 BPM | DIASTOLIC BLOOD PRESSURE: 62 MMHG

## 2023-02-23 DIAGNOSIS — M54.59 MECHANICAL LOW BACK PAIN: ICD-10-CM

## 2023-02-23 DIAGNOSIS — Z00.00 HEALTHCARE MAINTENANCE: ICD-10-CM

## 2023-02-23 DIAGNOSIS — R10.12 LUQ ABDOMINAL PAIN: ICD-10-CM

## 2023-02-23 DIAGNOSIS — R31.29 MICROSCOPIC HEMATURIA: ICD-10-CM

## 2023-02-23 DIAGNOSIS — R10.9 LEFT SIDED ABDOMINAL PAIN: Primary | ICD-10-CM

## 2023-02-23 DIAGNOSIS — E11.9 TYPE 2 DIABETES MELLITUS WITHOUT COMPLICATION, WITHOUT LONG-TERM CURRENT USE OF INSULIN: ICD-10-CM

## 2023-02-23 DIAGNOSIS — K21.9 GASTROESOPHAGEAL REFLUX DISEASE WITHOUT ESOPHAGITIS: ICD-10-CM

## 2023-02-23 DIAGNOSIS — I10 ESSENTIAL HYPERTENSION: ICD-10-CM

## 2023-02-23 DIAGNOSIS — E78.2 MIXED HYPERLIPIDEMIA: ICD-10-CM

## 2023-02-23 LAB
BILIRUB BLD-MCNC: NEGATIVE MG/DL
CLARITY, POC: CLEAR
COLOR UR: YELLOW
CREAT BLDA-MCNC: 1 MG/DL (ref 0.6–1.3)
GLUCOSE UR STRIP-MCNC: NEGATIVE MG/DL
KETONES UR QL: NEGATIVE
LEUKOCYTE EST, POC: NEGATIVE
NITRITE UR-MCNC: NEGATIVE MG/ML
PH UR: 6 [PH] (ref 5–8)
PROT UR STRIP-MCNC: NEGATIVE MG/DL
RBC # UR STRIP: ABNORMAL /UL
SP GR UR: 1.02 (ref 1–1.03)
UROBILINOGEN UR QL: NORMAL

## 2023-02-23 PROCEDURE — 80053 COMPREHEN METABOLIC PANEL: CPT

## 2023-02-23 PROCEDURE — 81002 URINALYSIS NONAUTO W/O SCOPE: CPT | Performed by: GENERAL PRACTICE

## 2023-02-23 PROCEDURE — 99214 OFFICE O/P EST MOD 30 MIN: CPT | Performed by: GENERAL PRACTICE

## 2023-02-23 PROCEDURE — 82150 ASSAY OF AMYLASE: CPT

## 2023-02-23 PROCEDURE — 36415 COLL VENOUS BLD VENIPUNCTURE: CPT

## 2023-02-23 PROCEDURE — 83690 ASSAY OF LIPASE: CPT

## 2023-02-23 PROCEDURE — 3052F HG A1C>EQUAL 8.0%<EQUAL 9.0%: CPT | Performed by: GENERAL PRACTICE

## 2023-02-23 PROCEDURE — 85025 COMPLETE CBC W/AUTO DIFF WBC: CPT

## 2023-02-23 PROCEDURE — 82565 ASSAY OF CREATININE: CPT

## 2023-02-23 PROCEDURE — 74177 CT ABD & PELVIS W/CONTRAST: CPT

## 2023-02-23 PROCEDURE — 25510000001 IOPAMIDOL 61 % SOLUTION: Performed by: GENERAL PRACTICE

## 2023-02-23 PROCEDURE — 74177 CT ABD & PELVIS W/CONTRAST: CPT | Performed by: RADIOLOGY

## 2023-02-23 RX ADMIN — IOPAMIDOL 80 ML: 612 INJECTION, SOLUTION INTRAVENOUS at 13:56

## 2023-02-23 NOTE — PROGRESS NOTES
Subjective   Iraida Heard is a 47 y.o. female.     Chief Complaint  Abdominal pain    History of Present Illness     Abdominal Pain  She returns with a 4 day history of left upper quadrant abdominal pain.  This started gradually and has been present continuously since.  She describes the pain as a sharp ache.  The pain does not radiate elsewhere.  She admits to occasional nausea with 1 episode of vomiting yesterday along with mild chills.  She denies any change in her bowel habits, hematochezia, or melena and there is no history of any frequency, urgency, dysuria, hematuria, suprapubic pressure, or vaginal bleeding.  She denies any cough or shortness of breath and there is no history of any rash or documented fever.  The pain has been a bit worse with voiding and with sudden movement.  She has been unable to identify any other exacerbating factors nor anything that relieves her discomfort.  She remains on omeprazole for gastroesophageal reflux disease with good control of her symptoms    Type 2 Diabetes Mellitus  She has not been following her diet and exercise plan as closely through winter.  She has resumed semaglutide and is currently on a dose of 0.5 weekly with no apparent side effects     Hyperlipidemia  She remains on rosuvastatin with no apparent side effects.    Essential Hypertension  She has been off medication for some time.  She continues to deny any chest pain, palpitations, shortness of breath, lightheadedness, or lower extremity edema    The following portions of the patient's history were reviewed and updated as appropriate: allergies, current medications, past medical history, past social history and problem list.    Review of Systems   Constitutional: Positive for chills and fatigue. Negative for appetite change, fever and unexpected weight change.   HENT: Positive for congestion and rhinorrhea. Negative for ear pain, postnasal drip, sinus pressure, sneezing and sore throat.    Eyes: Negative  for visual disturbance.   Respiratory: Negative for cough, shortness of breath and wheezing.    Cardiovascular: Negative for chest pain, palpitations and leg swelling.   Gastrointestinal: Positive for abdominal pain, nausea and vomiting. Negative for blood in stool, constipation and diarrhea.   Endocrine:        Intermittent hot flashes   Genitourinary: Negative for difficulty urinating, dysuria, frequency, hematuria and urgency.   Musculoskeletal: Positive for back pain. Negative for arthralgias, joint swelling, myalgias and neck pain.   Skin: Negative for rash.   Neurological: Negative for weakness, numbness and headaches.   Psychiatric/Behavioral: Positive for sleep disturbance. Negative for dysphoric mood and suicidal ideas. The patient is nervous/anxious.      Objective   Physical Exam  Constitutional:       General: She is not in acute distress.     Appearance: Normal appearance. She is well-developed. She is not diaphoretic.      Comments: Accompanied by her mother.  Alert and in fair spirits. No apparent distress. No pallor, jaundice, diaphoresis, or cyanosis.   HENT:      Head: Atraumatic.      Right Ear: Tympanic membrane, ear canal and external ear normal.      Left Ear: Tympanic membrane, ear canal and external ear normal.      Mouth/Throat:      Lips: No lesions.      Mouth: Mucous membranes are moist.      Pharynx: No oropharyngeal exudate or posterior oropharyngeal erythema.   Eyes:      Conjunctiva/sclera: Conjunctivae normal.   Neck:      Thyroid: No thyroid mass or thyromegaly.      Vascular: No carotid bruit or JVD.      Trachea: Trachea normal. No tracheal deviation.   Cardiovascular:      Rate and Rhythm: Normal rate and regular rhythm.      Heart sounds: Normal heart sounds, S1 normal and S2 normal. No murmur heard.    No gallop.   Pulmonary:      Effort: Pulmonary effort is normal.      Breath sounds: Normal breath sounds.   Abdominal:      General: Bowel sounds are normal. There is no  distension or abdominal bruit.      Palpations: Abdomen is soft. There is no hepatomegaly, splenomegaly or mass.      Tenderness: There is abdominal tenderness in the left upper quadrant. There is no right CVA tenderness, left CVA tenderness, guarding or rebound.      Hernia: No hernia is present.   Musculoskeletal:      Lumbar back: Negative right straight leg raise test and negative left straight leg raise test.      Right lower leg: No edema.      Left lower leg: No edema.   Lymphadenopathy:      Head:      Right side of head: No submental, submandibular, tonsillar, preauricular, posterior auricular or occipital adenopathy.      Left side of head: No submental, submandibular, tonsillar, preauricular, posterior auricular or occipital adenopathy.      Cervical: No cervical adenopathy.      Upper Body:      Right upper body: No supraclavicular adenopathy.      Left upper body: No supraclavicular adenopathy.   Skin:     General: Skin is warm.      Coloration: Skin is not cyanotic, jaundiced or pale.      Findings: No rash.      Nails: There is no clubbing.   Neurological:      Mental Status: She is alert and oriented to person, place, and time.      Cranial Nerves: No cranial nerve deficit.      Motor: No weakness or tremor.      Coordination: Coordination normal.      Gait: Gait normal.   Psychiatric:         Attention and Perception: Attention normal.         Mood and Affect: Mood normal.         Speech: Speech normal.         Behavior: Behavior normal.         Thought Content: Thought content normal.       Assessment & Plan   Problems Addressed this Visit        Cardiac and Vasculature    Essential hypertension   Hypertension: BP remains at goal off medication. Evidence of target organ damage: none.  Encouraged to continue to work on diet and exercise plan.     Mixed hyperlipidemia  As above.   Continue current medication.       Endocrine and Metabolic    Type 2 diabetes mellitus, without long-term current use of  insulin (HCC)  As above.   Continue current medication.       Gastrointestinal Abdominal     Gastroesophageal reflux disease without esophagitis  Continue current medication    LUQ abdominal pain  With microscopic urine.  Will send for microscopy and culture if indicated  Reviewed further options and agreed on updated labs and a contrast CT of the abdomen and pelvis.  Will notify patient of the results and any further action to be taken.  Encouraged to seek immediate reassessment if any worse or if any new symptoms or concerns in the meantime    Relevant Orders    POCT urinalysis dipstick, manual (Completed)    Urine Culture - Urine, Urine, Clean Catch    Urinalysis, Microscopic Only - Urine, Clean Catch    CT Abdomen Pelvis With Contrast (Completed)    CBC & Differential    Comprehensive Metabolic Panel    Lipase    Amylase    Urinalysis With Microscopic - Urine, Clean Catch       Genitourinary and Reproductive     Microscopic hematuria  As above.    Relevant Orders    POCT urinalysis dipstick, manual (Completed)    Urine Culture - Urine, Urine, Clean Catch    Urinalysis, Microscopic Only - Urine, Clean Catch    CT Abdomen Pelvis With Contrast (Completed)    CBC & Differential    Comprehensive Metabolic Panel    Lipase    Amylase    Urinalysis With Microscopic - Urine, Clean Catch       Health Encounters    Healthcare maintenance  Patient has received an updated bivalent COVID-19 shot.       Musculoskeletal and Injuries    Mechanical low back pain         Diagnoses       Codes Comments    Left sided abdominal pain    -  Primary ICD-10-CM: R10.9  ICD-9-CM: 789.09     Mixed hyperlipidemia     ICD-10-CM: E78.2  ICD-9-CM: 272.2     Essential hypertension     ICD-10-CM: I10  ICD-9-CM: 401.9     Type 2 diabetes mellitus without complication, without long-term current use of insulin (HCC)     ICD-10-CM: E11.9  ICD-9-CM: 250.00     Gastroesophageal reflux disease without esophagitis     ICD-10-CM: K21.9  ICD-9-CM: 530.81      Healthcare maintenance     ICD-10-CM: Z00.00  ICD-9-CM: V70.0     Mechanical low back pain     ICD-10-CM: M54.59  ICD-9-CM: 724.2     LUQ abdominal pain     ICD-10-CM: R10.12  ICD-9-CM: 789.02     Microscopic hematuria     ICD-10-CM: R31.29  ICD-9-CM: 599.72

## 2023-02-24 ENCOUNTER — TELEPHONE (OUTPATIENT)
Dept: FAMILY MEDICINE CLINIC | Facility: CLINIC | Age: 48
End: 2023-02-24

## 2023-02-24 DIAGNOSIS — R10.12 LUQ ABDOMINAL PAIN: ICD-10-CM

## 2023-02-24 DIAGNOSIS — K59.09 OTHER CONSTIPATION: ICD-10-CM

## 2023-02-24 DIAGNOSIS — N83.202 LEFT OVARIAN CYST: Primary | ICD-10-CM

## 2023-02-24 LAB
ALBUMIN SERPL-MCNC: 4.3 G/DL (ref 3.5–5.2)
ALBUMIN/GLOB SERPL: 1.4 G/DL
ALP SERPL-CCNC: 63 U/L (ref 39–117)
ALT SERPL W P-5'-P-CCNC: 61 U/L (ref 1–33)
AMYLASE SERPL-CCNC: 36 U/L (ref 28–100)
ANION GAP SERPL CALCULATED.3IONS-SCNC: 11.6 MMOL/L (ref 5–15)
AST SERPL-CCNC: 38 U/L (ref 1–32)
BASOPHILS # BLD AUTO: 0.03 10*3/MM3 (ref 0–0.2)
BASOPHILS NFR BLD AUTO: 0.3 % (ref 0–1.5)
BILIRUB SERPL-MCNC: 0.5 MG/DL (ref 0–1.2)
BUN SERPL-MCNC: 14 MG/DL (ref 6–20)
BUN/CREAT SERPL: 17.5 (ref 7–25)
CALCIUM SPEC-SCNC: 8.9 MG/DL (ref 8.6–10.5)
CHLORIDE SERPL-SCNC: 98 MMOL/L (ref 98–107)
CO2 SERPL-SCNC: 25.4 MMOL/L (ref 22–29)
CREAT SERPL-MCNC: 0.8 MG/DL (ref 0.57–1)
DEPRECATED RDW RBC AUTO: 48.7 FL (ref 37–54)
EGFRCR SERPLBLD CKD-EPI 2021: 92.2 ML/MIN/1.73
EOSINOPHIL # BLD AUTO: 0.13 10*3/MM3 (ref 0–0.4)
EOSINOPHIL NFR BLD AUTO: 1.1 % (ref 0.3–6.2)
ERYTHROCYTE [DISTWIDTH] IN BLOOD BY AUTOMATED COUNT: 13.3 % (ref 12.3–15.4)
GLOBULIN UR ELPH-MCNC: 3 GM/DL
GLUCOSE SERPL-MCNC: 72 MG/DL (ref 65–99)
HCT VFR BLD AUTO: 46 % (ref 34–46.6)
HGB BLD-MCNC: 15.2 G/DL (ref 12–15.9)
IMM GRANULOCYTES # BLD AUTO: 0.04 10*3/MM3 (ref 0–0.05)
IMM GRANULOCYTES NFR BLD AUTO: 0.4 % (ref 0–0.5)
LIPASE SERPL-CCNC: 27 U/L (ref 13–60)
LYMPHOCYTES # BLD AUTO: 1.94 10*3/MM3 (ref 0.7–3.1)
LYMPHOCYTES NFR BLD AUTO: 17.1 % (ref 19.6–45.3)
MCH RBC QN AUTO: 32.7 PG (ref 26.6–33)
MCHC RBC AUTO-ENTMCNC: 33 G/DL (ref 31.5–35.7)
MCV RBC AUTO: 98.9 FL (ref 79–97)
MONOCYTES # BLD AUTO: 1.32 10*3/MM3 (ref 0.1–0.9)
MONOCYTES NFR BLD AUTO: 11.6 % (ref 5–12)
NEUTROPHILS NFR BLD AUTO: 69.5 % (ref 42.7–76)
NEUTROPHILS NFR BLD AUTO: 7.88 10*3/MM3 (ref 1.7–7)
NRBC BLD AUTO-RTO: 0 /100 WBC (ref 0–0.2)
PLATELET # BLD AUTO: 306 10*3/MM3 (ref 140–450)
PMV BLD AUTO: 10.3 FL (ref 6–12)
POTASSIUM SERPL-SCNC: 3.7 MMOL/L (ref 3.5–5.2)
PROT SERPL-MCNC: 7.3 G/DL (ref 6–8.5)
RBC # BLD AUTO: 4.65 10*6/MM3 (ref 3.77–5.28)
SODIUM SERPL-SCNC: 135 MMOL/L (ref 136–145)
WBC NRBC COR # BLD: 11.34 10*3/MM3 (ref 3.4–10.8)

## 2023-02-24 NOTE — TELEPHONE ENCOUNTER
Caller: Iraida Heard    Relationship: Self    Best call back number:     [phone]     Caller requesting test results:     PATIENT    What test was performed:     BLOOD WORK    CT SCAN WITH CONTRAST    When was the test performed:     2/23/23    Where was the test performed:     [facility] [place]    Additional notes:     PATIENT REQUESTED A CALL BACK WHEN RESULTS HAVE ARRIVED AND DR PATEL HAS A CHANCE TO REVIEW

## 2023-02-24 NOTE — TELEPHONE ENCOUNTER
Caller: Iraida Heard    Relationship: Self    Best call back number:      [phone]     What medication are you requesting:     PATIENT REQUESTED A MEDICATION FOR LEFT SIDE STOMACH PAIN     If a prescription is needed, what is your preferred pharmacy and phone number:      [place], [place]    TELEPHONE CONTACT:    [phone]    DR PATEL

## 2023-02-25 LAB
BACTERIA #/AREA URNS HPF: ABNORMAL /HPF
BACTERIA UR CULT: NORMAL
BACTERIA UR CULT: NORMAL
CASTS URNS MICRO: ABNORMAL
EPI CELLS #/AREA URNS HPF: ABNORMAL /HPF
RBC #/AREA URNS HPF: ABNORMAL /HPF
WBC #/AREA URNS HPF: ABNORMAL /HPF

## 2023-03-22 NOTE — PROGRESS NOTES
Pt called back and we discussed results and recommendations. This was faxed to the infusion center and they will contact pt to schedule. Subjective   Iraida Heard is a 46 y.o. female.     Chief Complaint  Sinus problems    History of Present Illness     Chronic Allergic Rhinitis  She gives a long history of allergies with symptoms that have included nasal congestion, postnasal drip, parable pressure, and bilateral ear fullness. There is no history of any other upper respiratory tract symptoms and she denies any fever or chills. Her symptoms are year-round but generally worse in the summer. She gives a history of previous immunotherapy but does not recall ever being prescribed an oral antihistamine or nasal corticosteroid    Chest Pain  She gives a more then one year history of intermittent chest pain.  This occurs every day or two and lasts hours to all day.  The pain is described as a sharp left upper ache.  The pain does not radiate elsewhere.  While she admits to intermittent palpitations these are not consistently associated with her pain.  She also admits to significant fatigue.  There is no history of any shortness of breath, lightheadedness, calf pain, or some of the ankles and she denies any cough, hemoptysis, fever, or chills.  Nuclear stress testing and an echocardiogram performed on 3/4/2021 were unremarkable.  Cardiac event monitoring performed between 2/17/2021 and 3/2/2021 revealed a 12 beat episode of ventricular tachycardia.  She remains on metoprolol but does not feel that it has helped with her symptoms and has noted a marked increase in her fatigue.  She continues to be followed by cardiology    Hyperlipidemia  She has been following an appropriate diet but admits to getting less exercise over the last few years with a 10 to 15 pound weight gain.  She remains on rosuvastatin with no apparent side effects.    Panic Attacks  She admits to intermittent episodes of nervousness, worrying, flushing, and diaphoresis.  She denies persistent anxiety and there is no history of depression, loss of interest in activities, or suicide  ideation.  She remains on fluoxetine    Low Back Pain  She gives a 4 to 5 year history of intermittent low back pain.  This has been worse over the last several months.  There is no history of any recent strain or trauma nor any change in her activities.  The pain is described as a bilateral lower ache radiating to her right posterior thigh and calf.  The pain in her back has been worse than that in her leg.  She denies any weakness, numbness, or tingling and there is been no change in her bowel/bladder control.  The pain increases with activity improves with rest.  Plain films of the lumbar spine performed on 5/19/2021 were reported as showing mild degenerative disc disease at L4-5.  She has seen both a physical therapist and a chiropractor in the past with little improvement in her symptoms    The following portions of the patient's history were reviewed and updated as appropriate: allergies, current medications, past medical history, past social history and problem list.    Review of Systems   Constitutional: Positive for fatigue. Negative for appetite change, chills, fever and unexpected weight change.   HENT: Positive for ear pain (bilateral ear fullness), postnasal drip and rhinorrhea. Negative for congestion, sneezing and sore throat.    Eyes: Negative for visual disturbance.   Respiratory: Negative for cough, shortness of breath and wheezing.    Cardiovascular: Positive for chest pain and palpitations. Negative for leg swelling.   Gastrointestinal: Negative for abdominal pain, blood in stool, constipation, diarrhea, nausea and vomiting.   Genitourinary: Negative for difficulty urinating, dysuria, frequency, hematuria and urgency.   Musculoskeletal: Positive for back pain. Negative for arthralgias, joint swelling, myalgias and neck pain.   Skin: Negative for rash.   Neurological: Negative for weakness, numbness and headaches.   Psychiatric/Behavioral: Positive for sleep disturbance. Negative for dysphoric  mood and suicidal ideas. The patient is nervous/anxious.      Objective   Physical Exam  Constitutional:       General: She is not in acute distress.     Appearance: Normal appearance. She is well-developed. She is not diaphoretic.      Comments: Bright and in good spirits. No apparent distress. No pallor, jaundice, diaphoresis, or cyanosis.   HENT:      Head: Atraumatic.      Right Ear: Tympanic membrane, ear canal and external ear normal.      Left Ear: Tympanic membrane, ear canal and external ear normal.      Mouth/Throat:      Mouth: Mucous membranes are moist.      Pharynx: No posterior oropharyngeal erythema.   Eyes:      Conjunctiva/sclera: Conjunctivae normal.   Neck:      Thyroid: No thyroid mass or thyromegaly.      Vascular: No carotid bruit or JVD.      Trachea: Trachea normal. No tracheal deviation.   Cardiovascular:      Rate and Rhythm: Normal rate and regular rhythm.      Heart sounds: Normal heart sounds, S1 normal and S2 normal. No murmur heard.   No gallop.    Pulmonary:      Effort: Pulmonary effort is normal.      Breath sounds: Normal breath sounds.   Abdominal:      General: Bowel sounds are normal. There is no distension or abdominal bruit.      Palpations: Abdomen is soft. There is no hepatomegaly, splenomegaly or mass.      Tenderness: There is no abdominal tenderness.      Hernia: No hernia is present.   Musculoskeletal:      Right lower leg: No edema.      Left lower leg: No edema.   Lymphadenopathy:      Head:      Right side of head: No submental, submandibular, tonsillar, preauricular, posterior auricular or occipital adenopathy.      Left side of head: No submental, submandibular, tonsillar, preauricular, posterior auricular or occipital adenopathy.      Cervical: No cervical adenopathy.      Upper Body:      Right upper body: No supraclavicular adenopathy.      Left upper body: No supraclavicular adenopathy.   Skin:     General: Skin is warm.      Coloration: Skin is not cyanotic,  jaundiced or pale.      Findings: No rash.      Nails: There is no clubbing.   Neurological:      Mental Status: She is alert and oriented to person, place, and time.      Cranial Nerves: No cranial nerve deficit.      Motor: No weakness or tremor.      Coordination: Coordination normal.      Gait: Gait normal.   Psychiatric:         Attention and Perception: Attention normal.         Mood and Affect: Mood normal.         Speech: Speech normal.         Behavior: Behavior normal.         Thought Content: Thought content normal.       Assessment/Plan   Problems Addressed this Visit        Allergies and Adverse Reactions    Chronic allergic rhinitis  Advised regarding allergen avoidance  Agreed on a trial of nasal fluticasone and oral cetirizine  Encouraged to report if any worse, any new symptoms, or if no better over the next several weeks    Relevant Medications    fluticasone (Flonase) 50 MCG/ACT nasal spray    cetirizine (zyrTEC) 10 MG tablet       Cardiac and Vasculature    Essential hypertension  Encouraged to continue to work on her diet and exercise plan.  Metoprolol will be tapered and discontinued over the next 2 weeks    Relevant Medications    metoprolol tartrate (LOPRESSOR) 25 MG tablet    Mixed hyperlipidemia  As above.   Continue current medication.    Nonsustained ventricular tachycardia (CMS/HCC)  As above.  Follow up with cardiology     Relevant Medications    metoprolol tartrate (LOPRESSOR) 25 MG tablet    Precordial chest pain   Follow up with cardiology        Endocrine and Metabolic    Prediabetes  As above.  Will continue to monitor       Health Encounters    Healthcare maintenance  Patient has received both doses of the COVID-19 vaccine.  Reminded to get a flu shot when available.       Mental Health    Panic attacks  Stable.  Supportive therapy.   Continue current medication.       Musculoskeletal and Injuries    Mechanical low back pain  Reminded regarding symptomatic treatment.   Reviewed  options and agreed on a CT. Patient will be notified of the results and any further action to be taken. Encouraged report if any worse or if any new symptoms or concerns in the meantime    Relevant Orders    CT lumbar spine wo contrast      Diagnoses       Codes Comments    Precordial chest pain    -  Primary ICD-10-CM: R07.2  ICD-9-CM: 786.51     Nonsustained ventricular tachycardia (CMS/HCC)     ICD-10-CM: I47.2  ICD-9-CM: 427.1     Mixed hyperlipidemia     ICD-10-CM: E78.2  ICD-9-CM: 272.2     Essential hypertension     ICD-10-CM: I10  ICD-9-CM: 401.9     Prediabetes     ICD-10-CM: R73.03  ICD-9-CM: 790.29     Healthcare maintenance     ICD-10-CM: Z00.00  ICD-9-CM: V70.0     Panic attacks     ICD-10-CM: F41.0  ICD-9-CM: 300.01     Chronic allergic rhinitis     ICD-10-CM: J30.9  ICD-9-CM: 477.9     Mechanical low back pain     ICD-10-CM: M54.5  ICD-9-CM: 724.2

## 2023-05-01 ENCOUNTER — OFFICE VISIT (OUTPATIENT)
Dept: FAMILY MEDICINE CLINIC | Facility: CLINIC | Age: 48
End: 2023-05-01
Payer: MEDICARE

## 2023-05-01 VITALS
HEART RATE: 97 BPM | RESPIRATION RATE: 14 BRPM | SYSTOLIC BLOOD PRESSURE: 124 MMHG | BODY MASS INDEX: 30.22 KG/M2 | OXYGEN SATURATION: 93 % | DIASTOLIC BLOOD PRESSURE: 70 MMHG | TEMPERATURE: 98.4 F | HEIGHT: 66 IN | WEIGHT: 188 LBS

## 2023-05-01 DIAGNOSIS — N83.202 LEFT OVARIAN CYST: ICD-10-CM

## 2023-05-01 DIAGNOSIS — J30.9 CHRONIC ALLERGIC RHINITIS: Primary | ICD-10-CM

## 2023-05-01 DIAGNOSIS — E11.9 TYPE 2 DIABETES MELLITUS WITHOUT COMPLICATION, WITHOUT LONG-TERM CURRENT USE OF INSULIN: ICD-10-CM

## 2023-05-01 DIAGNOSIS — R74.8 ELEVATED LIVER ENZYMES: ICD-10-CM

## 2023-05-01 DIAGNOSIS — K59.09 OTHER CONSTIPATION: ICD-10-CM

## 2023-05-01 DIAGNOSIS — I47.29 NONSUSTAINED VENTRICULAR TACHYCARDIA: ICD-10-CM

## 2023-05-01 DIAGNOSIS — M54.59 MECHANICAL LOW BACK PAIN: ICD-10-CM

## 2023-05-01 DIAGNOSIS — R10.2 PELVIC PAIN: ICD-10-CM

## 2023-05-01 DIAGNOSIS — G47.33 OSA (OBSTRUCTIVE SLEEP APNEA): ICD-10-CM

## 2023-05-01 DIAGNOSIS — Z11.59 ENCOUNTER FOR SCREENING FOR OTHER VIRAL DISEASES: ICD-10-CM

## 2023-05-01 DIAGNOSIS — Z00.00 HEALTHCARE MAINTENANCE: ICD-10-CM

## 2023-05-01 DIAGNOSIS — I10 ESSENTIAL HYPERTENSION: ICD-10-CM

## 2023-05-01 DIAGNOSIS — F41.0 PANIC ATTACKS: ICD-10-CM

## 2023-05-01 DIAGNOSIS — K21.9 GASTROESOPHAGEAL REFLUX DISEASE WITHOUT ESOPHAGITIS: ICD-10-CM

## 2023-05-01 DIAGNOSIS — E78.2 MIXED HYPERLIPIDEMIA: ICD-10-CM

## 2023-05-01 DIAGNOSIS — R31.29 MICROSCOPIC HEMATURIA: ICD-10-CM

## 2023-05-01 PROBLEM — R10.12 LUQ ABDOMINAL PAIN: Status: RESOLVED | Noted: 2023-02-23 | Resolved: 2023-05-01

## 2023-05-01 NOTE — PROGRESS NOTES
Subjective   Iraida Heard is a 48 y.o. female.     Chief Complaint  She returns for a scheduled reassessment of multiple medical problems including pelvic pain, type 2 DM, hyperlipidemia, and essential hypertension    History of Present Illness     Pelvic Pain  Since last here she has had intermittent left lower abdominal and pelvic pain. this is described as a dull ache. The pain does not radiate elsewhere.  She admits to urinary frequency and experiences a sense of urgency with pressure over the left lower abdomen. She denies any dysuria, hematuria, suprapubic pressure, or vaginal bleeding. She denies any further nausea or vomiting and has had no change in her bowel habits. Contrast CT of the abdomen/pelvis performed on 2/23/23 was reported as showing mild enlargement of the left ovary with a left adnexal cyst. While records have yet to be received, she apparently underwent a recent GYN assessment that included an U/S, and will be returning for reassessment in 8 weeks  Lab Results   Component Value Date    WBC 11.34 (H) 02/23/2023    HGB 15.2 02/23/2023    HCT 46.0 02/23/2023    MCV 98.9 (H) 02/23/2023     02/23/2023     Lab Results   Component Value Date    GLUCOSE 72 02/23/2023    BUN 14 02/23/2023    CREATININE 1.00 02/23/2023    EGFRRESULT 92.2 01/13/2023    EGFR 92.2 02/23/2023    BCR 17.5 02/23/2023    K 3.7 02/23/2023    CO2 25.4 02/23/2023    CALCIUM 8.9 02/23/2023    PROTENTOTREF 6.3 01/13/2023    ALBUMIN 4.3 02/23/2023    BILITOT 0.5 02/23/2023    AST 38 (H) 02/23/2023    ALT 61 (H) 02/23/2023     Lab Results   Component Value Date    ALKPHOS 63 02/23/2023     Type 2 Diabetes Mellitus  She has been following her diet and exercise plan more carefully.  She is currently on semaglutide 0.5 weekly with no apparent side effects but apparently her insurance is no longer covering it     Hyperlipidemia  She remains on rosuvastatin with no apparent side effects.    Essential Hypertension  She has been  off medication for some time.  She continues to deny any chest pain, palpitations, shortness of breath, lightheadedness, or lower extremity edema    The following portions of the patient's history were reviewed and updated as appropriate: allergies, current medications, past medical history, past social history and problem list.    Review of Systems   Constitutional: Positive for fatigue. Negative for appetite change, chills, fever and unexpected weight change.   HENT: Positive for congestion and rhinorrhea. Negative for ear pain, postnasal drip, sinus pressure, sneezing and sore throat.    Eyes: Negative for visual disturbance.   Respiratory: Negative for cough, shortness of breath and wheezing.    Cardiovascular: Negative for chest pain, palpitations and leg swelling.   Gastrointestinal: Negative for abdominal pain, blood in stool, constipation, diarrhea, nausea and vomiting.   Endocrine:        Intermittent hot flashes   Genitourinary: Positive for frequency, pelvic pain and urgency. Negative for difficulty urinating, dysuria and hematuria.   Musculoskeletal: Positive for back pain. Negative for arthralgias, joint swelling, myalgias and neck pain.   Skin: Negative for rash.   Neurological: Negative for weakness, numbness and headaches.   Psychiatric/Behavioral: Positive for sleep disturbance. Negative for dysphoric mood and suicidal ideas. The patient is nervous/anxious.      Objective   Physical Exam  Constitutional:       General: She is not in acute distress.     Appearance: Normal appearance. She is well-developed. She is not diaphoretic.      Comments: Accompanied by her mother.  Bright and in good spirits. No apparent distress. No pallor, jaundice, diaphoresis, or cyanosis.   HENT:      Head: Atraumatic.      Right Ear: Tympanic membrane, ear canal and external ear normal.      Left Ear: Tympanic membrane, ear canal and external ear normal.      Mouth/Throat:      Lips: No lesions.      Mouth: Mucous  membranes are moist. No oral lesions.      Pharynx: No oropharyngeal exudate or posterior oropharyngeal erythema.   Eyes:      General: Lids are normal.      Extraocular Movements: Extraocular movements intact.      Conjunctiva/sclera: Conjunctivae normal.      Pupils: Pupils are equal.   Neck:      Thyroid: No thyroid mass or thyromegaly.      Vascular: No carotid bruit or JVD.      Trachea: Trachea normal. No tracheal deviation.   Cardiovascular:      Rate and Rhythm: Normal rate and regular rhythm.      Heart sounds: Normal heart sounds, S1 normal and S2 normal. No murmur heard.    No gallop.   Pulmonary:      Effort: Pulmonary effort is normal.      Breath sounds: Normal breath sounds.   Abdominal:      General: Bowel sounds are normal. There is no distension or abdominal bruit.      Palpations: Abdomen is soft. There is no hepatomegaly, splenomegaly or mass.      Tenderness: There is no abdominal tenderness. There is no right CVA tenderness, left CVA tenderness, guarding or rebound.      Hernia: No hernia is present.   Musculoskeletal:      Lumbar back: Negative right straight leg raise test and negative left straight leg raise test.      Right lower leg: No edema.      Left lower leg: No edema.   Lymphadenopathy:      Head:      Right side of head: No submental, submandibular, tonsillar, preauricular, posterior auricular or occipital adenopathy.      Left side of head: No submental, submandibular, tonsillar, preauricular, posterior auricular or occipital adenopathy.      Cervical: No cervical adenopathy.      Upper Body:      Right upper body: No supraclavicular adenopathy.      Left upper body: No supraclavicular adenopathy.   Skin:     General: Skin is warm.      Coloration: Skin is not cyanotic, jaundiced or pale.      Findings: No rash.      Nails: There is no clubbing.   Neurological:      Mental Status: She is alert and oriented to person, place, and time.      Cranial Nerves: No cranial nerve deficit,  dysarthria or facial asymmetry.      Sensory: No sensory deficit.      Motor: No weakness or tremor.      Coordination: Coordination normal.      Gait: Gait normal.   Psychiatric:         Attention and Perception: Attention normal.         Mood and Affect: Mood normal.         Speech: Speech normal.         Behavior: Behavior normal.         Thought Content: Thought content normal.       Assessment & Plan   Problems Addressed this Visit        Allergies and Adverse Reactions    Chronic allergic rhinitis        Cardiac and Vasculature    Essential hypertension   Hypertension: BP remains at goal off medication. Evidence of target organ damage: none.  Encouraged to continue to work on diet and exercise plan.   Will continue to monitor BP and urine microalbumin levels    Relevant Orders    CBC & Differential    Comprehensive Metabolic Panel    TSH    Mixed hyperlipidemia  As above.   Continue current medication.    Relevant Orders    Comprehensive Metabolic Panel    Lipid Panel    TSH    Nonsustained ventricular tachycardia    Relevant Orders    CBC & Differential    TSH       Endocrine and Metabolic    Type 2 diabetes mellitus, without long-term current use of insulin  As above.   Updated labs drawn  Will determine which GLP agonist is now preferred by her insurance    Relevant Orders    Comprehensive Metabolic Panel    TSH    Hemoglobin A1c    Vitamin B12    MicroAlbumin, Urine, Random - Urine, Clean Catch       Gastrointestinal Abdominal     Elevated liver enzymes    Relevant Orders    CBC & Differential    Comprehensive Metabolic Panel    Iron    Transferrin    Ferritin    Hepatitis B Surface Antigen    Hepatitis B Surface Antibody    Gastroesophageal reflux disease without esophagitis    Other constipation    Pelvic pain  Follow up with gynecology       Genitourinary and Reproductive     Left ovarian cyst  As above.    Relevant Orders    CBC & Differential    Urinalysis With Culture If Indicated - Urine, Clean  Catch    Microscopic hematuria  Will continue to monitor    Relevant Orders    CBC & Differential    Urinalysis With Culture If Indicated - Urine, Clean Catch       Health Encounters    Healthcare maintenance  Will discuss hepatitis B immunization at her return       Mental Health    Panic attacks  Stable.  Supportive therapy.   Continue current medication.  Encouraged to report if any worse or if any new symptoms or concerns.    Relevant Orders    TSH       Musculoskeletal and Injuries    Mechanical low back pain       Sleep    KATALINA (obstructive sleep apnea)         Diagnoses       Codes Comments    Chronic allergic rhinitis    -  Primary ICD-10-CM: J30.9  ICD-9-CM: 477.9     Essential hypertension     ICD-10-CM: I10  ICD-9-CM: 401.9     Nonsustained ventricular tachycardia     ICD-10-CM: I47.29  ICD-9-CM: 427.1     Mixed hyperlipidemia     ICD-10-CM: E78.2  ICD-9-CM: 272.2     Type 2 diabetes mellitus without complication, without long-term current use of insulin     ICD-10-CM: E11.9  ICD-9-CM: 250.00     Gastroesophageal reflux disease without esophagitis     ICD-10-CM: K21.9  ICD-9-CM: 530.81     Other constipation     ICD-10-CM: K59.09  ICD-9-CM: 564.09     Healthcare maintenance     ICD-10-CM: Z00.00  ICD-9-CM: V70.0     Panic attacks     ICD-10-CM: F41.0  ICD-9-CM: 300.01     Mechanical low back pain     ICD-10-CM: M54.59  ICD-9-CM: 724.2     KATALINA (obstructive sleep apnea)     ICD-10-CM: G47.33  ICD-9-CM: 327.23     Pelvic pain     ICD-10-CM: R10.2  ICD-9-CM: [id number]     Microscopic hematuria     ICD-10-CM: R31.29  ICD-9-CM: 599.72     Left ovarian cyst     ICD-10-CM: N83.202  ICD-9-CM: 620.2     Elevated liver enzymes     ICD-10-CM: R74.8  ICD-9-CM: 790.5     Encounter for screening for other viral diseases     ICD-10-CM: Z11.59  ICD-9-CM: V73.89

## 2023-05-02 LAB
ALBUMIN SERPL-MCNC: 4.3 G/DL (ref 3.5–5.2)
ALBUMIN/GLOB SERPL: 1.9 G/DL
ALP SERPL-CCNC: 66 U/L (ref 39–117)
ALT SERPL-CCNC: 20 U/L (ref 1–33)
APPEARANCE UR: CLEAR
AST SERPL-CCNC: 16 U/L (ref 1–32)
BACTERIA #/AREA URNS HPF: NORMAL /HPF
BASOPHILS # BLD AUTO: 0.04 10*3/MM3 (ref 0–0.2)
BASOPHILS NFR BLD AUTO: 0.5 % (ref 0–1.5)
BILIRUB SERPL-MCNC: 0.3 MG/DL (ref 0–1.2)
BILIRUB UR QL STRIP: NEGATIVE
BUN SERPL-MCNC: 7 MG/DL (ref 6–20)
BUN/CREAT SERPL: 9.2 (ref 7–25)
CALCIUM SERPL-MCNC: 9.1 MG/DL (ref 8.6–10.5)
CASTS URNS QL MICRO: NORMAL /LPF
CHLORIDE SERPL-SCNC: 103 MMOL/L (ref 98–107)
CHOLEST SERPL-MCNC: 129 MG/DL (ref 0–200)
CO2 SERPL-SCNC: 25.3 MMOL/L (ref 22–29)
COLOR UR: YELLOW
CREAT SERPL-MCNC: 0.76 MG/DL (ref 0.57–1)
EGFRCR SERPLBLD CKD-EPI 2021: 97.4 ML/MIN/1.73
EOSINOPHIL # BLD AUTO: 0.13 10*3/MM3 (ref 0–0.4)
EOSINOPHIL NFR BLD AUTO: 1.6 % (ref 0.3–6.2)
EPI CELLS #/AREA URNS HPF: NORMAL /HPF (ref 0–10)
ERYTHROCYTE [DISTWIDTH] IN BLOOD BY AUTOMATED COUNT: 11.7 % (ref 12.3–15.4)
FERRITIN SERPL-MCNC: 277 NG/ML (ref 13–150)
GLOBULIN SER CALC-MCNC: 2.3 GM/DL
GLUCOSE SERPL-MCNC: 123 MG/DL (ref 65–99)
GLUCOSE UR QL STRIP: NEGATIVE
HBA1C MFR BLD: 6.2 % (ref 4.8–5.6)
HBV SURFACE AB SER QL: NON REACTIVE
HBV SURFACE AG SERPL QL IA: NEGATIVE
HCT VFR BLD AUTO: 44.8 % (ref 34–46.6)
HDLC SERPL-MCNC: 43 MG/DL (ref 40–60)
HGB BLD-MCNC: 15.1 G/DL (ref 12–15.9)
HGB UR QL STRIP: NEGATIVE
IMM GRANULOCYTES # BLD AUTO: 0.04 10*3/MM3 (ref 0–0.05)
IMM GRANULOCYTES NFR BLD AUTO: 0.5 % (ref 0–0.5)
IRON SERPL-MCNC: 112 MCG/DL (ref 37–145)
KETONES UR QL STRIP: NEGATIVE
LDLC SERPL CALC-MCNC: 55 MG/DL (ref 0–100)
LEUKOCYTE ESTERASE UR QL STRIP: NEGATIVE
LYMPHOCYTES # BLD AUTO: 2.12 10*3/MM3 (ref 0.7–3.1)
LYMPHOCYTES NFR BLD AUTO: 26.5 % (ref 19.6–45.3)
MCH RBC QN AUTO: 33.3 PG (ref 26.6–33)
MCHC RBC AUTO-ENTMCNC: 33.7 G/DL (ref 31.5–35.7)
MCV RBC AUTO: 98.7 FL (ref 79–97)
MICRO URNS: NORMAL
MICRO URNS: NORMAL
MICROALBUMIN UR-MCNC: 5.6 UG/ML
MONOCYTES # BLD AUTO: 0.75 10*3/MM3 (ref 0.1–0.9)
MONOCYTES NFR BLD AUTO: 9.4 % (ref 5–12)
NEUTROPHILS # BLD AUTO: 4.93 10*3/MM3 (ref 1.7–7)
NEUTROPHILS NFR BLD AUTO: 61.5 % (ref 42.7–76)
NITRITE UR QL STRIP: NEGATIVE
NRBC BLD AUTO-RTO: 0 /100 WBC (ref 0–0.2)
PH UR STRIP: 5.5 [PH] (ref 5–7.5)
PLATELET # BLD AUTO: 307 10*3/MM3 (ref 140–450)
POTASSIUM SERPL-SCNC: 4.1 MMOL/L (ref 3.5–5.2)
PROT SERPL-MCNC: 6.6 G/DL (ref 6–8.5)
PROT UR QL STRIP: NEGATIVE
RBC # BLD AUTO: 4.54 10*6/MM3 (ref 3.77–5.28)
RBC #/AREA URNS HPF: NORMAL /HPF (ref 0–2)
SODIUM SERPL-SCNC: 140 MMOL/L (ref 136–145)
SP GR UR STRIP: 1.01 (ref 1–1.03)
TRANSFERRIN SERPL-MCNC: 248 MG/DL (ref 192–364)
TRIGL SERPL-MCNC: 185 MG/DL (ref 0–150)
TSH SERPL DL<=0.005 MIU/L-ACNC: 1.33 UIU/ML (ref 0.27–4.2)
URINALYSIS REFLEX: NORMAL
UROBILINOGEN UR STRIP-MCNC: 0.2 MG/DL (ref 0.2–1)
VIT B12 SERPL-MCNC: 685 PG/ML (ref 211–946)
VLDLC SERPL CALC-MCNC: 31 MG/DL (ref 5–40)
WBC # BLD AUTO: 8.01 10*3/MM3 (ref 3.4–10.8)
WBC #/AREA URNS HPF: NORMAL /HPF (ref 0–5)

## 2023-05-18 ENCOUNTER — TELEPHONE (OUTPATIENT)
Dept: FAMILY MEDICINE CLINIC | Facility: CLINIC | Age: 48
End: 2023-05-18
Payer: MEDICARE

## 2023-05-18 DIAGNOSIS — E11.65 TYPE 2 DIABETES MELLITUS WITH HYPERGLYCEMIA, WITHOUT LONG-TERM CURRENT USE OF INSULIN: Chronic | ICD-10-CM

## 2023-05-18 NOTE — TELEPHONE ENCOUNTER
Left a message for the patient with this information.      ----- Message from Amari Mixon MD sent at 5/16/2023  7:00 PM EDT -----  OK - please let her know  Thanks Jackie    ----- Message -----  From: Richa Paige MA  Sent: 5/16/2023  10:52 AM EDT  To: Amari Mixon MD    I finally got a hold of the pharmacy and the patient's are currently in the coverage gap.     ----- Message -----  From: Amari Mixon MD  Sent: 5/1/2023   4:34 PM EDT  To: Richa Paige MA    Please check with her pharmacy why most recent copay so high (?that's her tier 3 copay ?deductible ?other). Please do same for her mother Yennifer Puckett    ----- Message -----  From: Richa Paige MA  Sent: 5/1/2023   1:48 PM EDT  To: Amari Mixon MD    All of these are Tier 3 so they will have the same copay.   ----- Message -----  From: Amari Mixon MD  Sent: 5/1/2023  12:54 PM EDT  To: Richa Paige MA    Her copay on ozempic has apparently gone up to $700  Please find out if her insurance prefers another once weekly GLP agonist (eg trulicity) or whether it covers mounjaro

## 2023-05-19 RX ORDER — DIPHENHYDRAMINE HCL 25 MG
TABLET ORAL
Qty: 1 KIT | Refills: 0 | Status: SHIPPED | OUTPATIENT
Start: 2023-05-19

## 2023-05-19 RX ORDER — GLUCOSAM/CHON-MSM1/C/MANG/BOSW 500-416.6
TABLET ORAL
Qty: 100 EACH | Refills: 3 | Status: SHIPPED | OUTPATIENT
Start: 2023-05-19

## 2023-05-19 RX ORDER — CALCIUM CITRATE/VITAMIN D3 200MG-6.25
TABLET ORAL
Qty: 100 EACH | Refills: 5 | Status: SHIPPED | OUTPATIENT
Start: 2023-05-19

## 2023-07-25 RX ORDER — DOXYCYCLINE HYCLATE 100 MG/1
100 CAPSULE ORAL 2 TIMES DAILY
Qty: 20 CAPSULE | Refills: 0 | Status: SHIPPED | OUTPATIENT
Start: 2023-07-25 | End: 2023-08-04

## 2023-08-01 ENCOUNTER — OFFICE VISIT (OUTPATIENT)
Dept: FAMILY MEDICINE CLINIC | Facility: CLINIC | Age: 48
End: 2023-08-01
Payer: MEDICARE

## 2023-08-01 DIAGNOSIS — F41.0 PANIC ATTACKS: ICD-10-CM

## 2023-08-01 DIAGNOSIS — K59.09 OTHER CONSTIPATION: ICD-10-CM

## 2023-08-01 DIAGNOSIS — I10 ESSENTIAL HYPERTENSION: ICD-10-CM

## 2023-08-01 DIAGNOSIS — Z00.00 HEALTHCARE MAINTENANCE: ICD-10-CM

## 2023-08-01 DIAGNOSIS — G47.33 OSA (OBSTRUCTIVE SLEEP APNEA): ICD-10-CM

## 2023-08-01 DIAGNOSIS — K21.9 GASTROESOPHAGEAL REFLUX DISEASE WITHOUT ESOPHAGITIS: ICD-10-CM

## 2023-08-01 DIAGNOSIS — M54.59 MECHANICAL LOW BACK PAIN: ICD-10-CM

## 2023-08-01 DIAGNOSIS — Z23 ENCOUNTER FOR IMMUNIZATION: ICD-10-CM

## 2023-08-01 DIAGNOSIS — N39.41 URGE URINARY INCONTINENCE: ICD-10-CM

## 2023-08-01 DIAGNOSIS — J30.9 CHRONIC ALLERGIC RHINITIS: Primary | ICD-10-CM

## 2023-08-01 DIAGNOSIS — R31.29 MICROSCOPIC HEMATURIA: ICD-10-CM

## 2023-08-01 DIAGNOSIS — E11.9 TYPE 2 DIABETES MELLITUS WITHOUT COMPLICATION, WITHOUT LONG-TERM CURRENT USE OF INSULIN: ICD-10-CM

## 2023-08-01 DIAGNOSIS — E78.2 MIXED HYPERLIPIDEMIA: ICD-10-CM

## 2023-08-01 DIAGNOSIS — R10.32 LLQ PAIN: ICD-10-CM

## 2023-08-01 DIAGNOSIS — N83.202 LEFT OVARIAN CYST: ICD-10-CM

## 2023-08-02 VITALS
TEMPERATURE: 98.6 F | OXYGEN SATURATION: 93 % | RESPIRATION RATE: 14 BRPM | HEART RATE: 87 BPM | HEIGHT: 66 IN | WEIGHT: 190 LBS | BODY MASS INDEX: 30.53 KG/M2 | SYSTOLIC BLOOD PRESSURE: 124 MMHG | DIASTOLIC BLOOD PRESSURE: 70 MMHG

## 2023-08-11 ENCOUNTER — LAB (OUTPATIENT)
Dept: FAMILY MEDICINE CLINIC | Facility: CLINIC | Age: 48
End: 2023-08-11
Payer: MEDICARE

## 2023-08-11 DIAGNOSIS — R31.29 MICROSCOPIC HEMATURIA: Primary | ICD-10-CM

## 2023-08-12 LAB
APPEARANCE UR: CLEAR
BACTERIA #/AREA URNS HPF: NORMAL /HPF
BILIRUB UR QL STRIP: NEGATIVE
CASTS URNS QL MICRO: NORMAL /LPF
COLOR UR: YELLOW
EPI CELLS #/AREA URNS HPF: NORMAL /HPF (ref 0–10)
GLUCOSE UR QL STRIP: NEGATIVE
HGB UR QL STRIP: NEGATIVE
KETONES UR QL STRIP: NEGATIVE
LEUKOCYTE ESTERASE UR QL STRIP: NEGATIVE
MICRO URNS: NORMAL
MICRO URNS: NORMAL
NITRITE UR QL STRIP: NEGATIVE
PH UR STRIP: 5.5 [PH] (ref 5–7.5)
PROT UR QL STRIP: NEGATIVE
RBC #/AREA URNS HPF: NORMAL /HPF (ref 0–2)
SP GR UR STRIP: 1.02 (ref 1–1.03)
URINALYSIS REFLEX: NORMAL
UROBILINOGEN UR STRIP-MCNC: 0.2 MG/DL (ref 0.2–1)
WBC #/AREA URNS HPF: NORMAL /HPF (ref 0–5)

## 2023-08-15 ENCOUNTER — TELEPHONE (OUTPATIENT)
Dept: FAMILY MEDICINE CLINIC | Facility: CLINIC | Age: 48
End: 2023-08-15

## 2023-08-15 NOTE — TELEPHONE ENCOUNTER
Caller: [facility]    Relationship:     Best call back number: [phone]    Caller requesting test results: OSKAR      What test was performed: UA     When was the test performed: 8/11/2023    Where was the test performed: [facility]     Additional notes: NEED RESULTS CALLED TO HER TODAY. CALLED OR FAXED TO HER [phone]

## 2023-09-12 ENCOUNTER — TELEPHONE (OUTPATIENT)
Dept: FAMILY MEDICINE CLINIC | Facility: CLINIC | Age: 48
End: 2023-09-12
Payer: MEDICARE

## 2023-09-12 NOTE — TELEPHONE ENCOUNTER
----- Message from Amari Mixon MD sent at 9/12/2023  9:25 AM EDT -----  Will be dropping by sometime for a hepatitis B shot and flu shot

## 2023-09-27 DIAGNOSIS — F41.0 PANIC ATTACKS: ICD-10-CM

## 2023-09-27 RX ORDER — FLUOXETINE HYDROCHLORIDE 20 MG/1
CAPSULE ORAL
Qty: 90 CAPSULE | Refills: 3 | Status: SHIPPED | OUTPATIENT
Start: 2023-09-27

## 2023-11-07 ENCOUNTER — OFFICE VISIT (OUTPATIENT)
Dept: FAMILY MEDICINE CLINIC | Facility: CLINIC | Age: 48
End: 2023-11-07
Payer: MEDICARE

## 2023-11-07 DIAGNOSIS — K21.9 GASTROESOPHAGEAL REFLUX DISEASE WITHOUT ESOPHAGITIS: ICD-10-CM

## 2023-11-07 DIAGNOSIS — Z00.00 HEALTHCARE MAINTENANCE: ICD-10-CM

## 2023-11-07 DIAGNOSIS — J30.9 CHRONIC ALLERGIC RHINITIS: Primary | ICD-10-CM

## 2023-11-07 DIAGNOSIS — M54.59 MECHANICAL LOW BACK PAIN: ICD-10-CM

## 2023-11-07 DIAGNOSIS — Z23 ENCOUNTER FOR IMMUNIZATION: ICD-10-CM

## 2023-11-07 DIAGNOSIS — N39.41 URGE URINARY INCONTINENCE: ICD-10-CM

## 2023-11-07 DIAGNOSIS — M47.818 SI JOINT ARTHRITIS: ICD-10-CM

## 2023-11-07 DIAGNOSIS — I10 ESSENTIAL HYPERTENSION: ICD-10-CM

## 2023-11-07 DIAGNOSIS — G47.33 OSA (OBSTRUCTIVE SLEEP APNEA): ICD-10-CM

## 2023-11-07 DIAGNOSIS — R74.8 ELEVATED LIVER ENZYMES: ICD-10-CM

## 2023-11-07 DIAGNOSIS — F41.0 PANIC ATTACKS: ICD-10-CM

## 2023-11-07 DIAGNOSIS — E78.2 MIXED HYPERLIPIDEMIA: ICD-10-CM

## 2023-11-07 DIAGNOSIS — E11.9 TYPE 2 DIABETES MELLITUS WITHOUT COMPLICATION, WITHOUT LONG-TERM CURRENT USE OF INSULIN: ICD-10-CM

## 2023-11-07 DIAGNOSIS — R10.2 PELVIC PAIN: ICD-10-CM

## 2023-11-07 DIAGNOSIS — K59.09 OTHER CONSTIPATION: ICD-10-CM

## 2023-11-07 NOTE — PROGRESS NOTES
Subjective   Iraida Heard is a 48 y.o. female.     Chief Complaint  She returns for a scheduled reassessment of multiple medical problems including chronic low back pain, pelvic pain, type 2 diabetes mellitus, lipidemia, and essential hypertension    History of Present Illness     Chronic Low Back Pain  Non-contrast CT of the pelvis performed on 7/31/2023 revealed moderate degenerative changes of both SI joints with sclerosis, osteophytes, and vacuum phenomena as well as marked degenerative disc disease at L4-5 and mild osteoarthritis of both hips.  She underwent a right SI joint fusion by Dr. Byrne on 8/21/2023.  With this, she has noted a significant improvement in her low back pain.  There has been no change in the quality nor any new associated symptoms.  She denies any changes in her strength, sensation, or bowel/bladder control.  She is scheduled to undergo a reassessment with him on 11/9/2023    Pelvic Pain  She denies any recent abdominal or pelvic pain. She admits to intermittent urinary frequency and urgency, but denies any dysuria, hematuria, suprapubic pressure, or vaginal bleeding. She continues to deny any nausea, vomiting, change in her bowel habits, hematochezia, or melena. Contrast CT of the abdomen/pelvis performed on 2/23/23 was reported as showing mild enlargement of the left ovary with a left adnexal cyst.  She continues to be followed by gynecology.      Type 2 Diabetes Mellitus  She has been following her diet and exercise plan more carefully.  She did well with semaglutide but simply cannot afford the co-pay.  She has had no recent labs     Hyperlipidemia  She remains on rosuvastatin with no apparent side effects.    Essential Hypertension  She has been off medication for some time.  She continues to deny any chest pain, palpitations, shortness of breath, lightheadedness, or lower extremity edema    The following portions of the patient's history were reviewed and updated as  appropriate: allergies, current medications, past medical history, past social history, and problem list.    Review of Systems   Constitutional:  Positive for fatigue. Negative for appetite change, chills, fever and unexpected weight change.   HENT:  Positive for congestion and rhinorrhea. Negative for ear pain, postnasal drip, sinus pressure, sneezing and sore throat.    Eyes:  Negative for visual disturbance.   Respiratory:  Negative for cough, shortness of breath and wheezing.    Cardiovascular:  Negative for chest pain, palpitations and leg swelling.   Gastrointestinal:  Negative for abdominal pain, blood in stool, constipation, diarrhea, nausea and vomiting.   Endocrine:        Intermittent hot flashes   Genitourinary:  Positive for frequency and urgency. Negative for difficulty urinating, dysuria, hematuria and pelvic pain.   Musculoskeletal:  Positive for back pain. Negative for arthralgias, joint swelling, myalgias and neck pain.   Skin:  Negative for rash.   Neurological:  Negative for weakness, numbness and headaches.   Psychiatric/Behavioral:  Positive for sleep disturbance. Negative for dysphoric mood and suicidal ideas. The patient is nervous/anxious.      Objective   Physical Exam  Constitutional:       General: She is not in acute distress.     Appearance: Normal appearance. She is well-developed. She is not diaphoretic.      Comments: Accompanied by her mother.  Bright and in good spirits. No apparent distress. No pallor, jaundice, diaphoresis, or cyanosis.   HENT:      Head: Atraumatic.      Right Ear: Tympanic membrane, ear canal and external ear normal.      Left Ear: Tympanic membrane, ear canal and external ear normal.      Mouth/Throat:      Lips: No lesions.      Mouth: Mucous membranes are moist. No oral lesions.      Pharynx: No oropharyngeal exudate or posterior oropharyngeal erythema.   Eyes:      General: Lids are normal.      Extraocular Movements: Extraocular movements intact.       Conjunctiva/sclera: Conjunctivae normal.      Pupils: Pupils are equal.   Neck:      Thyroid: No thyroid mass or thyromegaly.      Vascular: No carotid bruit or JVD.      Trachea: Trachea normal. No tracheal deviation.   Cardiovascular:      Rate and Rhythm: Normal rate and regular rhythm.      Heart sounds: Normal heart sounds, S1 normal and S2 normal. No murmur heard.     No gallop.   Pulmonary:      Effort: Pulmonary effort is normal.      Breath sounds: Normal breath sounds.   Abdominal:      General: Bowel sounds are normal. There is no distension.   Musculoskeletal:      Lumbar back: Negative right straight leg raise test and negative left straight leg raise test.      Right lower leg: No edema.      Left lower leg: No edema.   Lymphadenopathy:      Head:      Right side of head: No submental, submandibular, tonsillar, preauricular, posterior auricular or occipital adenopathy.      Left side of head: No submental, submandibular, tonsillar, preauricular, posterior auricular or occipital adenopathy.      Cervical: No cervical adenopathy.      Upper Body:      Right upper body: No supraclavicular adenopathy.      Left upper body: No supraclavicular adenopathy.   Skin:     General: Skin is warm.      Coloration: Skin is not cyanotic, jaundiced or pale.      Findings: No rash.      Nails: There is no clubbing.   Neurological:      Mental Status: She is alert and oriented to person, place, and time.      Cranial Nerves: No cranial nerve deficit, dysarthria or facial asymmetry.      Sensory: No sensory deficit.      Motor: No weakness or tremor.      Coordination: Coordination normal.      Gait: Gait normal.   Psychiatric:         Attention and Perception: Attention normal.         Mood and Affect: Mood normal.         Speech: Speech normal.         Behavior: Behavior normal.         Thought Content: Thought content normal.       Assessment & Plan   Problems Addressed this Visit          Allergies and Adverse  Reactions    Chronic allergic rhinitis        Cardiac and Vasculature    Essential hypertension   Hypertension:  BP remains at goal off medication . Evidence of target organ damage: none.  Encouraged to continue to work on diet and exercise plan.   Will continue to monitor    Relevant Orders    CBC & Differential (Completed)    Comprehensive Metabolic Panel (Completed)    TSH (Completed)    Mixed hyperlipidemia  As above.   Continue current medication.    Relevant Orders    Comprehensive Metabolic Panel (Completed)    Lipid Panel (Completed)    TSH (Completed)       Endocrine and Metabolic    Type 2 diabetes mellitus, without long-term current use of insulin  Diabetes mellitus Type II, under unknown control.   Encouraged to continue to pursue ADA diet  Encouraged aerobic exercise.  Updated labs drawn.    Relevant Orders    Comprehensive Metabolic Panel (Completed)    TSH (Completed)    Hemoglobin A1c (Completed)    MicroAlbumin, Urine, Random - Urine, Clean Catch       Gastrointestinal Abdominal     Elevated liver enzymes  Will continue to monitor    Relevant Orders    Comprehensive Metabolic Panel (Completed)    Gastroesophageal reflux disease without esophagitis    Relevant Orders    CBC & Differential (Completed)    Other constipation    Pelvic pain  Resolved  Encouraged to report if this should change.  Follow up with gynecology       Genitourinary and Reproductive     Urge urinary incontinence  Follow up with gynecology       Health Encounters    Healthcare maintenance  Flu shot and hepatitis B #2 administered.  Patient has already received an updated COVID-19 shot this fall  Will administer hepatitis B #3 and arrange a mammogram at her return    Relevant Orders    Fluzone (or Fluarix & Flulaval for VFC) >6mos (Completed)    Hepatitis B Vaccine Adult IM (Completed)       Mental Health    Panic attacks    Relevant Orders    TSH (Completed)       Musculoskeletal and Injuries    Mechanical low back pain  With  recent right SI joint fusion  HLA-B27 drawn with today's labs  Follow up with orthopedic surgery    Relevant Orders    HLA-B27 Antigen       Sleep    KATALINA (obstructive sleep apnea)    Relevant Orders    CBC & Differential (Completed)     Diagnoses         Codes Comments    Chronic allergic rhinitis    -  Primary ICD-10-CM: J30.9  ICD-9-CM: 477.9     Mixed hyperlipidemia     ICD-10-CM: E78.2  ICD-9-CM: 272.2     Essential hypertension     ICD-10-CM: I10  ICD-9-CM: 401.9     Type 2 diabetes mellitus without complication, without long-term current use of insulin     ICD-10-CM: E11.9  ICD-9-CM: 250.00     Pelvic pain     ICD-10-CM: R10.2  ICD-9-CM: [id number]     Other constipation     ICD-10-CM: K59.09  ICD-9-CM: 564.09     Gastroesophageal reflux disease without esophagitis     ICD-10-CM: K21.9  ICD-9-CM: 530.81     Elevated liver enzymes     ICD-10-CM: R74.8  ICD-9-CM: 790.5     Urge urinary incontinence     ICD-10-CM: N39.41  ICD-9-CM: 788.31     Healthcare maintenance     ICD-10-CM: Z00.00  ICD-9-CM: V70.0     Panic attacks     ICD-10-CM: F41.0  ICD-9-CM: 300.01     Mechanical low back pain     ICD-10-CM: M54.59  ICD-9-CM: 724.2     KATALINA (obstructive sleep apnea)     ICD-10-CM: G47.33  ICD-9-CM: 327.23     Encounter for immunization     ICD-10-CM: Z23  ICD-9-CM: V03.89     SI joint arthritis     ICD-10-CM: M47.818  ICD-9-CM: 721.3

## 2023-11-08 VITALS
WEIGHT: 190 LBS | RESPIRATION RATE: 14 BRPM | OXYGEN SATURATION: 96 % | HEART RATE: 67 BPM | TEMPERATURE: 98.6 F | SYSTOLIC BLOOD PRESSURE: 125 MMHG | BODY MASS INDEX: 30.53 KG/M2 | HEIGHT: 66 IN | DIASTOLIC BLOOD PRESSURE: 68 MMHG

## 2023-11-08 DIAGNOSIS — E11.9 TYPE 2 DIABETES MELLITUS WITHOUT COMPLICATION, WITHOUT LONG-TERM CURRENT USE OF INSULIN: Primary | ICD-10-CM

## 2023-11-08 LAB
ALBUMIN SERPL-MCNC: 4.5 G/DL (ref 3.5–5.2)
ALBUMIN/GLOB SERPL: 1.9 G/DL
ALP SERPL-CCNC: 84 U/L (ref 39–117)
ALT SERPL-CCNC: 27 U/L (ref 1–33)
AST SERPL-CCNC: 22 U/L (ref 1–32)
BASOPHILS # BLD AUTO: 0.04 10*3/MM3 (ref 0–0.2)
BASOPHILS NFR BLD AUTO: 0.5 % (ref 0–1.5)
BILIRUB SERPL-MCNC: 0.3 MG/DL (ref 0–1.2)
BUN SERPL-MCNC: 9 MG/DL (ref 6–20)
BUN/CREAT SERPL: 12.2 (ref 7–25)
CALCIUM SERPL-MCNC: 8.7 MG/DL (ref 8.6–10.5)
CHLORIDE SERPL-SCNC: 103 MMOL/L (ref 98–107)
CHOLEST SERPL-MCNC: 149 MG/DL (ref 0–200)
CO2 SERPL-SCNC: 27.6 MMOL/L (ref 22–29)
CREAT SERPL-MCNC: 0.74 MG/DL (ref 0.57–1)
EGFRCR SERPLBLD CKD-EPI 2021: 100.6 ML/MIN/1.73
EOSINOPHIL # BLD AUTO: 0.14 10*3/MM3 (ref 0–0.4)
EOSINOPHIL NFR BLD AUTO: 1.9 % (ref 0.3–6.2)
ERYTHROCYTE [DISTWIDTH] IN BLOOD BY AUTOMATED COUNT: 12.2 % (ref 12.3–15.4)
GLOBULIN SER CALC-MCNC: 2.4 GM/DL
GLUCOSE SERPL-MCNC: 160 MG/DL (ref 65–99)
HBA1C MFR BLD: 7.3 % (ref 4.8–5.6)
HCT VFR BLD AUTO: 42.2 % (ref 34–46.6)
HDLC SERPL-MCNC: 39 MG/DL (ref 40–60)
HGB BLD-MCNC: 14.2 G/DL (ref 12–15.9)
IMM GRANULOCYTES # BLD AUTO: 0.02 10*3/MM3 (ref 0–0.05)
IMM GRANULOCYTES NFR BLD AUTO: 0.3 % (ref 0–0.5)
LDLC SERPL CALC-MCNC: 78 MG/DL (ref 0–100)
LYMPHOCYTES # BLD AUTO: 2.29 10*3/MM3 (ref 0.7–3.1)
LYMPHOCYTES NFR BLD AUTO: 31.2 % (ref 19.6–45.3)
MCH RBC QN AUTO: 31.8 PG (ref 26.6–33)
MCHC RBC AUTO-ENTMCNC: 33.6 G/DL (ref 31.5–35.7)
MCV RBC AUTO: 94.4 FL (ref 79–97)
MICROALBUMIN UR-MCNC: 7 UG/ML
MONOCYTES # BLD AUTO: 0.58 10*3/MM3 (ref 0.1–0.9)
MONOCYTES NFR BLD AUTO: 7.9 % (ref 5–12)
NEUTROPHILS # BLD AUTO: 4.26 10*3/MM3 (ref 1.7–7)
NEUTROPHILS NFR BLD AUTO: 58.2 % (ref 42.7–76)
NRBC BLD AUTO-RTO: 0 /100 WBC (ref 0–0.2)
PLATELET # BLD AUTO: 306 10*3/MM3 (ref 140–450)
POTASSIUM SERPL-SCNC: 4.5 MMOL/L (ref 3.5–5.2)
PROT SERPL-MCNC: 6.9 G/DL (ref 6–8.5)
RBC # BLD AUTO: 4.47 10*6/MM3 (ref 3.77–5.28)
SODIUM SERPL-SCNC: 140 MMOL/L (ref 136–145)
TRIGL SERPL-MCNC: 190 MG/DL (ref 0–150)
TSH SERPL DL<=0.005 MIU/L-ACNC: 1.99 UIU/ML (ref 0.27–4.2)
VLDLC SERPL CALC-MCNC: 32 MG/DL (ref 5–40)
WBC # BLD AUTO: 7.33 10*3/MM3 (ref 3.4–10.8)

## 2023-11-08 RX ORDER — METFORMIN HYDROCHLORIDE 500 MG/1
TABLET, EXTENDED RELEASE ORAL
Qty: 180 TABLET | Refills: 0 | Status: SHIPPED | OUTPATIENT
Start: 2023-11-08

## 2023-11-13 LAB — HLA-B27 QL NAA+PROBE: NEGATIVE

## 2023-12-04 RX ORDER — DOXYCYCLINE HYCLATE 100 MG/1
100 CAPSULE ORAL 2 TIMES DAILY
Qty: 20 CAPSULE | Refills: 0 | Status: SHIPPED | OUTPATIENT
Start: 2023-12-04 | End: 2023-12-14

## 2024-01-05 ENCOUNTER — TRANSCRIBE ORDERS (OUTPATIENT)
Dept: ADMINISTRATIVE | Facility: HOSPITAL | Age: 49
End: 2024-01-05
Payer: MEDICARE

## 2024-01-05 DIAGNOSIS — Z12.31 SCREENING MAMMOGRAM, ENCOUNTER FOR: Primary | ICD-10-CM

## 2024-01-13 DIAGNOSIS — J30.9 CHRONIC ALLERGIC RHINITIS: ICD-10-CM

## 2024-01-15 RX ORDER — MONTELUKAST SODIUM 10 MG/1
10 TABLET ORAL DAILY
Qty: 90 TABLET | Refills: 3 | Status: SHIPPED | OUTPATIENT
Start: 2024-01-15

## 2024-01-15 RX ORDER — DICYCLOMINE HCL 20 MG
20 TABLET ORAL 2 TIMES DAILY
Qty: 180 TABLET | Refills: 3 | Status: SHIPPED | OUTPATIENT
Start: 2024-01-15

## 2024-02-08 ENCOUNTER — OFFICE VISIT (OUTPATIENT)
Dept: FAMILY MEDICINE CLINIC | Facility: CLINIC | Age: 49
End: 2024-02-08
Payer: MEDICARE

## 2024-02-08 VITALS
RESPIRATION RATE: 14 BRPM | HEIGHT: 66 IN | TEMPERATURE: 98.6 F | BODY MASS INDEX: 30.37 KG/M2 | WEIGHT: 189 LBS | OXYGEN SATURATION: 94 % | HEART RATE: 89 BPM | SYSTOLIC BLOOD PRESSURE: 126 MMHG | DIASTOLIC BLOOD PRESSURE: 70 MMHG

## 2024-02-08 DIAGNOSIS — E11.9 TYPE 2 DIABETES MELLITUS WITHOUT COMPLICATION, WITHOUT LONG-TERM CURRENT USE OF INSULIN: ICD-10-CM

## 2024-02-08 DIAGNOSIS — K21.9 GASTROESOPHAGEAL REFLUX DISEASE WITHOUT ESOPHAGITIS: ICD-10-CM

## 2024-02-08 DIAGNOSIS — M79.672 LEFT FOOT PAIN: ICD-10-CM

## 2024-02-08 DIAGNOSIS — I10 ESSENTIAL HYPERTENSION: ICD-10-CM

## 2024-02-08 DIAGNOSIS — N83.202 LEFT OVARIAN CYST: ICD-10-CM

## 2024-02-08 DIAGNOSIS — M54.59 MECHANICAL LOW BACK PAIN: ICD-10-CM

## 2024-02-08 DIAGNOSIS — Z00.00 HEALTHCARE MAINTENANCE: ICD-10-CM

## 2024-02-08 DIAGNOSIS — G47.33 OSA (OBSTRUCTIVE SLEEP APNEA): ICD-10-CM

## 2024-02-08 DIAGNOSIS — F41.0 PANIC ATTACKS: ICD-10-CM

## 2024-02-08 DIAGNOSIS — R74.8 ELEVATED LIVER ENZYMES: ICD-10-CM

## 2024-02-08 DIAGNOSIS — K59.09 OTHER CONSTIPATION: ICD-10-CM

## 2024-02-08 DIAGNOSIS — R10.2 PELVIC PAIN: ICD-10-CM

## 2024-02-08 DIAGNOSIS — E78.2 MIXED HYPERLIPIDEMIA: ICD-10-CM

## 2024-02-08 DIAGNOSIS — R10.32 LLQ PAIN: ICD-10-CM

## 2024-02-08 DIAGNOSIS — J30.9 CHRONIC ALLERGIC RHINITIS: Primary | ICD-10-CM

## 2024-02-08 DIAGNOSIS — Z23 ENCOUNTER FOR IMMUNIZATION: ICD-10-CM

## 2024-02-08 DIAGNOSIS — N39.41 URGE URINARY INCONTINENCE: ICD-10-CM

## 2024-02-08 NOTE — PROGRESS NOTES
Subjective   Iraida Heard is a 48 y.o. female.     Chief Complaint  She returns for a scheduled reassessment of multiple medical problems including chronic low back pain, pelvic pain, type 2 diabetes mellitus, hyperlipidemia, essential hypertension, and recent left foot pain    History of Present Illness     Left Foot Pain  She returns with a 3 to 4 month history of intermittent left foot pain.  She denies any strain or trauma, and has had no changes in her activities or footwear.  She describes the pain as a sharp distal lateral ache with weightbearing.  This does not radiate elsewhere and has been unassociated with any other symptoms.  There is no history of any stiffness or swelling and she denies any numbness or tingling.    Chronic Low Back Pain  Non-contrast CT of the pelvis performed on 7/31/2023 revealed moderate degenerative changes of both SI joints with sclerosis, osteophytes, and vacuum phenomena as well as marked degenerative disc disease at L4-5 and mild osteoarthritis of both hips.  She underwent a right SI joint fusion by Dr. Byrne on 8/21/2023.  She has noted a continued improvement in her low back pain.  There has been no change in the quality nor any new associated symptoms.  She denies any changes in her strength, sensation, or bowel/bladder control.  She underwent a reassessment with him on 11/9/2023 and was recommended a 9-month follow-up    Pelvic Pain  She experienced another episode of left pelvic pain since last here. She continues to experience intermittent urinary frequency and urgency, but denies any dysuria, hematuria, suprapubic pressure, or vaginal bleeding. She has had intermittent diarrhea since starting on metformin.  She continues to deny any nausea, vomiting, hematochezia, or melena. Contrast CT of the abdomen/pelvis performed on 2/23/23 was reported as showing mild enlargement of the left ovary with a left adnexal cyst.  She underwent a gynecology reassessment since last  here, but would like a second opinion    Type 2 Diabetes Mellitus  She has been following her diet and exercise plan more carefully.  She is taking metformin, but has had diarrhea since starting on it.  She did well with semaglutide but could not afford the co-pay.   Lab Results   Component Value Date    HGBA1C 7.30 (H) 11/07/2023     Lab Results   Component Value Date    MICROALBUR 7.0 11/07/2023      Hyperlipidemia  She remains on rosuvastatin with no apparent side effects.  Lab Results   Component Value Date    CHLPL 149 11/07/2023    TRIG 190 (H) 11/07/2023    HDL 39 (L) 11/07/2023    LDL 78 11/07/2023     Essential Hypertension  She has been off medication for some time.  She continues to deny any chest pain, palpitations, shortness of breath, lightheadedness, or lower extremity edema  Lab Results   Component Value Date    GLUCOSE 160 (H) 11/07/2023    BUN 9 11/07/2023    CREATININE 0.74 11/07/2023    EGFRRESULT 100.6 11/07/2023    EGFR 92.2 02/23/2023    BCR 12.2 11/07/2023    K 4.5 11/07/2023    CO2 27.6 11/07/2023    CALCIUM 8.7 11/07/2023    PROTENTOTREF 6.9 11/07/2023    ALBUMIN 4.5 11/07/2023    BILITOT 0.3 11/07/2023    AST 22 11/07/2023    ALT 27 11/07/2023     Lab Results   Component Value Date    ALKPHOS 84 11/07/2023    ALKPHOS 63 02/23/2023     Labs  HLA B27 negative  Lab Results   Component Value Date    TSH 1.990 11/07/2023     The following portions of the patient's history were reviewed and updated as appropriate: allergies, current medications, past medical history, past social history, and problem list.    Review of Systems   Constitutional:  Positive for fatigue. Negative for chills and fever.   HENT:  Positive for congestion and rhinorrhea. Negative for ear pain, sore throat and voice change.    Eyes:  Negative for visual disturbance.   Respiratory:  Negative for cough, shortness of breath and wheezing.    Cardiovascular:  Negative for chest pain, palpitations and leg swelling.  "  Gastrointestinal:  Negative for abdominal pain, blood in stool, constipation, diarrhea, nausea and vomiting.   Genitourinary:  Positive for frequency, pelvic pain and urgency. Negative for dysuria, hematuria and vaginal bleeding.   Musculoskeletal:  Positive for arthralgias and back pain. Negative for joint swelling and myalgias.   Skin:  Negative for rash.   Neurological:  Negative for dizziness, weakness and numbness.   Psychiatric/Behavioral:  Positive for sleep disturbance. Negative for depressed mood. The patient is nervous/anxious.      /70   Pulse 89   Temp 98.6 °F (37 °C) (Temporal)   Resp 14   Ht 167.6 cm (65.98\")   Wt 85.7 kg (189 lb)   SpO2 94%   BMI 30.52 kg/m²     BMI is >= 30 and <35. (Class 1 Obesity). The following options were offered after discussion;: exercise counseling/recommendations and nutrition counseling/recommendations    Objective   Physical Exam  Constitutional:       General: She is not in acute distress.     Appearance: Normal appearance. She is well-developed. She is not diaphoretic.      Comments: Accompanied by her mother.  Bright and in good spirits.  Mild left antalgic gait.  No apparent distress at rest. No pallor, jaundice, diaphoresis, or cyanosis.   HENT:      Head: Atraumatic.      Right Ear: Tympanic membrane, ear canal and external ear normal.      Left Ear: Tympanic membrane, ear canal and external ear normal.      Mouth/Throat:      Lips: No lesions.      Mouth: Mucous membranes are moist. No oral lesions.      Pharynx: No oropharyngeal exudate or posterior oropharyngeal erythema.   Eyes:      General: Lids are normal.      Extraocular Movements: Extraocular movements intact.      Conjunctiva/sclera: Conjunctivae normal.      Pupils: Pupils are equal.   Neck:      Thyroid: No thyroid mass or thyromegaly.      Vascular: No carotid bruit or JVD.      Trachea: Trachea normal. No tracheal deviation.   Cardiovascular:      Rate and Rhythm: Normal rate and " regular rhythm.      Heart sounds: Normal heart sounds, S1 normal and S2 normal. No murmur heard.     No gallop.   Pulmonary:      Effort: Pulmonary effort is normal.      Breath sounds: Normal breath sounds.   Abdominal:      General: Bowel sounds are normal. There is no distension or abdominal bruit.      Palpations: Abdomen is soft. There is no hepatomegaly, splenomegaly or mass.      Tenderness: There is no abdominal tenderness.      Hernia: No hernia is present.   Musculoskeletal:      Lumbar back: Negative right straight leg raise test and negative left straight leg raise test.      Right lower leg: No edema.      Left lower leg: No edema.      Left foot: Tenderness (about the 5th MTP) present. No swelling, deformity or crepitus.   Lymphadenopathy:      Head:      Right side of head: No submental, submandibular, tonsillar, preauricular, posterior auricular or occipital adenopathy.      Left side of head: No submental, submandibular, tonsillar, preauricular, posterior auricular or occipital adenopathy.      Cervical: No cervical adenopathy.      Upper Body:      Right upper body: No supraclavicular adenopathy.      Left upper body: No supraclavicular adenopathy.   Skin:     General: Skin is warm.      Coloration: Skin is not cyanotic, jaundiced or pale.      Findings: No rash.      Nails: There is no clubbing.   Neurological:      Mental Status: She is alert and oriented to person, place, and time.      Cranial Nerves: No cranial nerve deficit, dysarthria or facial asymmetry.      Sensory: No sensory deficit.      Motor: No weakness or tremor.      Coordination: Coordination normal.      Gait: Gait normal.   Psychiatric:         Attention and Perception: Attention normal.         Mood and Affect: Mood normal.         Speech: Speech normal.         Behavior: Behavior normal.         Thought Content: Thought content normal.       Assessment & Plan   Problems Addressed this Visit          Allergies and Adverse  Reactions    Chronic allergic rhinitis        Cardiac and Vasculature    Essential hypertension   Hypertension:  BP remains at goal off medication . Evidence of target organ damage: none.  Encouraged to continue to work on diet and exercise plan.   Will continue to monitor BP and urine microalbumin levels    Relevant Orders    CBC & Differential    Comprehensive Metabolic Panel    Lipid Panel    Mixed hyperlipidemia  As above.   Continue current medication.    Relevant Orders    Comprehensive Metabolic Panel       Endocrine and Metabolic    Type 2 diabetes mellitus, without long-term current use of insulin  Diabetes mellitus Type II, under fair control.   Encouraged to continue to pursue ADA diet  Encouraged aerobic exercise.  Will look into whether patient might be able forward semaglutide at present  Scheduled for updated labs just prior to her return in 3 months    Relevant Orders    Comprehensive Metabolic Panel    Hemoglobin A1c    MicroAlbumin, Urine, Random - Urine, Clean Catch       Gastrointestinal Abdominal     Elevated liver enzymes    Gastroesophageal reflux disease without esophagitis    Relevant Orders    CBC & Differential    LLQ pain  With previous ovarian cyst  We will arrange a second gynecology opinion    Relevant Orders    Ambulatory Referral to Gynecology (Completed)    Other constipation    Relevant Orders    CBC & Differential                       Genitourinary and Reproductive     Left ovarian cyst  As above.    Relevant Orders    Ambulatory Referral to Gynecology (Completed)    Urge urinary incontinence       Health Encounters    Healthcare maintenance  Recommend hepatitis B #3.  Patient will return at her convenience as clinic supply exhausted  She is scheduled to undergo an updated mammogram tomorrow       Mental Health    Panic attacks       Musculoskeletal and Injuries    Left foot pain  Left fifth metatarsalgia  We will arrange plain films and a podiatry opinion    Relevant Orders     XR Foot 3+ View Left    Ambulatory Referral to Podiatry (Completed)    Mechanical low back pain  Improved post right SI joint fusion  Encouraged to report if this should change.  Follow up with orthopedic surgery       Sleep    KATALINA (obstructive sleep apnea)    Relevant Orders    CBC & Differential                Diagnoses         Codes Comments    Chronic allergic rhinitis    -  Primary ICD-10-CM: J30.9  ICD-9-CM: 477.9     Essential hypertension     ICD-10-CM: I10  ICD-9-CM: 401.9     Mixed hyperlipidemia     ICD-10-CM: E78.2  ICD-9-CM: 272.2     Gastroesophageal reflux disease without esophagitis     ICD-10-CM: K21.9  ICD-9-CM: 530.81     Other constipation     ICD-10-CM: K59.09  ICD-9-CM: 564.09     Urge urinary incontinence     ICD-10-CM: N39.41  ICD-9-CM: 788.31     Healthcare maintenance     ICD-10-CM: Z00.00  ICD-9-CM: V70.0     Panic attacks     ICD-10-CM: F41.0  ICD-9-CM: 300.01     Mechanical low back pain     ICD-10-CM: M54.59  ICD-9-CM: 724.2     KATALINA (obstructive sleep apnea)     ICD-10-CM: G47.33  ICD-9-CM: 327.23     Encounter for immunization     ICD-10-CM: Z23  ICD-9-CM: V03.89     Left foot pain     ICD-10-CM: M79.672  ICD-9-CM: 729.5     Type 2 diabetes mellitus without complication, without long-term current use of insulin     ICD-10-CM: E11.9  ICD-9-CM: 250.00     Pelvic pain     ICD-10-CM: R10.2  ICD-9-CM: [id number]     LLQ pain     ICD-10-CM: R10.32  ICD-9-CM: 789.04     Elevated liver enzymes     ICD-10-CM: R74.8  ICD-9-CM: 790.5     Left ovarian cyst     ICD-10-CM: N83.202  ICD-9-CM: 620.2

## 2024-02-09 ENCOUNTER — HOSPITAL ENCOUNTER (OUTPATIENT)
Dept: MAMMOGRAPHY | Facility: HOSPITAL | Age: 49
Discharge: HOME | End: 2024-02-09
Admitting: GENERAL PRACTICE
Payer: MEDICARE

## 2024-02-09 DIAGNOSIS — Z12.31 SCREENING MAMMOGRAM, ENCOUNTER FOR: ICD-10-CM

## 2024-02-09 PROCEDURE — 77067 SCR MAMMO BI INCL CAD: CPT

## 2024-02-09 PROCEDURE — 77063 BREAST TOMOSYNTHESIS BI: CPT | Performed by: RADIOLOGY

## 2024-02-09 PROCEDURE — 77067 SCR MAMMO BI INCL CAD: CPT | Performed by: RADIOLOGY

## 2024-02-09 PROCEDURE — 77063 BREAST TOMOSYNTHESIS BI: CPT

## 2024-02-13 ENCOUNTER — TELEPHONE (OUTPATIENT)
Dept: FAMILY MEDICINE CLINIC | Facility: CLINIC | Age: 49
End: 2024-02-13
Payer: MEDICARE

## 2024-02-13 DIAGNOSIS — E11.9 TYPE 2 DIABETES MELLITUS WITHOUT COMPLICATION, WITHOUT LONG-TERM CURRENT USE OF INSULIN: Primary | ICD-10-CM

## 2024-02-13 RX ORDER — SEMAGLUTIDE 0.68 MG/ML
0.25 INJECTION, SOLUTION SUBCUTANEOUS WEEKLY
Qty: 3 ML | Refills: 0 | Status: SHIPPED | OUTPATIENT
Start: 2024-02-13

## 2024-02-21 DIAGNOSIS — K21.9 GASTROESOPHAGEAL REFLUX DISEASE WITHOUT ESOPHAGITIS: ICD-10-CM

## 2024-02-21 RX ORDER — OMEPRAZOLE 20 MG/1
20 CAPSULE, DELAYED RELEASE ORAL DAILY
Qty: 90 CAPSULE | Refills: 3 | Status: SHIPPED | OUTPATIENT
Start: 2024-02-21

## 2024-03-12 DIAGNOSIS — E11.9 TYPE 2 DIABETES MELLITUS WITHOUT COMPLICATION, WITHOUT LONG-TERM CURRENT USE OF INSULIN: ICD-10-CM

## 2024-03-12 RX ORDER — SEMAGLUTIDE 0.68 MG/ML
0.5 INJECTION, SOLUTION SUBCUTANEOUS WEEKLY
Qty: 3 ML | Refills: 0 | Status: SHIPPED | OUTPATIENT
Start: 2024-03-12

## 2024-03-18 DIAGNOSIS — E78.2 MIXED HYPERLIPIDEMIA: ICD-10-CM

## 2024-03-18 RX ORDER — ROSUVASTATIN CALCIUM 20 MG/1
20 TABLET, COATED ORAL DAILY
Qty: 90 TABLET | Refills: 3 | Status: SHIPPED | OUTPATIENT
Start: 2024-03-18

## 2024-04-05 ENCOUNTER — OFFICE VISIT (OUTPATIENT)
Dept: FAMILY MEDICINE CLINIC | Facility: CLINIC | Age: 49
End: 2024-04-05
Payer: MEDICARE

## 2024-04-05 DIAGNOSIS — R74.8 ELEVATED LIVER ENZYMES: ICD-10-CM

## 2024-04-05 DIAGNOSIS — E11.9 TYPE 2 DIABETES MELLITUS WITHOUT COMPLICATION, WITHOUT LONG-TERM CURRENT USE OF INSULIN: Primary | ICD-10-CM

## 2024-04-05 DIAGNOSIS — Z23 ENCOUNTER FOR IMMUNIZATION: ICD-10-CM

## 2024-04-05 DIAGNOSIS — J30.9 CHRONIC ALLERGIC RHINITIS: ICD-10-CM

## 2024-04-05 DIAGNOSIS — Z00.00 HEALTHCARE MAINTENANCE: ICD-10-CM

## 2024-04-05 DIAGNOSIS — F41.0 PANIC ATTACKS: ICD-10-CM

## 2024-04-05 DIAGNOSIS — N83.202 LEFT OVARIAN CYST: ICD-10-CM

## 2024-04-05 DIAGNOSIS — R10.32 LLQ PAIN: ICD-10-CM

## 2024-04-05 DIAGNOSIS — G47.33 OSA (OBSTRUCTIVE SLEEP APNEA): ICD-10-CM

## 2024-04-05 DIAGNOSIS — R23.2 HOT FLASHES: ICD-10-CM

## 2024-04-05 DIAGNOSIS — I10 ESSENTIAL HYPERTENSION: ICD-10-CM

## 2024-04-05 DIAGNOSIS — K59.09 OTHER CONSTIPATION: ICD-10-CM

## 2024-04-05 DIAGNOSIS — M79.672 LEFT FOOT PAIN: ICD-10-CM

## 2024-04-05 DIAGNOSIS — E78.2 MIXED HYPERLIPIDEMIA: ICD-10-CM

## 2024-04-05 DIAGNOSIS — N39.41 URGE URINARY INCONTINENCE: ICD-10-CM

## 2024-04-05 DIAGNOSIS — R31.29 MICROSCOPIC HEMATURIA: ICD-10-CM

## 2024-04-05 DIAGNOSIS — K21.9 GASTROESOPHAGEAL REFLUX DISEASE WITHOUT ESOPHAGITIS: ICD-10-CM

## 2024-04-05 DIAGNOSIS — M54.59 MECHANICAL LOW BACK PAIN: ICD-10-CM

## 2024-04-05 NOTE — PROGRESS NOTES
The ABCs of the Annual Wellness Visit  Subsequent Medicare Wellness Visit    Subjective    Iraida Heard is a 49 y.o. female who presents for a Subsequent Medicare Wellness Visit.    The following portions of the patient's history were reviewed and   updated as appropriate: allergies, current medications, past family history, past medical history, past social history, past surgical history, and problem list.    Compared to one year ago, the patient feels her physical   health is the same.    Compared to one year ago, the patient feels her mental   health is the same.    Recent Hospitalizations:  She was not admitted to the hospital during the last year.     Current Medical Providers:  Patient Care Team:  Amari Mixon MD as PCP - General (Family Medicine)    Outpatient Medications Prior to Visit   Medication Sig Dispense Refill    betamethasone valerate (VALISONE) 0.1 % cream Apply 1 application  topically to the appropriate area as directed 2 (Two) Times a Day. 45 g 0    Blood Glucose Monitoring Suppl (True Metrix Air Glucose Meter) w/Device kit USE AS DIRECTED 1 kit 0    dicyclomine (BENTYL) 20 MG tablet TAKE 1 TABLET TWICE DAILY 180 tablet 3    FLUoxetine (PROzac) 20 MG capsule TAKE 1 CAPSULE EVERY MORNING 90 capsule 3    metFORMIN ER (GLUCOPHAGE-XR) 500 MG 24 hr tablet 1 po daily for 2 weeks, then 2 po daily afterward 180 tablet 0    montelukast (SINGULAIR) 10 MG tablet TAKE 1 TABLET EVERY DAY 90 tablet 3    omeprazole (priLOSEC) 20 MG capsule TAKE 1 CAPSULE EVERY DAY 90 capsule 3    rosuvastatin (CRESTOR) 20 MG tablet Take 1 tablet by mouth Daily. 90 tablet 3    Semaglutide,0.25 or 0.5MG/DOS, (Ozempic, 0.25 or 0.5 MG/DOSE,) 2 MG/3ML solution pen-injector Inject 0.5 mg under the skin into the appropriate area as directed 1 (One) Time Per Week. 3 mL 0    True Metrix Blood Glucose Test test strip TEST EVERY DAY  AS  INSTRUCTED 100 each 5    TRUEplus Lancets 33G misc TEST BLOOD SUGAR EVERY  each  "3     No facility-administered medications prior to visit.     No opioid medication identified on active medication list. I have reviewed chart for other potential  high risk medication/s and harmful drug interactions in the elderly.        Aspirin is not on active medication list.  Aspirin use is not indicated based on review of current medical condition/s. Risk of harm outweighs potential benefits.  .    Patient Active Problem List   Diagnosis    Essential hypertension    Nonsustained ventricular tachycardia    Mixed hyperlipidemia    KATALINA (obstructive sleep apnea)    Panic attacks    Hot flashes    Healthcare maintenance    Mechanical low back pain    Chronic allergic rhinitis    Type 2 diabetes mellitus, without long-term current use of insulin    Encounter for immunization    Gastroesophageal reflux disease without esophagitis    Microscopic hematuria    Left ovarian cyst    Other constipation    Elevated liver enzymes    Urge urinary incontinence    LLQ pain    Left foot pain     Advance Care Planning   Advance Care Planning     Advance Directive is not on file.  ACP discussion was held with the patient during this visit. Patient does not have an advance directive, information provided.     Objective    Vitals:    04/05/24 1340   BP: 125/63   Pulse: 92   Resp: 14   Temp: 98.6 °F (37 °C)   TempSrc: Temporal   SpO2: 94%   Weight: 83.9 kg (185 lb)   Height: 167.6 cm (65.98\")     Estimated body mass index is 29.88 kg/m² as calculated from the following:    Height as of this encounter: 167.6 cm (65.98\").    Weight as of this encounter: 83.9 kg (185 lb).    Does the patient have evidence of cognitive impairment? No        HEALTH RISK ASSESSMENT    Smoking Status:  Social History     Tobacco Use   Smoking Status Never    Passive exposure: Never   Smokeless Tobacco Never     Alcohol Consumption:  Social History     Substance and Sexual Activity   Alcohol Use Never     Fall Risk Screen:  ALDAIRADI Fall Risk Assessment was " completed, and patient is at LOW risk for falls.Assessment completed on:2024    Depression Screenin/8/2024     1:25 PM   PHQ-2/PHQ-9 Depression Screening   Little Interest or Pleasure in Doing Things 0-->not at all   Feeling Down, Depressed or Hopeless 0-->not at all   PHQ-9: Brief Depression Severity Measure Score 0     Health Habits and Functional and Cognitive Screenin/5/2024     1:41 PM   Functional & Cognitive Status   Do you have difficulty preparing food and eating? No   Do you have difficulty bathing yourself, getting dressed or grooming yourself? No   Do you have difficulty using the toilet? No   Do you have difficulty moving around from place to place? No   Do you have trouble with steps or getting out of a bed or a chair? No   Current Diet Well Balanced Diet   Dental Exam Not up to date   Eye Exam Up to date   Exercise (times per week) 7 times per week   Current Exercises Include House Cleaning;Walking;Yard Work   Do you need help using the phone?  No   Are you deaf or do you have serious difficulty hearing?  No   Do you need help to go to places out of walking distance? No   Do you need help shopping? No   Do you need help preparing meals?  No   Do you need help with housework?  No   Do you need help with laundry? No   Do you need help taking your medications? No   Do you need help managing money? No   Do you ever drive or ride in a car without wearing a seat belt? No   Have you felt unusual stress, anger or loneliness in the last month? No   Who do you live with? Spouse   If you need help, do you have trouble finding someone available to you? No   Have you been bothered in the last four weeks by sexual problems? No   Do you have difficulty concentrating, remembering or making decisions? No     Age-appropriate Screening Schedule:  Refer to the list below for future screening recommendations based on patient's age, sex and/or medical conditions. Orders for these recommended tests are  listed in the plan section. The patient has been provided with a written plan.    Health Maintenance   Topic Date Due    DXA SCAN  Never done    DIABETIC EYE EXAM  Never done    DIABETIC FOOT EXAM  Never done    HEMOGLOBIN A1C  05/07/2024    INFLUENZA VACCINE  08/01/2024    LIPID PANEL  11/07/2024    URINE MICROALBUMIN  11/07/2024    BMI FOLLOWUP  03/12/2025    ANNUAL WELLNESS VISIT  04/05/2025    COLORECTAL CANCER SCREENING  08/13/2025    TDAP/TD VACCINES (2 - Td or Tdap) 10/27/2027    HEPATITIS C SCREENING  Completed    Hepatitis B  Completed    COVID-19 Vaccine  Completed    Pneumococcal Vaccine 0-64  Completed        CMS Preventative Services Quick Reference  Risk Factors Identified During Encounter  Chronic Pain: Natural history and expected course discussed. Questions answered.  Depression/Dysphoria: Current medication is effective, no change recommended  Immunizations Discussed/Encouraged: Hepatitis B Vaccine/Series  The above risks/problems have been discussed with the patient.  Pertinent information has been shared with the patient in the After Visit Summary.  An After Visit Summary and PPPS were made available to the patient.    Follow Up:   Next Medicare Wellness visit to be scheduled in 1 year.       Additional E&M Note during same encounter follows:  Patient has multiple medical problems which are significant and separately identifiable that require additional work above and beyond the Medicare Wellness Visit.      Chief Complaint  She returns for a scheduled reassessment of multiple medical problems including chronic low back pain, pelvic pain, type 2 diabetes mellitus, hyperlipidemia, essential hypertension, and left foot pain    Subjective        HPI    Left Foot Pain  She continues to experience intermittent left foot pain.  She does not recall any strain or trauma, and has had no changes in her activities or footwear.  She describes the pain as a sharp distal lateral ache with weightbearing.  This  does not radiate elsewhere and remains unassociated with any other symptoms.  There is no history of any stiffness or swelling and she denies any numbness or tingling.  Referral has been made to podiatry, but an appointment has yet to be finalized    Chronic Low Back Pain  Non-contrast CT of the pelvis performed on 7/31/2023 revealed moderate degenerative changes of both SI joints with sclerosis, osteophytes, and vacuum phenomena as well as marked degenerative disc disease at L4-5 and mild osteoarthritis of both hips.  She underwent a right SI joint fusion by Dr. Byrne on 8/21/2023.  She has noted a continued improvement in her low back pain.  There has been no change in the quality nor any new associated symptoms.  She continues to deny any changes in her strength, sensation, or bowel/bladder control.  She underwent a reassessment with him on 11/9/2023 and was recommended a 9-month follow-up    Pelvic Pain  She continues to experience intermittent left pelvic pain. She continues to experience intermittent urinary frequency and urgency, but there is no history of any dysuria, hematuria, suprapubic pressure, or vaginal bleeding. She has had intermittent diarrhea since starting on metformin.  She continues to deny any nausea, vomiting, hematochezia, or melena. Contrast CT of the abdomen/pelvis performed on 2/23/23 was reported as showing mild enlargement of the left ovary with a left adnexal cyst.  She is scheduled to undergo a second gynecology opinion in [place] next week    Type 2 Diabetes Mellitus  She has been following her diet and exercise plan more carefully.  She is taking metformin, but has had diarrhea since starting on it.  She resume semaglutide since last here, but felt it exacerbated her left pelvic pain and discontinued it.       Hyperlipidemia  She remains on rosuvastatin with no apparent side effects.    Essential Hypertension  She has been off medication for some time.  She continues to deny any  "chest pain, palpitations, shortness of breath, lightheadedness, or lower extremity edema    Review of Systems   Constitutional:  Positive for fatigue. Negative for appetite change, chills, diaphoresis and fever.   HENT:  Positive for congestion, postnasal drip and rhinorrhea. Negative for ear pain, sinus pressure, sneezing, sore throat, tinnitus and voice change.    Eyes:  Negative for visual disturbance.   Respiratory:  Negative for cough, shortness of breath and wheezing.    Cardiovascular:  Negative for chest pain, palpitations and leg swelling.   Gastrointestinal:  Negative for abdominal pain, blood in stool, constipation, diarrhea, nausea and vomiting.   Endocrine: Negative for polydipsia and polyuria.   Genitourinary:  Positive for frequency, pelvic pain and urgency. Negative for dysuria and hematuria.   Musculoskeletal:  Positive for arthralgias and back pain. Negative for joint swelling and myalgias.   Skin:  Negative for rash.   Neurological:  Negative for weakness, numbness and confusion.   Psychiatric/Behavioral:  Positive for sleep disturbance. Negative for decreased concentration, dysphoric mood and suicidal ideas. The patient is nervous/anxious (stressed at times).      Objective   Vital Signs:  /63   Pulse 92   Temp 98.6 °F (37 °C) (Temporal)   Resp 14   Ht 167.6 cm (65.98\")   Wt 83.9 kg (185 lb)   SpO2 94%   BMI 29.88 kg/m²     Physical Exam  Constitutional:       General: She is not in acute distress.     Appearance: Normal appearance. She is well-developed. She is not diaphoretic.      Comments: Accompanied by her mother.  Bright and in good spirits. No apparent distress. No pallor, jaundice, diaphoresis, or cyanosis.   HENT:      Head: Atraumatic.      Right Ear: Tympanic membrane, ear canal and external ear normal.      Left Ear: Tympanic membrane, ear canal and external ear normal.      Mouth/Throat:      Lips: No lesions.      Mouth: Mucous membranes are moist. No oral lesions.     "  Pharynx: No oropharyngeal exudate or posterior oropharyngeal erythema.   Eyes:      General: Lids are normal.      Extraocular Movements: Extraocular movements intact.      Conjunctiva/sclera: Conjunctivae normal.      Pupils: Pupils are equal.   Neck:      Thyroid: No thyroid mass or thyromegaly.      Vascular: No carotid bruit or JVD.      Trachea: Trachea normal. No tracheal deviation.   Cardiovascular:      Rate and Rhythm: Normal rate and regular rhythm.      Heart sounds: Normal heart sounds, S1 normal and S2 normal. No murmur heard.     No gallop.   Pulmonary:      Effort: Pulmonary effort is normal.      Breath sounds: Normal breath sounds.   Abdominal:      General: Bowel sounds are normal. There is no distension or abdominal bruit.      Palpations: Abdomen is soft. There is no hepatomegaly, splenomegaly or mass.      Tenderness: There is no abdominal tenderness.      Hernia: No hernia is present.   Musculoskeletal:      Lumbar back: Negative right straight leg raise test and negative left straight leg raise test.      Right lower leg: No edema.      Left lower leg: No edema.   Lymphadenopathy:      Head:      Right side of head: No submental, submandibular, tonsillar, preauricular, posterior auricular or occipital adenopathy.      Left side of head: No submental, submandibular, tonsillar, preauricular, posterior auricular or occipital adenopathy.      Cervical: No cervical adenopathy.      Upper Body:      Right upper body: No supraclavicular adenopathy.      Left upper body: No supraclavicular adenopathy.   Skin:     General: Skin is warm.      Coloration: Skin is not cyanotic, jaundiced or pale.      Findings: No rash.      Nails: There is no clubbing.   Neurological:      Mental Status: She is alert and oriented to person, place, and time.      Cranial Nerves: No cranial nerve deficit, dysarthria or facial asymmetry.      Sensory: No sensory deficit.      Motor: No weakness or tremor.       Coordination: Coordination normal.      Gait: Gait normal.   Psychiatric:         Attention and Perception: Attention normal.         Mood and Affect: Mood normal.         Speech: Speech normal.         Behavior: Behavior normal.         Thought Content: Thought content normal.           Assessment and Plan   Diagnoses and all orders for this visit:    1. Type 2 diabetes mellitus without complication, without long-term current use of insulin Diabetes mellitus Type II, under fair control.   Reviewed ADA diet.  Encouraged to continue to pursue ADA diet  Continue current medication  Patient will try to resume semaglutide at a later date  Updated labs will be drawn at her return.    2. Mixed hyperlipidemia  Encouraged to continue to work on her diet and exercise plan.  Continue current medication    3. Essential hypertension  Remains at goal with lifestyle modification  We will continue to monitor blood pressure and urine microalbumin levels    4. Chronic allergic rhinitis    5. Gastroesophageal reflux disease without esophagitis   Symptoms are currently well controlled.  Continue current medication.    6. Other constipation    7. Elevated liver enzymes  Will continue to monitor    8. Urge urinary incontinence    9. Microscopic hematuria    10. Left ovarian cyst  Follow up with gynecology    11. LLQ pain  As above.    12. Healthcare maintenance  Hepatitis B #3 administered  -     Hepatitis B Vaccine Adult IM    13. Panic attacks    14. Mechanical low back pain  Reminded regarding symptomatic treatment.   Continue current medication  Follow up with orthopedic surgery    15. KATALINA (obstructive sleep apnea)    16. Hot flashes    17.  Left foot pain  Reminded regarding symptomatic treatment.   Will try to finalize an appointment with podiatry       Follow Up   Return in about 4 months (around 7/29/2024).  Patient was given instructions and counseling regarding her condition or for health maintenance advice. Please see  specific information pulled into the AVS if appropriate.

## 2024-04-06 VITALS
HEART RATE: 92 BPM | WEIGHT: 185 LBS | BODY MASS INDEX: 29.73 KG/M2 | SYSTOLIC BLOOD PRESSURE: 125 MMHG | TEMPERATURE: 98.6 F | RESPIRATION RATE: 14 BRPM | DIASTOLIC BLOOD PRESSURE: 63 MMHG | OXYGEN SATURATION: 94 % | HEIGHT: 66 IN

## 2024-06-22 DIAGNOSIS — F41.0 PANIC ATTACKS: ICD-10-CM

## 2024-06-22 RX ORDER — TRIFLUOPERAZINE HYDROCHLORIDE 1 MG/1
1 TABLET, FILM COATED ORAL 2 TIMES DAILY PRN
Qty: 60 TABLET | Refills: 0 | Status: SHIPPED | OUTPATIENT
Start: 2024-06-22

## 2024-06-22 RX ORDER — FLUOXETINE HYDROCHLORIDE 20 MG/1
40 CAPSULE ORAL EVERY MORNING
Qty: 180 CAPSULE | Refills: 3 | Status: SHIPPED | OUTPATIENT
Start: 2024-06-22

## 2024-06-22 RX ORDER — FLUOXETINE HYDROCHLORIDE 20 MG/1
40 CAPSULE ORAL EVERY MORNING
Qty: 180 CAPSULE | Refills: 3 | Status: SHIPPED | OUTPATIENT
Start: 2024-06-22 | End: 2024-06-22 | Stop reason: SDUPTHER

## 2024-07-18 DIAGNOSIS — F41.0 PANIC ATTACKS: ICD-10-CM

## 2024-07-19 RX ORDER — TRIFLUOPERAZINE HYDROCHLORIDE 1 MG/1
1 TABLET, FILM COATED ORAL 2 TIMES DAILY PRN
Qty: 60 TABLET | Refills: 0 | Status: SHIPPED | OUTPATIENT
Start: 2024-07-19

## 2024-07-26 ENCOUNTER — LAB (OUTPATIENT)
Dept: FAMILY MEDICINE CLINIC | Facility: CLINIC | Age: 49
End: 2024-07-26
Payer: MEDICARE

## 2024-07-26 DIAGNOSIS — K59.09 OTHER CONSTIPATION: ICD-10-CM

## 2024-07-26 DIAGNOSIS — G47.33 OSA (OBSTRUCTIVE SLEEP APNEA): ICD-10-CM

## 2024-07-26 DIAGNOSIS — I10 ESSENTIAL HYPERTENSION: ICD-10-CM

## 2024-07-26 DIAGNOSIS — R10.2 PELVIC PAIN: ICD-10-CM

## 2024-07-26 DIAGNOSIS — E11.9 TYPE 2 DIABETES MELLITUS WITHOUT COMPLICATION, WITHOUT LONG-TERM CURRENT USE OF INSULIN: ICD-10-CM

## 2024-07-26 DIAGNOSIS — E78.2 MIXED HYPERLIPIDEMIA: ICD-10-CM

## 2024-07-26 DIAGNOSIS — K21.9 GASTROESOPHAGEAL REFLUX DISEASE WITHOUT ESOPHAGITIS: ICD-10-CM

## 2024-07-26 PROCEDURE — 36415 COLL VENOUS BLD VENIPUNCTURE: CPT | Performed by: GENERAL PRACTICE

## 2024-07-27 LAB
ALBUMIN SERPL-MCNC: 4.3 G/DL (ref 3.5–5.2)
ALBUMIN/GLOB SERPL: 1.7 G/DL
ALP SERPL-CCNC: 74 U/L (ref 39–117)
ALT SERPL-CCNC: 20 U/L (ref 1–33)
AST SERPL-CCNC: 23 U/L (ref 1–32)
BASOPHILS # BLD AUTO: 0.05 10*3/MM3 (ref 0–0.2)
BASOPHILS NFR BLD AUTO: 0.6 % (ref 0–1.5)
BILIRUB SERPL-MCNC: 0.3 MG/DL (ref 0–1.2)
BUN SERPL-MCNC: 12 MG/DL (ref 6–20)
BUN/CREAT SERPL: 13.2 (ref 7–25)
CALCIUM SERPL-MCNC: 8.9 MG/DL (ref 8.6–10.5)
CHLORIDE SERPL-SCNC: 103 MMOL/L (ref 98–107)
CHOLEST SERPL-MCNC: 142 MG/DL (ref 0–200)
CO2 SERPL-SCNC: 23.2 MMOL/L (ref 22–29)
CREAT SERPL-MCNC: 0.91 MG/DL (ref 0.57–1)
EGFRCR SERPLBLD CKD-EPI 2021: 78 ML/MIN/1.73
EOSINOPHIL # BLD AUTO: 0.15 10*3/MM3 (ref 0–0.4)
EOSINOPHIL NFR BLD AUTO: 1.8 % (ref 0.3–6.2)
ERYTHROCYTE [DISTWIDTH] IN BLOOD BY AUTOMATED COUNT: 12.8 % (ref 12.3–15.4)
GLOBULIN SER CALC-MCNC: 2.6 GM/DL
GLUCOSE SERPL-MCNC: 196 MG/DL (ref 65–99)
HBA1C MFR BLD: 8 % (ref 4.8–5.6)
HCT VFR BLD AUTO: 43.9 % (ref 34–46.6)
HDLC SERPL-MCNC: 41 MG/DL (ref 40–60)
HGB BLD-MCNC: 14.7 G/DL (ref 12–15.9)
IMM GRANULOCYTES # BLD AUTO: 0.03 10*3/MM3 (ref 0–0.05)
IMM GRANULOCYTES NFR BLD AUTO: 0.4 % (ref 0–0.5)
LDLC SERPL CALC-MCNC: 73 MG/DL (ref 0–100)
LYMPHOCYTES # BLD AUTO: 2.18 10*3/MM3 (ref 0.7–3.1)
LYMPHOCYTES NFR BLD AUTO: 26.5 % (ref 19.6–45.3)
MCH RBC QN AUTO: 32.2 PG (ref 26.6–33)
MCHC RBC AUTO-ENTMCNC: 33.5 G/DL (ref 31.5–35.7)
MCV RBC AUTO: 96.3 FL (ref 79–97)
MICROALBUMIN UR-MCNC: 149.6 UG/ML
MONOCYTES # BLD AUTO: 0.74 10*3/MM3 (ref 0.1–0.9)
MONOCYTES NFR BLD AUTO: 9 % (ref 5–12)
NEUTROPHILS # BLD AUTO: 5.09 10*3/MM3 (ref 1.7–7)
NEUTROPHILS NFR BLD AUTO: 61.7 % (ref 42.7–76)
NRBC BLD AUTO-RTO: 0 /100 WBC (ref 0–0.2)
PLATELET # BLD AUTO: 270 10*3/MM3 (ref 140–450)
POTASSIUM SERPL-SCNC: 4.9 MMOL/L (ref 3.5–5.2)
PROT SERPL-MCNC: 6.9 G/DL (ref 6–8.5)
RBC # BLD AUTO: 4.56 10*6/MM3 (ref 3.77–5.28)
SODIUM SERPL-SCNC: 137 MMOL/L (ref 136–145)
TRIGL SERPL-MCNC: 163 MG/DL (ref 0–150)
VLDLC SERPL CALC-MCNC: 28 MG/DL (ref 5–40)
WBC # BLD AUTO: 8.24 10*3/MM3 (ref 3.4–10.8)

## 2024-08-25 DIAGNOSIS — F41.0 PANIC ATTACKS: ICD-10-CM

## 2024-08-26 RX ORDER — TRIFLUOPERAZINE HYDROCHLORIDE 1 MG/1
1 TABLET, FILM COATED ORAL 2 TIMES DAILY PRN
Qty: 60 TABLET | Refills: 0 | Status: SHIPPED | OUTPATIENT
Start: 2024-08-26

## 2024-11-06 DIAGNOSIS — J30.9 CHRONIC ALLERGIC RHINITIS: ICD-10-CM

## 2024-11-06 RX ORDER — MONTELUKAST SODIUM 10 MG/1
10 TABLET ORAL DAILY
Qty: 90 TABLET | Refills: 3 | Status: SHIPPED | OUTPATIENT
Start: 2024-11-06

## 2024-11-06 RX ORDER — DICYCLOMINE HCL 20 MG
20 TABLET ORAL 2 TIMES DAILY
Qty: 180 TABLET | Refills: 3 | Status: SHIPPED | OUTPATIENT
Start: 2024-11-06

## 2024-11-26 ENCOUNTER — OFFICE VISIT (OUTPATIENT)
Dept: FAMILY MEDICINE CLINIC | Facility: CLINIC | Age: 49
End: 2024-11-26
Payer: MEDICARE

## 2024-11-26 DIAGNOSIS — E11.9 TYPE 2 DIABETES MELLITUS WITHOUT COMPLICATION, WITHOUT LONG-TERM CURRENT USE OF INSULIN: ICD-10-CM

## 2024-11-26 DIAGNOSIS — N83.202 LEFT OVARIAN CYST: ICD-10-CM

## 2024-11-26 DIAGNOSIS — I47.29 NONSUSTAINED VENTRICULAR TACHYCARDIA: ICD-10-CM

## 2024-11-26 DIAGNOSIS — J30.9 CHRONIC ALLERGIC RHINITIS: Primary | ICD-10-CM

## 2024-11-26 DIAGNOSIS — M54.59 MECHANICAL LOW BACK PAIN: ICD-10-CM

## 2024-11-26 DIAGNOSIS — N39.41 URGE URINARY INCONTINENCE: ICD-10-CM

## 2024-11-26 DIAGNOSIS — Z00.00 HEALTHCARE MAINTENANCE: ICD-10-CM

## 2024-11-26 DIAGNOSIS — R74.8 ELEVATED LIVER ENZYMES: ICD-10-CM

## 2024-11-26 DIAGNOSIS — E78.2 MIXED HYPERLIPIDEMIA: ICD-10-CM

## 2024-11-26 DIAGNOSIS — G47.33 OSA (OBSTRUCTIVE SLEEP APNEA): ICD-10-CM

## 2024-11-26 DIAGNOSIS — I10 ESSENTIAL HYPERTENSION: ICD-10-CM

## 2024-11-26 DIAGNOSIS — K21.9 GASTROESOPHAGEAL REFLUX DISEASE WITHOUT ESOPHAGITIS: ICD-10-CM

## 2024-11-26 DIAGNOSIS — F41.0 PANIC ATTACKS: ICD-10-CM

## 2024-11-26 DIAGNOSIS — K59.09 OTHER CONSTIPATION: ICD-10-CM

## 2024-11-26 LAB
ALBUMIN SERPL-MCNC: 4.5 G/DL (ref 3.5–5.2)
ALBUMIN/GLOB SERPL: 1.4 G/DL
ALP SERPL-CCNC: 79 U/L (ref 39–117)
ALT SERPL W P-5'-P-CCNC: 24 U/L (ref 1–33)
ANION GAP SERPL CALCULATED.3IONS-SCNC: 12.1 MMOL/L (ref 5–15)
AST SERPL-CCNC: 22 U/L (ref 1–32)
BASOPHILS # BLD AUTO: 0.05 10*3/MM3 (ref 0–0.2)
BASOPHILS NFR BLD AUTO: 0.5 % (ref 0–1.5)
BILIRUB SERPL-MCNC: 0.5 MG/DL (ref 0–1.2)
BUN SERPL-MCNC: 14 MG/DL (ref 6–20)
BUN/CREAT SERPL: 16.5 (ref 7–25)
CALCIUM SPEC-SCNC: 9.5 MG/DL (ref 8.6–10.5)
CHLORIDE SERPL-SCNC: 102 MMOL/L (ref 98–107)
CHOLEST SERPL-MCNC: 158 MG/DL (ref 0–200)
CO2 SERPL-SCNC: 24.9 MMOL/L (ref 22–29)
CREAT SERPL-MCNC: 0.85 MG/DL (ref 0.57–1)
DEPRECATED RDW RBC AUTO: 43 FL (ref 37–54)
EGFRCR SERPLBLD CKD-EPI 2021: 84.6 ML/MIN/1.73
EOSINOPHIL # BLD AUTO: 0.14 10*3/MM3 (ref 0–0.4)
EOSINOPHIL NFR BLD AUTO: 1.5 % (ref 0.3–6.2)
ERYTHROCYTE [DISTWIDTH] IN BLOOD BY AUTOMATED COUNT: 12.4 % (ref 12.3–15.4)
GLOBULIN UR ELPH-MCNC: 3.3 GM/DL
GLUCOSE SERPL-MCNC: 158 MG/DL (ref 65–99)
HBA1C MFR BLD: 8 % (ref 4.8–5.6)
HCT VFR BLD AUTO: 44.5 % (ref 34–46.6)
HDLC SERPL-MCNC: 44 MG/DL (ref 40–60)
HGB BLD-MCNC: 14.8 G/DL (ref 12–15.9)
IMM GRANULOCYTES # BLD AUTO: 0.03 10*3/MM3 (ref 0–0.05)
IMM GRANULOCYTES NFR BLD AUTO: 0.3 % (ref 0–0.5)
LDLC SERPL CALC-MCNC: 90 MG/DL (ref 0–100)
LDLC/HDLC SERPL: 1.97 {RATIO}
LYMPHOCYTES # BLD AUTO: 2.5 10*3/MM3 (ref 0.7–3.1)
LYMPHOCYTES NFR BLD AUTO: 26.7 % (ref 19.6–45.3)
MCH RBC QN AUTO: 31.7 PG (ref 26.6–33)
MCHC RBC AUTO-ENTMCNC: 33.3 G/DL (ref 31.5–35.7)
MCV RBC AUTO: 95.3 FL (ref 79–97)
MONOCYTES # BLD AUTO: 0.76 10*3/MM3 (ref 0.1–0.9)
MONOCYTES NFR BLD AUTO: 8.1 % (ref 5–12)
NEUTROPHILS NFR BLD AUTO: 5.89 10*3/MM3 (ref 1.7–7)
NEUTROPHILS NFR BLD AUTO: 62.9 % (ref 42.7–76)
NRBC BLD AUTO-RTO: 0 /100 WBC (ref 0–0.2)
PLATELET # BLD AUTO: 286 10*3/MM3 (ref 140–450)
PMV BLD AUTO: 10.6 FL (ref 6–12)
POTASSIUM SERPL-SCNC: 4.7 MMOL/L (ref 3.5–5.2)
PROT SERPL-MCNC: 7.8 G/DL (ref 6–8.5)
RBC # BLD AUTO: 4.67 10*6/MM3 (ref 3.77–5.28)
SODIUM SERPL-SCNC: 139 MMOL/L (ref 136–145)
TRIGL SERPL-MCNC: 137 MG/DL (ref 0–150)
TSH SERPL DL<=0.05 MIU/L-ACNC: 1.86 UIU/ML (ref 0.27–4.2)
VLDLC SERPL-MCNC: 24 MG/DL (ref 5–40)
WBC NRBC COR # BLD AUTO: 9.37 10*3/MM3 (ref 3.4–10.8)

## 2024-11-26 PROCEDURE — 80053 COMPREHEN METABOLIC PANEL: CPT | Performed by: GENERAL PRACTICE

## 2024-11-26 PROCEDURE — 85025 COMPLETE CBC W/AUTO DIFF WBC: CPT | Performed by: GENERAL PRACTICE

## 2024-11-26 PROCEDURE — 82607 VITAMIN B-12: CPT | Performed by: GENERAL PRACTICE

## 2024-11-26 PROCEDURE — 99214 OFFICE O/P EST MOD 30 MIN: CPT | Performed by: GENERAL PRACTICE

## 2024-11-26 PROCEDURE — 36415 COLL VENOUS BLD VENIPUNCTURE: CPT | Performed by: GENERAL PRACTICE

## 2024-11-26 PROCEDURE — 3074F SYST BP LT 130 MM HG: CPT | Performed by: GENERAL PRACTICE

## 2024-11-26 PROCEDURE — G2211 COMPLEX E/M VISIT ADD ON: HCPCS | Performed by: GENERAL PRACTICE

## 2024-11-26 PROCEDURE — 83036 HEMOGLOBIN GLYCOSYLATED A1C: CPT | Performed by: GENERAL PRACTICE

## 2024-11-26 PROCEDURE — 3052F HG A1C>EQUAL 8.0%<EQUAL 9.0%: CPT | Performed by: GENERAL PRACTICE

## 2024-11-26 PROCEDURE — 3078F DIAST BP <80 MM HG: CPT | Performed by: GENERAL PRACTICE

## 2024-11-26 PROCEDURE — 80061 LIPID PANEL: CPT | Performed by: GENERAL PRACTICE

## 2024-11-26 PROCEDURE — 84443 ASSAY THYROID STIM HORMONE: CPT | Performed by: GENERAL PRACTICE

## 2024-11-26 NOTE — PROGRESS NOTES
Subjective   Iraida Heard is a 49 y.o. female.     Chief Complaint  She returns for a scheduled reassessment of multiple medical problems including chronic low back pain, chronic pelvic pain, type 2 diabetes mellitus, hyperlipidemia, and essential hypertension    History of Present Illness     Chronic Low Back Pain  Non-contrast CT of the pelvis performed on 7/31/2023 revealed moderate degenerative changes of both SI joints with sclerosis, osteophytes, and vacuum phenomena as well as marked degenerative disc disease at L4-5 and mild osteoarthritis of both hips.  She underwent a right SI joint fusion by Dr. Byrne on 8/21/2023.  She has noted a continued improvement in her low back pain.  There has been no change in the quality nor any new associated symptoms.  She continues to deny any changes in her strength, sensation, or bowel/bladder control.      Chronic Pelvic Pain  She continues to experience intermittent left pelvic pain. She continues to experience intermittent urinary frequency and urgency, but there is no history of any dysuria, hematuria, suprapubic pressure, or vaginal bleeding. She has had intermittent diarrhea since starting on metformin.  She continues to deny any nausea, vomiting, hematochezia, or melena. Contrast CT of the abdomen/pelvis performed on 2/23/23 was reported as showing mild enlargement of the left ovary with a left adnexal cyst.  She underwent a second gynecology opinion with Dr. Abebe on 4/23/2024 that included a transvaginal ultrasound with with no significant abnormalities noted    Type 2 Diabetes Mellitus  She has been following her diet and exercise plan more carefully.  She was unable to tolerate metformin   Lab Results   Component Value Date    HGBA1C 8.00 (H) 11/26/2024     Lab Results   Component Value Date    MICROALBUR 149.6 07/26/2024      Hyperlipidemia  She remains on rosuvastatin with no apparent side effects.  Lab Results   Component Value Date    CHOL 158  11/26/2024    CHLPL 142 07/26/2024    TRIG 137 11/26/2024    HDL 44 11/26/2024    LDL 90 11/26/2024     Essential Hypertension  She has been off medication for some time.  She continues to deny any chest pain, palpitations, shortness of breath, lightheadedness, or lower extremity edema  Lab Results   Component Value Date    GLUCOSE 158 (H) 11/26/2024    BUN 14 11/26/2024    CREATININE 0.85 11/26/2024     11/26/2024    K 4.7 11/26/2024     11/26/2024    CALCIUM 9.5 11/26/2024    PROTEINTOT 7.8 11/26/2024    ALBUMIN 4.5 11/26/2024    ALT 24 11/26/2024    AST 22 11/26/2024    ALKPHOS 79 11/26/2024    BILITOT 0.5 11/26/2024    GLOB 3.3 11/26/2024    AGRATIO 1.4 11/26/2024    BCR 16.5 11/26/2024    ANIONGAP 12.1 11/26/2024    EGFR 84.6 11/26/2024     The following portions of the patient's history were reviewed and updated as appropriate: allergies, current medications, past medical history, past social history, and problem list.    Review of Systems   Constitutional:  Positive for fatigue. Negative for chills and fever.   HENT:  Positive for congestion and rhinorrhea. Negative for ear pain, sore throat and voice change.    Eyes:  Negative for visual disturbance.   Respiratory:  Negative for cough, shortness of breath and wheezing.    Cardiovascular:  Negative for chest pain, palpitations and leg swelling.   Gastrointestinal:  Negative for abdominal pain, blood in stool, constipation, diarrhea, nausea and vomiting.   Genitourinary:  Positive for frequency, pelvic pain and urgency. Negative for dysuria, hematuria and vaginal bleeding.   Musculoskeletal:  Positive for arthralgias and back pain. Negative for joint swelling and myalgias.   Skin:  Negative for rash.   Neurological:  Negative for dizziness, weakness and numbness.   Psychiatric/Behavioral:  Positive for sleep disturbance. Negative for depressed mood. The patient is nervous/anxious.      Objective   Physical Exam  Constitutional:       General: She  is not in acute distress.     Appearance: Normal appearance. She is well-developed. She is not diaphoretic.      Comments: Accompanied by her mother.  Bright and in good spirits. No apparent distress. No pallor, jaundice, diaphoresis, or cyanosis.   HENT:      Head: Atraumatic.      Right Ear: Tympanic membrane, ear canal and external ear normal.      Left Ear: Tympanic membrane, ear canal and external ear normal.      Mouth/Throat:      Lips: No lesions.      Mouth: Mucous membranes are moist. No oral lesions.      Pharynx: No oropharyngeal exudate or posterior oropharyngeal erythema.   Eyes:      General: Lids are normal.      Extraocular Movements: Extraocular movements intact.      Conjunctiva/sclera: Conjunctivae normal.      Pupils: Pupils are equal.   Neck:      Thyroid: No thyroid mass or thyromegaly.      Vascular: No carotid bruit or JVD.      Trachea: Trachea normal. No tracheal deviation.   Cardiovascular:      Rate and Rhythm: Normal rate and regular rhythm.      Heart sounds: Normal heart sounds, S1 normal and S2 normal. No murmur heard.     No gallop.   Pulmonary:      Effort: Pulmonary effort is normal.      Breath sounds: Normal breath sounds.   Abdominal:      General: Bowel sounds are normal. There is no distension.   Musculoskeletal:      Lumbar back: Negative right straight leg raise test and negative left straight leg raise test.      Right lower leg: No edema.      Left lower leg: No edema.   Lymphadenopathy:      Head:      Right side of head: No submental, submandibular, tonsillar, preauricular, posterior auricular or occipital adenopathy.      Left side of head: No submental, submandibular, tonsillar, preauricular, posterior auricular or occipital adenopathy.      Cervical: No cervical adenopathy.      Upper Body:      Right upper body: No supraclavicular adenopathy.      Left upper body: No supraclavicular adenopathy.   Skin:     General: Skin is warm.      Coloration: Skin is not  cyanotic, jaundiced or pale.      Findings: No rash.      Nails: There is no clubbing.   Neurological:      Mental Status: She is alert and oriented to person, place, and time.      Cranial Nerves: No cranial nerve deficit, dysarthria or facial asymmetry.      Sensory: No sensory deficit.      Motor: No weakness or tremor.      Coordination: Coordination normal.      Gait: Gait normal.   Psychiatric:         Attention and Perception: Attention normal.         Mood and Affect: Mood normal.         Speech: Speech normal.         Behavior: Behavior normal.         Thought Content: Thought content normal.       Assessment & Plan   Problems Addressed this Visit          Allergies and Adverse Reactions    Chronic allergic rhinitis        Cardiac and Vasculature    Essential hypertension   Hypertension:  BP remains at goal off medication . Evidence of target organ damage: none.  Encouraged to continue to work on diet and exercise plan.   Will continue to monitor blood pressure and urine microalbumin levels    Relevant Orders    CBC & Differential (Completed)    Comprehensive Metabolic Panel (Completed)    TSH (Completed)    Mixed hyperlipidemia  As above.   Continue current medication.    Relevant Medications    rosuvastatin (CRESTOR) 20 MG tablet    Other Relevant Orders    Comprehensive Metabolic Panel (Completed)    Lipid Panel (Completed)    TSH (Completed)    Nonsustained ventricular tachycardia    Relevant Orders    CBC & Differential (Completed)    Comprehensive Metabolic Panel (Completed)    TSH (Completed)       Endocrine and Metabolic    Type 2 diabetes mellitus, without long-term current use of insulin  Diabetes mellitus Type II, under inadequate control.   Encouraged to continue to pursue ADA diet  Encouraged aerobic exercise.  Patient interested in resuming semaglutide if covered by her new insurance in January  Updated labs drawn    Relevant Orders    Comprehensive Metabolic Panel (Completed)    TSH  (Completed)    Hemoglobin A1c (Completed)    Vitamin B12 (Completed)    MicroAlbumin, Urine, Random - Urine, Clean Catch       Gastrointestinal Abdominal     Elevated liver enzymes  Will continue to monitor    Relevant Orders    Comprehensive Metabolic Panel (Completed)    Gastroesophageal reflux disease without esophagitis   Symptoms are currently well controlled.  Continue current medication.    Relevant Medications    omeprazole (priLOSEC) 20 MG capsule    Other Relevant Orders    CBC & Differential (Completed)    Other constipation  Will try to obtain a copy of her most recent colonoscopy and any associated pathology reports    Relevant Orders    CBC & Differential (Completed)       Genitourinary and Reproductive     Left ovarian cyst    Urge urinary incontinence       Health Encounters    Healthcare maintenance  Patient has already received a flu and COVID-19 shot this fall  Will arrange an updated mammogram at her return       Mental Health    Panic attacks    Relevant Orders    TSH (Completed)       Musculoskeletal and Injuries    Mechanical low back pain  Reminded regarding symptomatic treatment.   Encouraged to report if any worse or if any new symptoms or concerns.       Sleep    KATALINA (obstructive sleep apnea)    Relevant Orders    CBC & Differential (Completed)     Diagnoses         Codes Comments    Chronic allergic rhinitis    -  Primary ICD-10-CM: J30.9  ICD-9-CM: 477.9     Nonsustained ventricular tachycardia     ICD-10-CM: I47.29  ICD-9-CM: 427.1     Mixed hyperlipidemia     ICD-10-CM: E78.2  ICD-9-CM: 272.2     Essential hypertension     ICD-10-CM: I10  ICD-9-CM: 401.9     Type 2 diabetes mellitus without complication, without long-term current use of insulin     ICD-10-CM: E11.9  ICD-9-CM: 250.00     Other constipation     ICD-10-CM: K59.09  ICD-9-CM: 564.09     Gastroesophageal reflux disease without esophagitis     ICD-10-CM: K21.9  ICD-9-CM: 530.81     Elevated liver enzymes     ICD-10-CM:  R74.8  ICD-9-CM: 790.5     Urge urinary incontinence     ICD-10-CM: N39.41  ICD-9-CM: 788.31     Left ovarian cyst     ICD-10-CM: N83.202  ICD-9-CM: 620.2     Healthcare maintenance     ICD-10-CM: Z00.00  ICD-9-CM: V70.0     Panic attacks     ICD-10-CM: F41.0  ICD-9-CM: 300.01     Mechanical low back pain     ICD-10-CM: M54.59  ICD-9-CM: 724.2     KATALINA (obstructive sleep apnea)     ICD-10-CM: G47.33  ICD-9-CM: 327.23

## 2024-11-27 ENCOUNTER — TELEPHONE (OUTPATIENT)
Dept: FAMILY MEDICINE CLINIC | Facility: CLINIC | Age: 49
End: 2024-11-27
Payer: MEDICARE

## 2024-11-27 VITALS
WEIGHT: 191 LBS | OXYGEN SATURATION: 96 % | DIASTOLIC BLOOD PRESSURE: 63 MMHG | HEIGHT: 66 IN | SYSTOLIC BLOOD PRESSURE: 125 MMHG | RESPIRATION RATE: 14 BRPM | HEART RATE: 95 BPM | TEMPERATURE: 96.9 F | BODY MASS INDEX: 30.7 KG/M2

## 2024-11-27 LAB — VIT B12 BLD-MCNC: 785 PG/ML (ref 211–946)

## 2024-11-27 RX ORDER — ROSUVASTATIN CALCIUM 20 MG/1
20 TABLET, COATED ORAL DAILY
Qty: 90 TABLET | Refills: 3 | Status: SHIPPED | OUTPATIENT
Start: 2024-11-27

## 2024-11-27 NOTE — TELEPHONE ENCOUNTER
----- Message from Heather CHEN sent at 11/27/2024 10:59 AM EST -----    ----- Message -----  From: Amari Mixon MD  Sent: 11/27/2024  10:57 AM EST  To: Kirit Melendez Rep    Need copy of colonoscopy done by Dr. Cochran about 5 years ago along with any associated pathology

## 2024-12-08 DIAGNOSIS — E78.2 MIXED HYPERLIPIDEMIA: ICD-10-CM

## 2024-12-09 RX ORDER — ROSUVASTATIN CALCIUM 20 MG/1
20 TABLET, COATED ORAL DAILY
Qty: 90 TABLET | Refills: 3 | OUTPATIENT
Start: 2024-12-09

## 2025-01-15 ENCOUNTER — TRANSCRIBE ORDERS (OUTPATIENT)
Dept: ADMINISTRATIVE | Facility: HOSPITAL | Age: 50
End: 2025-01-15
Payer: MEDICARE

## 2025-01-15 DIAGNOSIS — Z12.31 SCREENING MAMMOGRAM FOR BREAST CANCER: Primary | ICD-10-CM

## 2025-02-27 ENCOUNTER — OFFICE VISIT (OUTPATIENT)
Dept: FAMILY MEDICINE CLINIC | Facility: CLINIC | Age: 50
End: 2025-02-27
Payer: MEDICARE

## 2025-02-27 VITALS
HEART RATE: 89 BPM | BODY MASS INDEX: 31.02 KG/M2 | WEIGHT: 193 LBS | RESPIRATION RATE: 14 BRPM | OXYGEN SATURATION: 97 % | TEMPERATURE: 97.9 F | DIASTOLIC BLOOD PRESSURE: 65 MMHG | HEIGHT: 66 IN | SYSTOLIC BLOOD PRESSURE: 126 MMHG

## 2025-02-27 DIAGNOSIS — N39.41 URGE URINARY INCONTINENCE: ICD-10-CM

## 2025-02-27 DIAGNOSIS — J30.9 CHRONIC ALLERGIC RHINITIS: Primary | ICD-10-CM

## 2025-02-27 DIAGNOSIS — F41.0 PANIC ATTACKS: ICD-10-CM

## 2025-02-27 DIAGNOSIS — E11.9 TYPE 2 DIABETES MELLITUS WITHOUT COMPLICATION, WITHOUT LONG-TERM CURRENT USE OF INSULIN: ICD-10-CM

## 2025-02-27 DIAGNOSIS — M54.59 MECHANICAL LOW BACK PAIN: ICD-10-CM

## 2025-02-27 DIAGNOSIS — E78.2 MIXED HYPERLIPIDEMIA: ICD-10-CM

## 2025-02-27 DIAGNOSIS — N83.202 LEFT OVARIAN CYST: ICD-10-CM

## 2025-02-27 DIAGNOSIS — K59.09 OTHER CONSTIPATION: ICD-10-CM

## 2025-02-27 DIAGNOSIS — Z00.00 HEALTHCARE MAINTENANCE: ICD-10-CM

## 2025-02-27 DIAGNOSIS — R10.32 LLQ PAIN: ICD-10-CM

## 2025-02-27 DIAGNOSIS — R74.8 ELEVATED LIVER ENZYMES: ICD-10-CM

## 2025-02-27 DIAGNOSIS — I10 ESSENTIAL HYPERTENSION: ICD-10-CM

## 2025-02-27 DIAGNOSIS — I47.29 NONSUSTAINED VENTRICULAR TACHYCARDIA: ICD-10-CM

## 2025-02-27 DIAGNOSIS — K21.9 GASTROESOPHAGEAL REFLUX DISEASE WITHOUT ESOPHAGITIS: ICD-10-CM

## 2025-02-27 DIAGNOSIS — G47.33 OSA (OBSTRUCTIVE SLEEP APNEA): ICD-10-CM

## 2025-02-27 PROBLEM — M79.672 LEFT FOOT PAIN: Status: RESOLVED | Noted: 2024-02-08 | Resolved: 2025-02-27

## 2025-02-27 LAB
EXPIRATION DATE: ABNORMAL
HBA1C MFR BLD: 8.2 % (ref 4.5–5.7)
Lab: ABNORMAL

## 2025-02-27 RX ORDER — TRIFLUOPERAZINE HYDROCHLORIDE 1 MG/1
1 TABLET, FILM COATED ORAL 2 TIMES DAILY PRN
Qty: 60 TABLET | Refills: 0 | Status: SHIPPED | OUTPATIENT
Start: 2025-02-27

## 2025-02-27 NOTE — PROGRESS NOTES
Subjective   Iraida Heard is a 49 y.o. female.     Chief Complaint  She returns for a scheduled reassessment of multiple medical problems including chronic low back pain, chronic left pelvic pain, type 2 diabetes mellitus, hyperlipidemia, and essential hypertension    History of Present Illness     Chronic Low Back Pain  Non-contrast CT of the pelvis performed on 7/31/2023 revealed moderate degenerative changes of both SI joints with sclerosis, osteophytes, and vacuum phenomena as well as marked degenerative disc disease at L4-5 and mild osteoarthritis of both hips.  She underwent a right SI joint fusion by Dr. Byrne on 8/21/2023.  She has noted a continued improvement in her low back pain.  There has been no change in the quality nor any new associated symptoms.  She continues to deny any changes in her strength, sensation, or bowel/bladder control.      Chronic Pelvic Pain  She continues to experience intermittent left pelvic pain. She continues to experience intermittent urinary frequency and urgency, but there is no history of any dysuria, hematuria, suprapubic pressure, or vaginal bleeding. She denies any further diarrhea since discontinuing metformin, and there is no history of any nausea, vomiting, hematochezia, or melena. Contrast CT of the abdomen/pelvis performed on 2/23/23 was reported as showing mild enlargement of the left ovary with a left adnexal cyst.  She underwent a second gynecology opinion with Dr. Abebe on 4/23/2024 that included a transvaginal ultrasound with with no significant abnormalities noted.  Her last colonoscopy was on 8/13/2020 and she was recommended a 5-year follow-up    Type 2 Diabetes Mellitus  She has been following her diet and exercise plan fairly carefully.  She was unable to tolerate metformin.  She did well with semaglutide but had to discontinue this due to cost considerations  Lab Results   Component Value Date    HGBA1C 8.2 (A) 02/27/2025      Hyperlipidemia  She  remains on rosuvastatin with no apparent side effects.    Essential Hypertension  She has been off medication for some time.  She continues to deny any chest pain, palpitations, shortness of breath, lightheadedness, or lower extremity edema    The following portions of the patient's history were reviewed and updated as appropriate: allergies, current medications, past medical history, past social history, and problem list.    Review of Systems   Constitutional:  Positive for fatigue. Negative for chills and fever.   HENT:  Positive for congestion and rhinorrhea. Negative for ear pain, sore throat and voice change.    Eyes:  Negative for visual disturbance.   Respiratory:  Negative for cough, shortness of breath and wheezing.    Cardiovascular:  Negative for chest pain, palpitations and leg swelling.   Gastrointestinal:  Negative for abdominal pain, blood in stool, constipation, diarrhea, nausea and vomiting.   Genitourinary:  Positive for frequency, pelvic pain and urgency. Negative for dysuria, hematuria and vaginal bleeding.   Musculoskeletal:  Positive for arthralgias and back pain. Negative for joint swelling and myalgias.   Skin:  Negative for rash.   Neurological:  Negative for dizziness, weakness and numbness.   Psychiatric/Behavioral:  Positive for sleep disturbance and stress (caring for parents). Negative for depressed mood. The patient is nervous/anxious.      Objective   Physical Exam  Constitutional:       General: She is not in acute distress.     Appearance: Normal appearance. She is well-developed. She is not diaphoretic.      Comments: Accompanied by her mother.  Bright and in good spirits. No apparent distress. No pallor, jaundice, diaphoresis, or cyanosis.   HENT:      Head: Atraumatic.      Right Ear: Tympanic membrane, ear canal and external ear normal.      Left Ear: Tympanic membrane, ear canal and external ear normal.      Mouth/Throat:      Lips: No lesions.      Mouth: Mucous membranes are  moist. No oral lesions.      Pharynx: No oropharyngeal exudate or posterior oropharyngeal erythema.   Eyes:      General: Lids are normal.      Extraocular Movements: Extraocular movements intact.      Conjunctiva/sclera: Conjunctivae normal.      Pupils: Pupils are equal.   Neck:      Thyroid: No thyroid mass or thyromegaly.      Vascular: No carotid bruit or JVD.      Trachea: Trachea normal. No tracheal deviation.   Cardiovascular:      Rate and Rhythm: Normal rate and regular rhythm.      Heart sounds: Normal heart sounds, S1 normal and S2 normal. No murmur heard.     No gallop.   Pulmonary:      Effort: Pulmonary effort is normal.      Breath sounds: Normal breath sounds.   Abdominal:      General: Bowel sounds are normal. There is no distension.   Musculoskeletal:      Lumbar back: Negative right straight leg raise test and negative left straight leg raise test.      Right lower leg: No edema.      Left lower leg: No edema.   Lymphadenopathy:      Head:      Right side of head: No submental, submandibular, tonsillar, preauricular, posterior auricular or occipital adenopathy.      Left side of head: No submental, submandibular, tonsillar, preauricular, posterior auricular or occipital adenopathy.      Cervical: No cervical adenopathy.      Upper Body:      Right upper body: No supraclavicular adenopathy.      Left upper body: No supraclavicular adenopathy.   Skin:     General: Skin is warm.      Coloration: Skin is not cyanotic, jaundiced or pale.      Findings: No rash.      Nails: There is no clubbing.   Neurological:      Mental Status: She is alert and oriented to person, place, and time.      Cranial Nerves: No cranial nerve deficit, dysarthria or facial asymmetry.      Sensory: No sensory deficit.      Motor: No weakness or tremor.      Coordination: Coordination normal.      Gait: Gait normal.   Psychiatric:         Attention and Perception: Attention normal.         Mood and Affect: Mood normal.          Speech: Speech normal.         Behavior: Behavior normal.         Thought Content: Thought content normal.       Assessment & Plan   Problems Addressed this Visit          Allergies and Adverse Reactions    Chronic allergic rhinitis       Cardiac and Vasculature    Essential hypertension   Hypertension:  BP remains at goal off medication . Evidence of target organ damage: none.  Encouraged to continue to work on diet and exercise plan.     Mixed hyperlipidemia  As above.   Continue current medication.    Nonsustained ventricular tachycardia       Endocrine and Metabolic    Type 2 diabetes mellitus, without long-term current use of insulin  Diabetes mellitus Type II, under inadequate control.   Encouraged to continue to pursue ADA diet  Encouraged aerobic exercise.  Will try again to obtain semaglutide  Updated labs will be drawn at her return.    Relevant Orders    POC Glycosylated Hemoglobin (Hb A1C) (Completed)       Gastrointestinal Abdominal     Elevated liver enzymes  Will continue to monitor    Gastroesophageal reflux disease without esophagitis    LLQ pain  Will arrange a routine GYN reassessment    Relevant Orders    Ambulatory Referral to Gynecology    Other constipation  Reminded that she will be due for an updated colonoscopy later this summer  We will discuss again at her return       Genitourinary and Reproductive     Left ovarian cyst  As above.    Relevant Orders    Ambulatory Referral to Gynecology    Urge urinary incontinence  As above.    Relevant Orders    Ambulatory Referral to Gynecology       Health Encounters    Healthcare maintenance  Patient is up-to-date on recommended immunizations    Relevant Orders    Ambulatory Referral to Gynecology       Mental Health    Panic attacks  Significant situational component.   Supportive therapy.   Continue current medication.  Encouraged to report if any worse or if any new symptoms or concerns.    Relevant Medications    trifluoperazine (STELAZINE) 1 MG  tablet       Musculoskeletal and Injuries    Mechanical low back pain  Reminded regarding symptomatic treatment.   Encouraged to report if any worse or if any new symptoms or concerns.       Sleep    KATALINA (obstructive sleep apnea)     Diagnoses         Codes Comments    Chronic allergic rhinitis    -  Primary ICD-10-CM: J30.9  ICD-9-CM: 477.9     Nonsustained ventricular tachycardia     ICD-10-CM: I47.29  ICD-9-CM: 427.1     Mixed hyperlipidemia     ICD-10-CM: E78.2  ICD-9-CM: 272.2     Essential hypertension     ICD-10-CM: I10  ICD-9-CM: 401.9     Type 2 diabetes mellitus without complication, without long-term current use of insulin     ICD-10-CM: E11.9  ICD-9-CM: 250.00     Other constipation     ICD-10-CM: K59.09  ICD-9-CM: 564.09     Gastroesophageal reflux disease without esophagitis     ICD-10-CM: K21.9  ICD-9-CM: 530.81     Elevated liver enzymes     ICD-10-CM: R74.8  ICD-9-CM: 790.5     Urge urinary incontinence     ICD-10-CM: N39.41  ICD-9-CM: 788.31     Healthcare maintenance     ICD-10-CM: Z00.00  ICD-9-CM: V70.0     Left ovarian cyst     ICD-10-CM: N83.202  ICD-9-CM: 620.2     Panic attacks     ICD-10-CM: F41.0  ICD-9-CM: 300.01     Mechanical low back pain     ICD-10-CM: M54.59  ICD-9-CM: 724.2     KATALINA (obstructive sleep apnea)     ICD-10-CM: G47.33  ICD-9-CM: 327.23     LLQ pain     ICD-10-CM: R10.32  ICD-9-CM: 789.04

## 2025-03-03 ENCOUNTER — TELEPHONE (OUTPATIENT)
Dept: FAMILY MEDICINE CLINIC | Facility: CLINIC | Age: 50
End: 2025-03-03
Payer: MEDICARE

## 2025-03-07 PROBLEM — E11.65 TYPE 2 DIABETES MELLITUS WITH HYPERGLYCEMIA, WITHOUT LONG-TERM CURRENT USE OF INSULIN: Status: ACTIVE | Noted: 2022-03-29

## 2025-03-13 DIAGNOSIS — N30.00 ACUTE CYSTITIS WITHOUT HEMATURIA: Primary | ICD-10-CM

## 2025-03-13 RX ORDER — SULFAMETHOXAZOLE AND TRIMETHOPRIM 800; 160 MG/1; MG/1
1 TABLET ORAL 2 TIMES DAILY
Qty: 10 TABLET | Refills: 0 | Status: SHIPPED | OUTPATIENT
Start: 2025-03-13 | End: 2025-03-18

## 2025-03-31 ENCOUNTER — HOSPITAL ENCOUNTER (OUTPATIENT)
Dept: MAMMOGRAPHY | Facility: HOSPITAL | Age: 50
Discharge: HOME | End: 2025-03-31
Admitting: GENERAL PRACTICE
Payer: MEDICARE

## 2025-03-31 DIAGNOSIS — Z12.31 SCREENING MAMMOGRAM FOR BREAST CANCER: ICD-10-CM

## 2025-03-31 PROCEDURE — 77063 BREAST TOMOSYNTHESIS BI: CPT

## 2025-03-31 PROCEDURE — 77067 SCR MAMMO BI INCL CAD: CPT | Performed by: RADIOLOGY

## 2025-03-31 PROCEDURE — 77067 SCR MAMMO BI INCL CAD: CPT

## 2025-03-31 PROCEDURE — 77063 BREAST TOMOSYNTHESIS BI: CPT | Performed by: RADIOLOGY

## 2025-06-14 RX ORDER — CEFDINIR 300 MG/1
300 CAPSULE ORAL 2 TIMES DAILY
Qty: 10 CAPSULE | Refills: 0 | Status: SHIPPED | OUTPATIENT
Start: 2025-06-14 | End: 2025-06-19

## 2025-06-16 ENCOUNTER — OFFICE VISIT (OUTPATIENT)
Dept: FAMILY MEDICINE CLINIC | Facility: CLINIC | Age: 50
End: 2025-06-16
Payer: MEDICARE

## 2025-06-16 DIAGNOSIS — Z00.00 HEALTHCARE MAINTENANCE: ICD-10-CM

## 2025-06-16 DIAGNOSIS — E11.65 TYPE 2 DIABETES MELLITUS WITH HYPERGLYCEMIA, WITHOUT LONG-TERM CURRENT USE OF INSULIN: ICD-10-CM

## 2025-06-16 DIAGNOSIS — I10 ESSENTIAL HYPERTENSION: ICD-10-CM

## 2025-06-16 DIAGNOSIS — F41.0 PANIC ATTACKS: ICD-10-CM

## 2025-06-16 DIAGNOSIS — K21.9 GASTROESOPHAGEAL REFLUX DISEASE WITHOUT ESOPHAGITIS: ICD-10-CM

## 2025-06-16 DIAGNOSIS — R10.32 LLQ PAIN: ICD-10-CM

## 2025-06-16 DIAGNOSIS — M54.59 MECHANICAL LOW BACK PAIN: ICD-10-CM

## 2025-06-16 DIAGNOSIS — J30.9 CHRONIC ALLERGIC RHINITIS: Primary | ICD-10-CM

## 2025-06-16 DIAGNOSIS — E78.2 MIXED HYPERLIPIDEMIA: ICD-10-CM

## 2025-06-16 DIAGNOSIS — N39.41 URGE URINARY INCONTINENCE: ICD-10-CM

## 2025-06-16 DIAGNOSIS — K59.09 OTHER CONSTIPATION: ICD-10-CM

## 2025-06-16 DIAGNOSIS — G47.33 OSA (OBSTRUCTIVE SLEEP APNEA): ICD-10-CM

## 2025-06-16 DIAGNOSIS — R74.8 ELEVATED LIVER ENZYMES: ICD-10-CM

## 2025-06-16 PROCEDURE — G0439 PPPS, SUBSEQ VISIT: HCPCS | Performed by: GENERAL PRACTICE

## 2025-06-16 PROCEDURE — 3052F HG A1C>EQUAL 8.0%<EQUAL 9.0%: CPT | Performed by: GENERAL PRACTICE

## 2025-06-16 PROCEDURE — 99214 OFFICE O/P EST MOD 30 MIN: CPT | Performed by: GENERAL PRACTICE

## 2025-06-16 PROCEDURE — 96160 PT-FOCUSED HLTH RISK ASSMT: CPT | Performed by: GENERAL PRACTICE

## 2025-06-16 PROCEDURE — 3074F SYST BP LT 130 MM HG: CPT | Performed by: GENERAL PRACTICE

## 2025-06-16 PROCEDURE — 1170F FXNL STATUS ASSESSED: CPT | Performed by: GENERAL PRACTICE

## 2025-06-16 PROCEDURE — G2211 COMPLEX E/M VISIT ADD ON: HCPCS | Performed by: GENERAL PRACTICE

## 2025-06-16 PROCEDURE — 3078F DIAST BP <80 MM HG: CPT | Performed by: GENERAL PRACTICE

## 2025-06-16 NOTE — ASSESSMENT & PLAN NOTE
Reminded regarding symptomatic treatment.   Encouraged to report if any worse or if any new symptoms or concerns.

## 2025-06-16 NOTE — ASSESSMENT & PLAN NOTE
Scheduled for left SO by gynecology  Patient aware that ideally this should be performed following an updated colonoscopy and when her glycemic control is ensured.  She is quite uncomfortable, however, and does not want to postpone her surgery unless absolutely necessary  Orders:    CBC & Differential; Future    Comprehensive Metabolic Panel; Future

## 2025-06-16 NOTE — ASSESSMENT & PLAN NOTE
Hypertension: BP remains at goal off medication. Evidence of target organ damage: none.   As above  Continue current medication  Orders:    CBC & Differential; Future    Comprehensive Metabolic Panel; Future    TSH; Future

## 2025-06-16 NOTE — DISCHARGE INSTRUCTIONS
Please discontinue all blood thinners and anticoagulants (except aspirin) prior to surgery as per your surgeon and cardiologist instructions.  Aspirin may be continued up to the day prior to surgery.    HOLD all diabetic medications the morning of surgery as order by physician.    Please follow instructions on use of prep cloths provided by nurse. Return instruction sheet to pre-op nurse on day of surgery.    Arrival time for surgery on  6/19/25 will be given to you by Dr. Sebastian's  Office.    A RESPONSIBLE PERSON MUST REMAIN IN THE WAITING ROOM DURING YOUR PROCEDURE AND A RESPONSIBLE  MUST BE AVAILABLE UPON YOUR DISCHARGE.    General Instructions:  Do NOT eat or drink after midnight 6/18/25 which includes water, mints, or gum.  You may brush your teeth. Dental appliances that are removable must be taken out day of surgery.  Do NOT smoke, chew tobacco, or drink alcohol within 24 hours prior to surgery.  Bring medications in original bottles, any inhalers and if applicable your C-PAP/BI-PAP machine  Bring any papers given to you in the doctor’s office  Wear clean, comfortable clothes and socks  Do NOT wear contact lenses or make-up or dark nail polish.  Bring a case for your glasses if applicable.  Bring crutches or walker if applicable  Leave all other valuables and jewelry at home  If you were given a blood bank armband, continue to wear it until discharged.    Preventing a Surgical Site Infection:  Shower the night before surgery (unless instructed otherwise) using a fresh bar of anti-bacterial soap (such as Dial) and clean washcloth.  Dry with a clean towel and dress in clean clothing.  For 2 to 3 days before surgery, avoid shaving with a razor near where you will have surgery because the razor can irritate skin and make it easier to develop an infection.  Ask your surgeon if you will be receiving antibiotics prior to surgery.  Make sure you, your family, and all healthcare providers clean their hands  with soap and water or an alcohol-based hand  before caring for you or your wound.  If at all possible, quit smoking as many days before surgery as you can.    Day of Surgery:  Upon arrival, a pre-op nurse and anesthesiologist will review your health history, obtain vital signs, and answer questions you may have.  The only belongings needed at this time will be your home medications and if applicable you C-PAP/BI-PAP machine.  If you are staying overnight, your family can leave the rest of your belongings in the car and bring them to your room later.  A pre-op nurse will start an IV and you may receive medication in preparation for surgery.  Due to patient privacy and limited space, only one member of your family will be able to accompany you in the pre-op area.  While you are in surgery your family should notify the waiting room  if they leave the waiting room area and provide a contact number.  Please be aware that surgery does come with discomfort.  We want to make every effort to control your discomfort so please discuss any uncontrolled symptoms with your nurse.  Your doctor will most likely have prescribed pain medications.  If you are going home after surgery you will receive individualized written care instructions before being discharged.  A responsible adult must drive you to and from the hospital on the day of surgery and stay with you for 24 hours.  If you are staying overnight following surgery, you will be transported to your hospital room following the recovery period.

## 2025-06-16 NOTE — ASSESSMENT & PLAN NOTE
Symptoms are currently well controlled.  Continue current medication.  Orders:    CBC & Differential; Future

## 2025-06-16 NOTE — ASSESSMENT & PLAN NOTE
Encouraged to continue to work on her diet and exercise plan.  Continue current medication  Orders:    Comprehensive Metabolic Panel; Future    Lipid Panel; Future    TSH; Future

## 2025-06-16 NOTE — PROGRESS NOTES
Subjective   The ABCs of the Annual Wellness Visit  Medicare Wellness Visit    Iraida Heard is a 50 y.o. patient who presents for a Medicare Wellness Visit.    The following portions of the patient's history were reviewed and   updated as appropriate: allergies, current medications, past family history, past medical history, past social history, past surgical history, and problem list.    Compared to one year ago, the patient's physical   health is worse.  Compared to one year ago, the patient's mental   health is the same.    Recent Hospitalizations:  She was not admitted to the hospital during the last year.     Current Medical Providers:  Patient Care Team:  Amari Mixon MD as PCP - General (Family Medicine)    Outpatient Medications Prior to Visit   Medication Sig Dispense Refill    betamethasone valerate (VALISONE) 0.1 % cream Apply 1 application  topically to the appropriate area as directed 2 (Two) Times a Day. 45 g 0    Blood Glucose Monitoring Suppl (True Metrix Air Glucose Meter) w/Device kit USE AS DIRECTED 1 kit 0    cefdinir (OMNICEF) 300 MG capsule Take 1 capsule by mouth 2 (Two) Times a Day for 5 days. 10 capsule 0    dicyclomine (BENTYL) 20 MG tablet TAKE 1 TABLET TWICE DAILY 180 tablet 3    montelukast (SINGULAIR) 10 MG tablet TAKE 1 TABLET EVERY DAY 90 tablet 3    omeprazole (priLOSEC) 20 MG capsule Take 1 capsule by mouth Daily. 90 capsule 3    rosuvastatin (CRESTOR) 20 MG tablet Take 1 tablet by mouth Daily. 90 tablet 3    trifluoperazine (STELAZINE) 1 MG tablet Take 1 tablet by mouth 2 (Two) Times a Day As Needed (anxiety). for anxiety 60 tablet 0    True Metrix Blood Glucose Test test strip TEST EVERY DAY  AS  INSTRUCTED 100 each 5    TRUEplus Lancets 33G misc TEST BLOOD SUGAR EVERY  each 3    FLUoxetine (PROzac) 20 MG capsule Take 2 capsules by mouth Every Morning. 180 capsule 3     No facility-administered medications prior to visit.     No opioid medication identified  "on active medication list. I have reviewed chart for other potential  high risk medication/s and harmful drug interactions in the elderly.      Aspirin is not on active medication list.  Aspirin use is not indicated based on review of current medical condition/s. Risk of harm outweighs potential benefits.  .    Patient Active Problem List   Diagnosis    Essential hypertension    Nonsustained ventricular tachycardia    Mixed hyperlipidemia    KATALINA (obstructive sleep apnea)    Panic attacks    Hot flashes    Healthcare maintenance    Mechanical low back pain    Chronic allergic rhinitis    Type 2 diabetes mellitus with hyperglycemia, without long-term current use of insulin    Encounter for immunization    Gastroesophageal reflux disease without esophagitis    Microscopic hematuria    Left ovarian cyst    Other constipation    Elevated liver enzymes    Urge urinary incontinence    LLQ pain     Advance Care Planning Advance Directive is not on file.  ACP discussion was held with the patient during this visit. Patient does not have an advance directive, information provided.      Objective   Vitals:    06/16/25 1609   BP: 120/78   Pulse: 83   Resp: 14   Temp: 98.6 °F (37 °C)   TempSrc: Temporal   SpO2: 97%   Weight: 86.2 kg (190 lb)   Height: 167.6 cm (65.98\")     Estimated body mass index is 30.69 kg/m² as calculated from the following:    Height as of this encounter: 167.6 cm (65.98\").    Weight as of this encounter: 86.2 kg (190 lb).    BMI is >= 30 and <35. (Class 1 Obesity). The following options were offered after discussion;: exercise counseling/recommendations and nutrition counseling/recommendations    Does the patient have evidence of cognitive impairment? No                                                                                          Health  Risk Assessment    Smoking Status:  Social History     Tobacco Use   Smoking Status Never    Passive exposure: Never   Smokeless Tobacco Never     Alcohol " Consumption:  Social History     Substance and Sexual Activity   Alcohol Use Never     Fall Risk Screen  STEADI Fall Risk Assessment was completed, and patient is at LOW risk for falls.Assessment completed on:2025    Depression Screening   Little interest or pleasure in doing things? Not at all   Feeling down, depressed, or hopeless? Not at all   PHQ-2 Total Score 0      Health Habits and Functional and Cognitive Screenin/16/2025     4:08 PM   Functional & Cognitive Status   Do you have difficulty preparing food and eating? No   Do you have difficulty bathing yourself, getting dressed or grooming yourself? No   Do you have difficulty using the toilet? No   Do you have difficulty moving around from place to place? No   Do you have trouble with steps or getting out of a bed or a chair? No   Current Diet Well Balanced Diet   Dental Exam Not up to date   Eye Exam Not up to date   Exercise (times per week) 7 times per week   Current Exercises Include Walking   Do you need help using the phone?  No   Are you deaf or do you have serious difficulty hearing?  No   Do you need help to go to places out of walking distance? No   Do you need help shopping? No   Do you need help preparing meals?  No   Do you need help with housework?  No   Do you need help with laundry? No   Do you need help taking your medications? No   Do you need help managing money? No   Do you ever drive or ride in a car without wearing a seat belt? No   Have you felt unusual stress, anger or loneliness in the last month? No   Who do you live with? Spouse   If you need help, do you have trouble finding someone available to you? No   Have you been bothered in the last four weeks by sexual problems? No   Do you have difficulty concentrating, remembering or making decisions? No           Age-appropriate Screening Schedule:  Refer to the list below for future screening recommendations based on patient's age, sex and/or medical conditions. Orders  for these recommended tests are listed in the plan section. The patient has been provided with a written plan.    Health Maintenance List  Health Maintenance   Topic Date Due    DIABETIC FOOT EXAM  Never done    DIABETIC EYE EXAM  Never done    URINE MICROALBUMIN-CREATININE RATIO (uACR)  Never done    ANNUAL WELLNESS VISIT  04/05/2025    COLORECTAL CANCER SCREENING  08/13/2025    HEMOGLOBIN A1C  08/27/2025    INFLUENZA VACCINE  07/01/2025    LIPID PANEL  11/26/2025    MAMMOGRAM  03/31/2027    TDAP/TD VACCINES (2 - Td or Tdap) 10/27/2027    HEPATITIS C SCREENING  Completed    Hepatitis B  Completed    COVID-19 Vaccine  Completed    Pneumococcal Vaccine 0-49  Completed                                                                                                                                              CMS Preventative Services Quick Reference  Risk Factors Identified During Encounter  Chronic Pain: Natural history and expected course discussed. Questions answered.  Depression/Dysphoria: Current medication is effective, no change recommended    The above risks/problems have been discussed with the patient.  Pertinent information has been shared with the patient in the After Visit Summary.  An After Visit Summary and PPPS were made available to the patient.    Follow Up:   Next Medicare Wellness visit to be scheduled in 1 year.     Additional E&M Note during same encounter follows:  Patient has additional, significant, and separately identifiable condition(s)/problem(s) that require work above and beyond the Medicare Wellness Visit     Chief Complaint  She returns for a scheduled reassessment of multiple medical problems including chronic left pelvic pain, chronic low back pain, type 2 diabetes mellitus, hyperlipidemia, and essential hypertension    Kalpana BIRD  Iraida is also being seen today for additional medical problem/s.    Chronic Pelvic Pain  She complains of increased left pelvic pain. She continues  to experience intermittent urinary frequency and urgency, but there is no history of any dysuria, hematuria, suprapubic pressure, or vaginal bleeding. She continues to deny any nausea, vomiting, change in her bowel habits, hematochezia, or melena.  She has established gynecology care with Dr. Sebastian.  Updated ultrasound revealed a left ovarian cyst with heterogenous ovarian tissue.  She is scheduled to undergo a left salpingo-oophorectomy on 6/19/2025.  Her last colonoscopy was on 8/13/2020 and she was recommended a 5-year follow-up    Chronic Low Back Pain  Non-contrast CT of the pelvis performed on 7/31/2023 revealed moderate degenerative changes of both SI joints with sclerosis, osteophytes, and vacuum phenomena as well as marked degenerative disc disease at L4-5 and mild osteoarthritis of both hips.  She underwent a right SI joint fusion by Dr. Byrne on 8/21/2023.  She has noted a continued improvement in her low back pain.  There has been no change in the quality nor any new associated symptoms.  She continues to deny any changes in her strength, sensation, or bowel/bladder control.      Type 2 Diabetes Mellitus  She has been following her diet and exercise plan fairly carefully.  She was unable to tolerate metformin.  She did well with semaglutide but had to discontinue this due to cost considerations.  She has had no recent labs     Hyperlipidemia  She remains on rosuvastatin with no apparent side effects.    Essential Hypertension  She has been off medication for some time.  She continues to deny any chest pain, palpitations, shortness of breath, lightheadedness, or lower extremity edema    Review of Systems   Constitutional:  Positive for fatigue. Negative for appetite change, chills, diaphoresis and fever.   HENT:  Positive for congestion and rhinorrhea. Negative for ear pain, postnasal drip, sinus pressure, sneezing, sore throat, tinnitus and voice change.    Eyes:  Negative for visual disturbance.  "  Respiratory:  Negative for cough, shortness of breath and wheezing.    Cardiovascular:  Negative for chest pain, palpitations and leg swelling.   Gastrointestinal:  Negative for abdominal pain, blood in stool, constipation, diarrhea, nausea and vomiting.   Endocrine: Negative for polydipsia and polyuria.   Genitourinary:  Positive for frequency, pelvic pain and urgency. Negative for dysuria and hematuria.   Musculoskeletal:  Positive for arthralgias and back pain. Negative for joint swelling and myalgias.   Skin:  Negative for rash.   Neurological:  Negative for weakness, numbness and confusion.   Psychiatric/Behavioral:  Positive for sleep disturbance. Negative for decreased concentration, dysphoric mood and suicidal ideas. The patient is nervous/anxious (remains under considerable amount of stress).         Objective   Vital Signs:  /78   Pulse 83   Temp 98.6 °F (37 °C) (Temporal)   Resp 14   Ht 167.6 cm (65.98\")   Wt 86.2 kg (190 lb)   SpO2 97%   BMI 30.69 kg/m²     Physical Exam  Constitutional:       General: She is not in acute distress.     Appearance: Normal appearance. She is well-developed. She is not diaphoretic.      Comments: Accompanied by her mother.  Bright and in good spirits. No apparent distress. No pallor, jaundice, diaphoresis, or cyanosis.   HENT:      Head: Atraumatic.      Right Ear: Tympanic membrane, ear canal and external ear normal.      Left Ear: Tympanic membrane, ear canal and external ear normal.      Mouth/Throat:      Lips: No lesions.      Mouth: Mucous membranes are moist. No oral lesions.      Pharynx: No oropharyngeal exudate or posterior oropharyngeal erythema.   Eyes:      General: Lids are normal.      Extraocular Movements: Extraocular movements intact.      Conjunctiva/sclera: Conjunctivae normal.      Pupils: Pupils are equal.   Neck:      Thyroid: No thyroid mass or thyromegaly.      Vascular: No carotid bruit or JVD.      Trachea: Trachea normal. No " tracheal deviation.   Cardiovascular:      Rate and Rhythm: Normal rate and regular rhythm.      Heart sounds: Normal heart sounds, S1 normal and S2 normal. No murmur heard.     No gallop.   Pulmonary:      Effort: Pulmonary effort is normal.      Breath sounds: Normal breath sounds.   Abdominal:      General: Bowel sounds are normal. There is no distension or abdominal bruit.      Palpations: Abdomen is soft. There is no hepatomegaly, splenomegaly or mass.      Tenderness: There is abdominal tenderness in the left lower quadrant. There is no right CVA tenderness, left CVA tenderness, guarding or rebound.      Hernia: No hernia is present.   Musculoskeletal:      Lumbar back: Negative right straight leg raise test and negative left straight leg raise test.      Right lower leg: No edema.      Left lower leg: No edema.   Lymphadenopathy:      Head:      Right side of head: No submental, submandibular, tonsillar, preauricular, posterior auricular or occipital adenopathy.      Left side of head: No submental, submandibular, tonsillar, preauricular, posterior auricular or occipital adenopathy.      Cervical: No cervical adenopathy.      Upper Body:      Right upper body: No supraclavicular adenopathy.      Left upper body: No supraclavicular adenopathy.   Skin:     General: Skin is warm.      Coloration: Skin is not cyanotic, jaundiced or pale.      Findings: No rash.      Nails: There is no clubbing.   Neurological:      Mental Status: She is alert and oriented to person, place, and time.      Cranial Nerves: No cranial nerve deficit, dysarthria or facial asymmetry.      Sensory: No sensory deficit.      Motor: No weakness or tremor.      Coordination: Coordination normal.      Gait: Gait normal.   Psychiatric:         Attention and Perception: Attention normal.         Mood and Affect: Mood normal.         Speech: Speech normal.         Behavior: Behavior normal.         Thought Content: Thought content normal.         Assessment and Plan   Additional age appropriate preventative wellness advice topics were discussed during today's preventative wellness exam(some topics already addressed during AWV portion of the note above):    Physical Activity: Advised cardiovascular activity 150 minutes per week as tolerated. (example brisk walk for 30 minutes, 5 days a week).     Nutrition: Discussed nutrition plan with patient. Information shared in after visit summary. Goal is for a well balanced diet to enhance overall health.     Chronic allergic rhinitis  Continue current medication  Encouraged to report if any worse or if any new symptoms or concerns.       Mixed hyperlipidemia   Encouraged to continue to work on her diet and exercise plan.  Continue current medication  Orders:    Comprehensive Metabolic Panel; Future    Lipid Panel; Future    TSH; Future    Essential hypertension   Hypertension: BP remains at goal off medication. Evidence of target organ damage: none.   As above  Continue current medication  Orders:    CBC & Differential; Future    Comprehensive Metabolic Panel; Future    TSH; Future    Type 2 diabetes mellitus with hyperglycemia, without long-term current use of insulin  Diabetes mellitus Type II, under unknown control.   Encouraged to continue to pursue ADA diet  Encouraged aerobic exercise.  Scheduled for updated labs   Orders:    Comprehensive Metabolic Panel; Future    TSH; Future    Hemoglobin A1c; Future    Microalbumin / Creatinine Urine Ratio - Urine, Clean Catch; Future    Other constipation  Orders:    CBC & Differential; Future    LLQ pain  Scheduled for left SO by gynecology  Patient aware that ideally this should be performed following an updated colonoscopy and when her glycemic control is ensured.  She is quite uncomfortable, however, and does not want to postpone her surgery unless absolutely necessary  Orders:    CBC & Differential; Future    Comprehensive Metabolic Panel; Future    Gastroesophageal  reflux disease without esophagitis   Symptoms are currently well controlled.  Continue current medication.  Orders:    CBC & Differential; Future    Elevated liver enzymes  Will continue to monitor  Orders:    Comprehensive Metabolic Panel; Future    Urge urinary incontinence       Healthcare maintenance  As above.  Will administer a flu shot and discuss an updated colonoscopy at her return       Panic attacks    Orders:    FLUoxetine (PROzac) 20 MG capsule; Take 2 capsules by mouth Every Morning.    TSH; Future    Mechanical low back pain  Reminded regarding symptomatic treatment.   Encouraged to report if any worse or if any new symptoms or concerns.       KATALINA (obstructive sleep apnea)  Orders:    CBC & Differential; Future            Follow Up   Return in about 15 weeks (around 9/29/2025).  Patient was given instructions and counseling regarding her condition or for health maintenance advice. Please see specific information pulled into the AVS if appropriate.

## 2025-06-16 NOTE — ASSESSMENT & PLAN NOTE
Orders:    FLUoxetine (PROzac) 20 MG capsule; Take 2 capsules by mouth Every Morning.    TSH; Future

## 2025-06-16 NOTE — ASSESSMENT & PLAN NOTE
Diabetes mellitus Type II, under unknown control.   Encouraged to continue to pursue ADA diet  Encouraged aerobic exercise.  Scheduled for updated labs   Orders:    Comprehensive Metabolic Panel; Future    TSH; Future    Hemoglobin A1c; Future    Microalbumin / Creatinine Urine Ratio - Urine, Clean Catch; Future

## 2025-06-17 ENCOUNTER — PRE-ADMISSION TESTING (OUTPATIENT)
Dept: PREADMISSION TESTING | Facility: HOSPITAL | Age: 50
End: 2025-06-17
Payer: MEDICARE

## 2025-06-17 VITALS
HEIGHT: 66 IN | BODY MASS INDEX: 30.53 KG/M2 | OXYGEN SATURATION: 97 % | SYSTOLIC BLOOD PRESSURE: 120 MMHG | DIASTOLIC BLOOD PRESSURE: 78 MMHG | RESPIRATION RATE: 14 BRPM | TEMPERATURE: 98.6 F | WEIGHT: 190 LBS | HEART RATE: 83 BPM

## 2025-06-17 LAB
ABO GROUP BLD: NORMAL
ANION GAP SERPL CALCULATED.3IONS-SCNC: 11.8 MMOL/L (ref 5–15)
BLD GP AB SCN SERPL QL: NEGATIVE
BUN SERPL-MCNC: 12.4 MG/DL (ref 6–20)
BUN/CREAT SERPL: 15.1 (ref 7–25)
CALCIUM SPEC-SCNC: 8.7 MG/DL (ref 8.6–10.5)
CHLORIDE SERPL-SCNC: 100 MMOL/L (ref 98–107)
CO2 SERPL-SCNC: 25.2 MMOL/L (ref 22–29)
CREAT SERPL-MCNC: 0.82 MG/DL (ref 0.57–1)
DEPRECATED RDW RBC AUTO: 42.8 FL (ref 37–54)
EGFRCR SERPLBLD CKD-EPI 2021: 87.8 ML/MIN/1.73
ERYTHROCYTE [DISTWIDTH] IN BLOOD BY AUTOMATED COUNT: 12.1 % (ref 12.3–15.4)
GLUCOSE SERPL-MCNC: 224 MG/DL (ref 65–99)
HCT VFR BLD AUTO: 44.9 % (ref 34–46.6)
HGB BLD-MCNC: 14.7 G/DL (ref 12–15.9)
MCH RBC QN AUTO: 31.6 PG (ref 26.6–33)
MCHC RBC AUTO-ENTMCNC: 32.7 G/DL (ref 31.5–35.7)
MCV RBC AUTO: 96.6 FL (ref 79–97)
PLATELET # BLD AUTO: 261 10*3/MM3 (ref 140–450)
PMV BLD AUTO: 10.7 FL (ref 6–12)
POTASSIUM SERPL-SCNC: 4.2 MMOL/L (ref 3.5–5.2)
RBC # BLD AUTO: 4.65 10*6/MM3 (ref 3.77–5.28)
RH BLD: POSITIVE
SODIUM SERPL-SCNC: 137 MMOL/L (ref 136–145)
T&S EXPIRATION DATE: NORMAL
WBC NRBC COR # BLD AUTO: 8.97 10*3/MM3 (ref 3.4–10.8)

## 2025-06-17 PROCEDURE — 86901 BLOOD TYPING SEROLOGIC RH(D): CPT

## 2025-06-17 PROCEDURE — 86850 RBC ANTIBODY SCREEN: CPT

## 2025-06-17 PROCEDURE — 36415 COLL VENOUS BLD VENIPUNCTURE: CPT

## 2025-06-17 PROCEDURE — 80048 BASIC METABOLIC PNL TOTAL CA: CPT

## 2025-06-17 PROCEDURE — 85027 COMPLETE CBC AUTOMATED: CPT

## 2025-06-17 PROCEDURE — 86900 BLOOD TYPING SEROLOGIC ABO: CPT

## 2025-06-17 NOTE — H&P (VIEW-ONLY)
Patient Identification:  Name: Iraida Heard  Age: 50 year old  Sex: female  : 1975  MRN: [id number]    ----------------------------------------------------------------------------------------------------------------------  Subjective     History of Presenting Illness:  Patient is a 50 year old female that presents today for pre-op visit.  She is scheduled for laparoscopic left salpingo-oophorectomy on 25. She has had left sided pelvic pain. US showed left ovarian cyst with heterogenous ovarian tissue. She previously had hysterectomy and RSO. She has T2DM and HTN. BP well controlled without medications. Also not on medications for T2DM, her PCP advised will likely start soon. Last A1c 8.2 in February. She has been working on diet and exercise. Denies any previous complications with anesthesia. All questions addressed today.   ----------------------------------------------------------------------------------------------------------------------  Review of Systems:    Constitutional:  Negative for chills, diaphoresis, fatigue and fever.   HENT:  Negative for congestion and trouble swallowing.    Eyes:  Negative for visual disturbance.   Respiratory:  Negative for apnea, cough, chest tightness, shortness of breath, wheezing and stridor.    Cardiovascular:  Negative for chest pain, palpitations and leg swelling.   Gastrointestinal:  Negative for abdominal distention, abdominal pain, anal bleeding, blood in stool,   constipation, diarrhea, heartburn, nausea, vomiting and indigestion.   Genitourinary:  Negative for difficulty urinating and dysuria.   Skin:  Negative for color change and rash.   Neurological:  Negative for dizziness, speech difficulty and headaches.   Psychiatric/Behavioral:  Negative for agitation, behavioral problems and hallucinations.   The patient is not nervous/anxious.     ---------------------------------------------------------------------------------------------------------------------    Last Pap: unknown- history of hysterectomy       Past Medical History:   Diagnosis Date   • Chronic sinusitis    • Generalized anxiety disorder    • Irritable bowel syndrome without diarrhea    • Type 2 diabetes mellitus without complications ([facility])      Past Surgical History:   Procedure Laterality Date   • APPENDECTOMY     • BACK SURGERY      SI joint fusion   • GALLBLADDER SURGERY     • HYSTERECTOMY, PARTIAL     • SINUS SURGERY       Family History   Problem Relation Name Age of Onset   • Lung Cancer Mother     • Heart Problems Father     • Breast cancer Maternal Grandmother       Social History     Tobacco Use   • Smoking status: Never     Passive exposure: Never   • Smokeless tobacco: Never   Vaping Use   • Vaping status: Never Used   Substance Use Topics   • Alcohol use: Never   • Drug use: Never     ---------------------------------------------------------------------------------------------------------------------   Allergies:  Oxycodone  ---------------------------------------------------------------------------------------------------------------------   Current Medications:  Current Outpatient Medications   Medication Sig Dispense Refill   • dicyclomine (BENTYL) 20 mg tablet Take 20 mg by mouth twice a day     • omeprazole (PRILOSEC) 20 mg DR capsule Take 20 mg by mouth daily     • cranberry fruit concentrate 215 mg cap daily.     • fiber (FIBER-TABS) 625 mg tablet daily.     • FLUoxetine (PROZAC) 20 mg tablet Take 2 Tablets by mouth daily. IN THE MORNING     • montelukast (SINGULAIR) 10 mg tablet Take 1 Tablet by mouth every evening     • rosuvastatin (CRESTOR) 20 mg tablet Take 1 Tablet by mouth daily.       No current facility-administered medications for this visit.  "    ---------------------------------------------------------------------------------------------------------------------   Objective     Vital Signs:  /87 (Left Arm, Sitting, Regular Adult)  Pulse 72  Temp 98.2 °F (36.8 °C)  Resp 18  Ht 5' 6\" (1.676 m)  Wt 191 lb (86.6 kg)  SpO2 98%  BMI 30.83 kg/m²  OB Status Hysterectomy  Smoking Status Never  BSA 2.01 m²  Body mass index is 30.83 kg/m².    PHYSICAL EXAM:    Constitutional:       General: She is not in acute distress.     Appearance: Normal appearance. She is well-developed. She is not ill-appearing or diaphoretic.   HENT:      Head: Normocephalic and atraumatic. Not macrocephalic.      Mouth/Throat:      Pharynx: Uvula midline.   Eyes:      General: Lids are normal.      Conjunctiva/sclera: Conjunctivae normal.      Pupils: Pupils are equal, round, and reactive to light.   Neck:      Trachea: Trachea normal. No tracheal deviation.    Cardiovascular:      Rate and Rhythm: Normal rate and regular rhythm.      Chest Wall: PMI is not displaced.      Pulses: Normal pulses.      Heart sounds: Normal heart sounds. No murmur heard.     No friction rub. No gallop.   Pulmonary:      Effort: Pulmonary effort is normal. No accessory muscle usage or respiratory distress.      Breath sounds: Normal breath sounds. No decreased breath sounds, wheezing, rhonchi or rales.   Abdominal:      General: Bowel sounds are normal. There is no distension.      Palpations: Abdomen is soft. There is no hepatomegaly, splenomegaly or mass.      Tenderness: There is no abdominal tenderness. There is no guarding or rebound.   Musculoskeletal:      Cervical back: Full passive range of motion without pain, normal range of motion and neck supple. No edema. Normal range of motion.   Skin:     General: Skin is warm and dry.      Coloration: Skin is not pale.      Findings: No abrasion, erythema or rash.   Neurological:      Mental Status: She is alert and oriented to person, place, " and time.      Motor: No tremor, atrophy, abnormal muscle tone or seizure activity.      Gait: Gait normal.   Psychiatric:         Speech: Speech normal.         Behavior: Behavior normal. Behavior is cooperative.     ---------------------------------------------------------------------------------------------------------------------  Assessment & Plan      1. Visit for pre-operative examination (Primary)    -Risks of procedure discussed; including anesthesia, infection, hemorrhage, transfusion and transfusion related infection, damage to surrounding viscera or vasculature, conversion to laparotomy, menopause and rarely death.  Patient admits to understanding all of the above and desires to proceed with the planned surgical procedure.    -Patient aware of what time to be at the hospital.  -Patient educated to not eat or drink after midnight the day before surgery which includes water, mint or gum.   -Patient educated to NOT smoke, chew tobacco, or drink alcohol within 24 hours prior to surgery.    -Wear clean, comfortable clothes and socks. Do not wear contact lenses or make-up or dark nail polish.    -Patient advised to wear red band (blood bank armband) until surgery.  -Will schedule post-op visit for 2-3 weeks after surgery.       Heather Amaro PA-C   [facility]  6/17/2025

## 2025-06-19 ENCOUNTER — ANESTHESIA EVENT (OUTPATIENT)
Dept: PERIOP | Facility: HOSPITAL | Age: 50
End: 2025-06-19
Payer: MEDICARE

## 2025-06-19 ENCOUNTER — HOSPITAL ENCOUNTER (OUTPATIENT)
Facility: HOSPITAL | Age: 50
Setting detail: HOSPITAL OUTPATIENT SURGERY
Discharge: HOME | End: 2025-06-19
Attending: OBSTETRICS & GYNECOLOGY | Admitting: OBSTETRICS & GYNECOLOGY
Payer: MEDICARE

## 2025-06-19 ENCOUNTER — ANESTHESIA (OUTPATIENT)
Dept: PERIOP | Facility: HOSPITAL | Age: 50
End: 2025-06-19
Payer: MEDICARE

## 2025-06-19 ENCOUNTER — APPOINTMENT (OUTPATIENT)
Dept: GENERAL RADIOLOGY | Facility: HOSPITAL | Age: 50
End: 2025-06-19
Payer: MEDICARE

## 2025-06-19 VITALS
RESPIRATION RATE: 18 BRPM | TEMPERATURE: 97.6 F | DIASTOLIC BLOOD PRESSURE: 88 MMHG | HEIGHT: 66 IN | OXYGEN SATURATION: 97 % | HEART RATE: 78 BPM | WEIGHT: 189 LBS | BODY MASS INDEX: 30.37 KG/M2 | SYSTOLIC BLOOD PRESSURE: 134 MMHG

## 2025-06-19 DIAGNOSIS — R10.2 PELVIC PAIN: ICD-10-CM

## 2025-06-19 LAB — GLUCOSE BLDC GLUCOMTR-MCNC: 172 MG/DL (ref 70–130)

## 2025-06-19 PROCEDURE — 25010000002 PROPOFOL 200 MG/20ML EMULSION: Performed by: NURSE ANESTHETIST, CERTIFIED REGISTERED

## 2025-06-19 PROCEDURE — 25010000002 ONDANSETRON PER 1 MG: Performed by: NURSE ANESTHETIST, CERTIFIED REGISTERED

## 2025-06-19 PROCEDURE — 25010000002 FAMOTIDINE (PF) 20 MG/2ML SOLUTION: Performed by: NURSE ANESTHETIST, CERTIFIED REGISTERED

## 2025-06-19 PROCEDURE — 25010000002 LIDOCAINE PF 2% 2 % SOLUTION: Performed by: NURSE ANESTHETIST, CERTIFIED REGISTERED

## 2025-06-19 PROCEDURE — 25010000002 CEFOXITIN PER 1 G: Performed by: OBSTETRICS & GYNECOLOGY

## 2025-06-19 PROCEDURE — 25010000002 GLYCOPYRROLATE 0.4 MG/2ML SOLUTION: Performed by: NURSE ANESTHETIST, CERTIFIED REGISTERED

## 2025-06-19 PROCEDURE — 25010000002 NEOSTIGMINE 10 MG/10ML SOLUTION: Performed by: NURSE ANESTHETIST, CERTIFIED REGISTERED

## 2025-06-19 PROCEDURE — 25010000002 MIDAZOLAM PER 1 MG: Performed by: NURSE ANESTHETIST, CERTIFIED REGISTERED

## 2025-06-19 PROCEDURE — 25810000003 LACTATED RINGERS PER 1000 ML: Performed by: ANESTHESIOLOGY

## 2025-06-19 PROCEDURE — 25010000002 DEXAMETHASONE PER 1 MG: Performed by: NURSE ANESTHETIST, CERTIFIED REGISTERED

## 2025-06-19 PROCEDURE — 82948 REAGENT STRIP/BLOOD GLUCOSE: CPT

## 2025-06-19 PROCEDURE — 25010000002 BUPIVACAINE (PF) 0.25 % SOLUTION: Performed by: OBSTETRICS & GYNECOLOGY

## 2025-06-19 PROCEDURE — 25010000002 BUPIVACAINE 0.5 % SOLUTION: Performed by: OBSTETRICS & GYNECOLOGY

## 2025-06-19 PROCEDURE — 25010000002 FENTANYL CITRATE (PF) 50 MCG/ML SOLUTION: Performed by: NURSE ANESTHETIST, CERTIFIED REGISTERED

## 2025-06-19 RX ORDER — SODIUM CHLORIDE 0.9 % (FLUSH) 0.9 %
10 SYRINGE (ML) INJECTION AS NEEDED
Status: DISCONTINUED | OUTPATIENT
Start: 2025-06-19 | End: 2025-06-19 | Stop reason: HOSPADM

## 2025-06-19 RX ORDER — IPRATROPIUM BROMIDE AND ALBUTEROL SULFATE 2.5; .5 MG/3ML; MG/3ML
3 SOLUTION RESPIRATORY (INHALATION) ONCE AS NEEDED
Status: DISCONTINUED | OUTPATIENT
Start: 2025-06-19 | End: 2025-06-19 | Stop reason: HOSPADM

## 2025-06-19 RX ORDER — FENTANYL CITRATE 50 UG/ML
50 INJECTION, SOLUTION INTRAMUSCULAR; INTRAVENOUS
Status: DISCONTINUED | OUTPATIENT
Start: 2025-06-19 | End: 2025-06-19 | Stop reason: HOSPADM

## 2025-06-19 RX ORDER — MEPERIDINE HYDROCHLORIDE 25 MG/ML
12.5 INJECTION INTRAMUSCULAR; INTRAVENOUS; SUBCUTANEOUS
Status: DISCONTINUED | OUTPATIENT
Start: 2025-06-19 | End: 2025-06-19 | Stop reason: HOSPADM

## 2025-06-19 RX ORDER — NEOSTIGMINE METHYLSULFATE 1 MG/ML
INJECTION INTRAVENOUS AS NEEDED
Status: DISCONTINUED | OUTPATIENT
Start: 2025-06-19 | End: 2025-06-19 | Stop reason: SURG

## 2025-06-19 RX ORDER — FAMOTIDINE 10 MG/ML
INJECTION, SOLUTION INTRAVENOUS AS NEEDED
Status: DISCONTINUED | OUTPATIENT
Start: 2025-06-19 | End: 2025-06-19 | Stop reason: SURG

## 2025-06-19 RX ORDER — ONDANSETRON 2 MG/ML
INJECTION INTRAMUSCULAR; INTRAVENOUS AS NEEDED
Status: DISCONTINUED | OUTPATIENT
Start: 2025-06-19 | End: 2025-06-19 | Stop reason: SURG

## 2025-06-19 RX ORDER — SODIUM CHLORIDE 0.9 % (FLUSH) 0.9 %
10 SYRINGE (ML) INJECTION EVERY 12 HOURS SCHEDULED
Status: DISCONTINUED | OUTPATIENT
Start: 2025-06-19 | End: 2025-06-19 | Stop reason: HOSPADM

## 2025-06-19 RX ORDER — SODIUM CHLORIDE, SODIUM LACTATE, POTASSIUM CHLORIDE, CALCIUM CHLORIDE 600; 310; 30; 20 MG/100ML; MG/100ML; MG/100ML; MG/100ML
125 INJECTION, SOLUTION INTRAVENOUS ONCE
Status: COMPLETED | OUTPATIENT
Start: 2025-06-19 | End: 2025-06-19

## 2025-06-19 RX ORDER — OXYCODONE AND ACETAMINOPHEN 5; 325 MG/1; MG/1
1 TABLET ORAL ONCE AS NEEDED
Status: DISCONTINUED | OUTPATIENT
Start: 2025-06-19 | End: 2025-06-19 | Stop reason: HOSPADM

## 2025-06-19 RX ORDER — MIDAZOLAM HYDROCHLORIDE 1 MG/ML
INJECTION, SOLUTION INTRAMUSCULAR; INTRAVENOUS AS NEEDED
Status: DISCONTINUED | OUTPATIENT
Start: 2025-06-19 | End: 2025-06-19 | Stop reason: SURG

## 2025-06-19 RX ORDER — HYDROCODONE BITARTRATE AND ACETAMINOPHEN 5; 325 MG/1; MG/1
1 TABLET ORAL EVERY 4 HOURS PRN
Qty: 10 TABLET | Refills: 0 | Status: SHIPPED | OUTPATIENT
Start: 2025-06-19

## 2025-06-19 RX ORDER — ROCURONIUM BROMIDE 10 MG/ML
INJECTION, SOLUTION INTRAVENOUS AS NEEDED
Status: DISCONTINUED | OUTPATIENT
Start: 2025-06-19 | End: 2025-06-19 | Stop reason: SURG

## 2025-06-19 RX ORDER — GLYCOPYRROLATE 0.2 MG/ML
INJECTION INTRAMUSCULAR; INTRAVENOUS AS NEEDED
Status: DISCONTINUED | OUTPATIENT
Start: 2025-06-19 | End: 2025-06-19 | Stop reason: SURG

## 2025-06-19 RX ORDER — BUPIVACAINE HYDROCHLORIDE 2.5 MG/ML
INJECTION, SOLUTION EPIDURAL; INFILTRATION; INTRACAUDAL; PERINEURAL AS NEEDED
Status: DISCONTINUED | OUTPATIENT
Start: 2025-06-19 | End: 2025-06-19 | Stop reason: HOSPADM

## 2025-06-19 RX ORDER — IBUPROFEN 600 MG/1
600 TABLET, FILM COATED ORAL EVERY 6 HOURS PRN
Qty: 30 TABLET | Refills: 0 | Status: SHIPPED | OUTPATIENT
Start: 2025-06-19 | End: 2025-07-19

## 2025-06-19 RX ORDER — PROPOFOL 10 MG/ML
INJECTION, EMULSION INTRAVENOUS AS NEEDED
Status: DISCONTINUED | OUTPATIENT
Start: 2025-06-19 | End: 2025-06-19 | Stop reason: SURG

## 2025-06-19 RX ORDER — DEXAMETHASONE SODIUM PHOSPHATE 4 MG/ML
INJECTION, SOLUTION INTRA-ARTICULAR; INTRALESIONAL; INTRAMUSCULAR; INTRAVENOUS; SOFT TISSUE AS NEEDED
Status: DISCONTINUED | OUTPATIENT
Start: 2025-06-19 | End: 2025-06-19 | Stop reason: SURG

## 2025-06-19 RX ORDER — MAGNESIUM HYDROXIDE 1200 MG/15ML
LIQUID ORAL AS NEEDED
Status: DISCONTINUED | OUTPATIENT
Start: 2025-06-19 | End: 2025-06-19 | Stop reason: HOSPADM

## 2025-06-19 RX ORDER — BUPIVACAINE HYDROCHLORIDE 5 MG/ML
INJECTION, SOLUTION PERINEURAL AS NEEDED
Status: DISCONTINUED | OUTPATIENT
Start: 2025-06-19 | End: 2025-06-19 | Stop reason: HOSPADM

## 2025-06-19 RX ORDER — ONDANSETRON 2 MG/ML
4 INJECTION INTRAMUSCULAR; INTRAVENOUS AS NEEDED
Status: DISCONTINUED | OUTPATIENT
Start: 2025-06-19 | End: 2025-06-19 | Stop reason: HOSPADM

## 2025-06-19 RX ORDER — SODIUM CHLORIDE 9 MG/ML
40 INJECTION, SOLUTION INTRAVENOUS AS NEEDED
Status: DISCONTINUED | OUTPATIENT
Start: 2025-06-19 | End: 2025-06-19 | Stop reason: HOSPADM

## 2025-06-19 RX ORDER — FENTANYL CITRATE 50 UG/ML
INJECTION, SOLUTION INTRAMUSCULAR; INTRAVENOUS AS NEEDED
Status: DISCONTINUED | OUTPATIENT
Start: 2025-06-19 | End: 2025-06-19 | Stop reason: SURG

## 2025-06-19 RX ORDER — MIDAZOLAM HYDROCHLORIDE 1 MG/ML
1 INJECTION, SOLUTION INTRAMUSCULAR; INTRAVENOUS
Status: DISCONTINUED | OUTPATIENT
Start: 2025-06-19 | End: 2025-06-19 | Stop reason: HOSPADM

## 2025-06-19 RX ORDER — LIDOCAINE HYDROCHLORIDE 20 MG/ML
INJECTION, SOLUTION EPIDURAL; INFILTRATION; INTRACAUDAL; PERINEURAL AS NEEDED
Status: DISCONTINUED | OUTPATIENT
Start: 2025-06-19 | End: 2025-06-19 | Stop reason: SURG

## 2025-06-19 RX ADMIN — ONDANSETRON 4 MG: 2 INJECTION INTRAMUSCULAR; INTRAVENOUS at 12:51

## 2025-06-19 RX ADMIN — FENTANYL CITRATE 100 MCG: 50 INJECTION INTRAMUSCULAR; INTRAVENOUS at 12:51

## 2025-06-19 RX ADMIN — GLYCOPYRROLATE 0.8 MG: 0.2 INJECTION INTRAMUSCULAR; INTRAVENOUS at 13:21

## 2025-06-19 RX ADMIN — DEXAMETHASONE SODIUM PHOSPHATE 4 MG: 4 INJECTION, SOLUTION INTRA-ARTICULAR; INTRALESIONAL; INTRAMUSCULAR; INTRAVENOUS; SOFT TISSUE at 12:57

## 2025-06-19 RX ADMIN — SODIUM CHLORIDE, POTASSIUM CHLORIDE, SODIUM LACTATE AND CALCIUM CHLORIDE: 600; 310; 30; 20 INJECTION, SOLUTION INTRAVENOUS at 12:51

## 2025-06-19 RX ADMIN — MIDAZOLAM HYDROCHLORIDE 2 MG: 1 INJECTION, SOLUTION INTRAMUSCULAR; INTRAVENOUS at 12:51

## 2025-06-19 RX ADMIN — PROPOFOL 150 MG: 10 INJECTION, EMULSION INTRAVENOUS at 12:57

## 2025-06-19 RX ADMIN — DESOGESTREL AND ETHINYL ESTRADIOL 4 MG: KIT at 13:21

## 2025-06-19 RX ADMIN — LIDOCAINE HYDROCHLORIDE 100 MG: 20 INJECTION, SOLUTION EPIDURAL; INFILTRATION; INTRACAUDAL; PERINEURAL at 12:57

## 2025-06-19 RX ADMIN — ROCURONIUM BROMIDE 25 MG: 10 SOLUTION INTRAVENOUS at 12:57

## 2025-06-19 RX ADMIN — CEFOXITIN 2000 MG: 2 INJECTION, POWDER, FOR SOLUTION INTRAVENOUS at 12:51

## 2025-06-19 RX ADMIN — FAMOTIDINE 20 MG: 10 INJECTION, SOLUTION INTRAVENOUS at 12:51

## 2025-06-19 NOTE — ANESTHESIA PROCEDURE NOTES
Airway  Reason: elective    Date/Time: 6/19/2025 12:57 PM  Airway not difficult    General Information and Staff    Patient location during procedure: OR  CRNA/CAA: Sharee Amaro CRNA    Indications and Patient Condition  Indications for airway management: airway protection    Preoxygenated: yes  MILS not maintained throughout    Mask difficulty assessment: 0 - not attempted    Final Airway Details    Final airway type: endotracheal airway      Successful airway: ETT  Cuffed: yes   Successful intubation technique: direct laryngoscopy  Adjuncts used in placement: cricoid pressure  Endotracheal tube insertion site: oral  Blade: Toure  Blade size: 2  ETT size (mm): 7.0  Cormack-Lehane Classification: grade I - full view of glottis  Placement verified by: chest auscultation and capnometry   Measured from: lips  ETT/EBT  to lips (cm): 20  Number of attempts at approach: 1  Assessment: lips, teeth, and gum same as pre-op and atraumatic intubation    Additional Comments  Atraumatic ETT placement, dentition unchanged.

## 2025-06-19 NOTE — OP NOTE
SALPINGO OOPHORECTOMY LAPAROSCOPIC  Procedure Note    Iraida Heard  6/19/2025    Pre-op Diagnosis:   Pelvic pain  Left ovarian cyst    Post-op Diagnosis:     Pelvic pain  Left ovarian cyst    Procedure(s):  Laparoscopic left salpingo-oophorectomy    Surgeon(s):  Carlos Sebastain DO    Anesthesia: General    Estimated Blood Loss: minimal    Specimens:  Order Name Source Comment Collection Info Order Time   TISSUE EXAM, [facility] ([facility]) Ovary, Left with Fallopian Tube  Collected By: Carlos Sebastian DO 6/19/2025  1:14 PM     Release to patient   Routine Release              Procedure:  The patient was taken to the operating room, given general anesthesia, prepped and draped in the usual sterile fashion.  The bladder was drained with a straight catheter.  A speculum was placed into the vagina and a uterine manipulator was placed.  The surgeon was regloved and attention made to the abdomen.      A one centimeter incision was made into the umbilicus and an OPTIVIEW trocar was placed into the abdomen under direct visualization using the laparoscope.  The abdomen was filled with CO2 gas up to 15mm mercury pressure.  A second 5 millimeter trocar was placed in the left lower quadrant.  A third 15 mm trocar was placed in the midline.  The pelvic contents were visualized and the fallopian tubes were isolated and bathed with marcaine.  We picked up the left fallopian tube and cauterized under the infundibulopelvic ligament using the Enseal device and then removed the remainder of the attachments of the ovary freeing the ovary.  We then placed an Endobag and removed it through the 15 mm port.  We then closed the 15 mm port using a Carlos Obrien device with 2 stitches of 0 Vicryl.    We then surveyed the remainder of the abdomen.  The pelvis was completely free of adhesions to the liver edge and everything else looked completely normal.    We removed the CO2 gas and trocars.  The skin incisions were  closed with a 4-0 monocryl and surgical adhesive.      Sponge, lap and needle counts were correct.       Findings: The left ovary appeared a little bit irregular but nothing concerning for malignancy.  There is a left ovarian cyst.    Complications: none    Grafts or Implants: NA    Carlos Sebastian DO     Date: 6/19/2025  Time: 13:42 EDT

## 2025-06-19 NOTE — ANESTHESIA POSTPROCEDURE EVALUATION
Patient: Iraida Heard    Procedure Summary       Date: 06/19/25 Room / Location: [facility] OR 03 /  [facility] OR    Anesthesia Start: 1251 Anesthesia Stop: 1333    Procedure: LEFT SALPINGO OOPHORECTOMY LAPAROSCOPIC (Left: Vagina) Diagnosis: (N83.202)    Surgeons: Carlos Sebastian DO Provider: Anthony Marquez MD    Anesthesia Type: general ASA Status: 2            Anesthesia Type: general    Vitals  Vitals Value Taken Time   /82 06/19/25 13:59   Temp 97.4 °F (36.3 °C) 06/19/25 13:34   Pulse 75 06/19/25 14:02   Resp 16 06/19/25 13:59   SpO2 97 % 06/19/25 14:26   Vitals shown include unfiled device data.        Post Anesthesia Care and Evaluation    Patient location during evaluation: PACU  Patient participation: complete - patient participated  Level of consciousness: awake  Pain score: 2  Pain management: adequate    Airway patency: patent  Anesthetic complications: No anesthetic complications  PONV Status: controlled  Cardiovascular status: acceptable and blood pressure returned to baseline  Respiratory status: acceptable and room air  Hydration status: acceptable    Comments: Patient comfortable with discharge at this time.

## 2025-06-19 NOTE — ANESTHESIA PREPROCEDURE EVALUATION
Anesthesia Evaluation     history of anesthetic complications:  PONV  NPO Solid Status: > 8 hours  NPO Liquid Status: > 8 hours           Airway   Mallampati: I  TM distance: >3 FB  Neck ROM: full  No difficulty expected  Dental - normal exam     Pulmonary - normal exam   (+) ,sleep apnea  Cardiovascular - normal exam    (+) hypertension, hyperlipidemia      Neuro/Psych  (+) psychiatric history  GI/Hepatic/Renal/Endo    (+) GERD, diabetes mellitus    Musculoskeletal     Abdominal  - normal exam    Bowel sounds: normal.   Substance History      OB/GYN          Other   arthritis,                   Anesthesia Plan    ASA 2     general     intravenous induction     Anesthetic plan, risks, benefits, and alternatives have been provided, discussed and informed consent has been obtained with: patient.      CODE STATUS:

## 2025-06-23 LAB — REF LAB TEST METHOD: NORMAL

## 2025-07-07 ENCOUNTER — LAB (OUTPATIENT)
Dept: FAMILY MEDICINE CLINIC | Facility: CLINIC | Age: 50
End: 2025-07-07
Payer: MEDICARE

## 2025-07-07 DIAGNOSIS — R74.8 ELEVATED LIVER ENZYMES: ICD-10-CM

## 2025-07-07 DIAGNOSIS — G47.33 OSA (OBSTRUCTIVE SLEEP APNEA): ICD-10-CM

## 2025-07-07 DIAGNOSIS — R10.32 LLQ PAIN: ICD-10-CM

## 2025-07-07 DIAGNOSIS — I10 ESSENTIAL HYPERTENSION: ICD-10-CM

## 2025-07-07 DIAGNOSIS — K21.9 GASTROESOPHAGEAL REFLUX DISEASE WITHOUT ESOPHAGITIS: ICD-10-CM

## 2025-07-07 DIAGNOSIS — K59.09 OTHER CONSTIPATION: ICD-10-CM

## 2025-07-07 DIAGNOSIS — E11.65 TYPE 2 DIABETES MELLITUS WITH HYPERGLYCEMIA, WITHOUT LONG-TERM CURRENT USE OF INSULIN: ICD-10-CM

## 2025-07-07 DIAGNOSIS — E78.2 MIXED HYPERLIPIDEMIA: ICD-10-CM

## 2025-07-07 DIAGNOSIS — F41.0 PANIC ATTACKS: ICD-10-CM

## 2025-07-07 LAB
ALBUMIN SERPL-MCNC: 4.3 G/DL (ref 3.5–5.2)
ALBUMIN UR-MCNC: <1.2 MG/DL
ALBUMIN/GLOB SERPL: 1.4 G/DL
ALP SERPL-CCNC: 68 U/L (ref 39–117)
ALT SERPL W P-5'-P-CCNC: 27 U/L (ref 1–33)
ANION GAP SERPL CALCULATED.3IONS-SCNC: 10 MMOL/L (ref 5–15)
AST SERPL-CCNC: 28 U/L (ref 1–32)
BASOPHILS # BLD AUTO: 0.04 10*3/MM3 (ref 0–0.2)
BASOPHILS NFR BLD AUTO: 0.4 % (ref 0–1.5)
BILIRUB SERPL-MCNC: 0.3 MG/DL (ref 0–1.2)
BUN SERPL-MCNC: 15 MG/DL (ref 6–20)
BUN/CREAT SERPL: 15 (ref 7–25)
CALCIUM SPEC-SCNC: 9.1 MG/DL (ref 8.6–10.5)
CHLORIDE SERPL-SCNC: 104 MMOL/L (ref 98–107)
CHOLEST SERPL-MCNC: 125 MG/DL (ref 0–200)
CO2 SERPL-SCNC: 23 MMOL/L (ref 22–29)
CREAT SERPL-MCNC: 1 MG/DL (ref 0.57–1)
CREAT UR-MCNC: 206.3 MG/DL
DEPRECATED RDW RBC AUTO: 45.9 FL (ref 37–54)
EGFRCR SERPLBLD CKD-EPI 2021: 69.2 ML/MIN/1.73
EOSINOPHIL # BLD AUTO: 0.29 10*3/MM3 (ref 0–0.4)
EOSINOPHIL NFR BLD AUTO: 3 % (ref 0.3–6.2)
ERYTHROCYTE [DISTWIDTH] IN BLOOD BY AUTOMATED COUNT: 12.9 % (ref 12.3–15.4)
GLOBULIN UR ELPH-MCNC: 3 GM/DL
GLUCOSE SERPL-MCNC: 191 MG/DL (ref 65–99)
HBA1C MFR BLD: 8.5 % (ref 4.8–5.6)
HCT VFR BLD AUTO: 46.8 % (ref 34–46.6)
HDLC SERPL-MCNC: 38 MG/DL (ref 40–60)
HGB BLD-MCNC: 15.2 G/DL (ref 12–15.9)
IMM GRANULOCYTES # BLD AUTO: 0.04 10*3/MM3 (ref 0–0.05)
IMM GRANULOCYTES NFR BLD AUTO: 0.4 % (ref 0–0.5)
LDLC SERPL CALC-MCNC: 65 MG/DL (ref 0–100)
LDLC/HDLC SERPL: 1.63 {RATIO}
LYMPHOCYTES # BLD AUTO: 3.11 10*3/MM3 (ref 0.7–3.1)
LYMPHOCYTES NFR BLD AUTO: 31.9 % (ref 19.6–45.3)
MCH RBC QN AUTO: 31.9 PG (ref 26.6–33)
MCHC RBC AUTO-ENTMCNC: 32.5 G/DL (ref 31.5–35.7)
MCV RBC AUTO: 98.1 FL (ref 79–97)
MICROALBUMIN/CREAT UR: NORMAL MG/G{CREAT}
MONOCYTES # BLD AUTO: 0.88 10*3/MM3 (ref 0.1–0.9)
MONOCYTES NFR BLD AUTO: 9 % (ref 5–12)
NEUTROPHILS NFR BLD AUTO: 5.39 10*3/MM3 (ref 1.7–7)
NEUTROPHILS NFR BLD AUTO: 55.3 % (ref 42.7–76)
NRBC BLD AUTO-RTO: 0 /100 WBC (ref 0–0.2)
PLATELET # BLD AUTO: 262 10*3/MM3 (ref 140–450)
PMV BLD AUTO: 11.1 FL (ref 6–12)
POTASSIUM SERPL-SCNC: 5 MMOL/L (ref 3.5–5.2)
PROT SERPL-MCNC: 7.3 G/DL (ref 6–8.5)
RBC # BLD AUTO: 4.77 10*6/MM3 (ref 3.77–5.28)
SODIUM SERPL-SCNC: 137 MMOL/L (ref 136–145)
TRIGL SERPL-MCNC: 125 MG/DL (ref 0–150)
TSH SERPL DL<=0.05 MIU/L-ACNC: 2.07 UIU/ML (ref 0.27–4.2)
VLDLC SERPL-MCNC: 22 MG/DL (ref 5–40)
WBC NRBC COR # BLD AUTO: 9.75 10*3/MM3 (ref 3.4–10.8)

## 2025-07-07 PROCEDURE — 36415 COLL VENOUS BLD VENIPUNCTURE: CPT | Performed by: GENERAL PRACTICE

## 2025-07-07 PROCEDURE — 82570 ASSAY OF URINE CREATININE: CPT | Performed by: GENERAL PRACTICE

## 2025-07-07 PROCEDURE — 80061 LIPID PANEL: CPT | Performed by: GENERAL PRACTICE

## 2025-07-07 PROCEDURE — 85025 COMPLETE CBC W/AUTO DIFF WBC: CPT | Performed by: GENERAL PRACTICE

## 2025-07-07 PROCEDURE — 84443 ASSAY THYROID STIM HORMONE: CPT | Performed by: GENERAL PRACTICE

## 2025-07-07 PROCEDURE — 82043 UR ALBUMIN QUANTITATIVE: CPT | Performed by: GENERAL PRACTICE

## 2025-07-07 PROCEDURE — 80053 COMPREHEN METABOLIC PANEL: CPT | Performed by: GENERAL PRACTICE

## 2025-07-07 PROCEDURE — 83036 HEMOGLOBIN GLYCOSYLATED A1C: CPT | Performed by: GENERAL PRACTICE

## 2025-08-27 DIAGNOSIS — J30.9 CHRONIC ALLERGIC RHINITIS: ICD-10-CM

## 2025-08-27 RX ORDER — DICYCLOMINE HCL 20 MG
20 TABLET ORAL 2 TIMES DAILY
Qty: 180 TABLET | Refills: 3 | Status: SHIPPED | OUTPATIENT
Start: 2025-08-27

## 2025-08-27 RX ORDER — MONTELUKAST SODIUM 10 MG/1
10 TABLET ORAL DAILY
Qty: 90 TABLET | Refills: 3 | Status: SHIPPED | OUTPATIENT
Start: 2025-08-27

## (undated) DEVICE — ENSEAL X1 TISSUE SEALER, CURVED JAW, 37 CM SHAFT LENGTH: Brand: ENSEAL

## (undated) DEVICE — UNDYED BRAIDED (POLYGLACTIN 910), SYNTHETIC ABSORBABLE SUTURE: Brand: COATED VICRYL

## (undated) DEVICE — COR GYN LAPAROSCOPY: Brand: MEDLINE INDUSTRIES, INC.

## (undated) DEVICE — ENDOPATH XCEL UNIVERSAL TROCAR STABLILITY SLEEVES: Brand: ENDOPATH XCEL

## (undated) DEVICE — MONOPOLAR METZENBAUM SCISSOR TIP, DISPOSABLE: Brand: MONOPOLAR METZENBAUM SCISSOR TIP, DISPOSABLE

## (undated) DEVICE — SUT MNCRYL 4/0 PS2 18 IN

## (undated) DEVICE — PATIENT RETURN ELECTRODE, SINGLE-USE, CONTACT QUALITY MONITORING, ADULT, WITH 9FT CORD, FOR PATIENTS WEIGING OVER 33LBS. (15KG): Brand: MEGADYNE

## (undated) DEVICE — SYS CLOSE PORTII CARTR/THOMASN XL

## (undated) DEVICE — 40595 XL TRENDELENBURG POSITIONING KIT: Brand: 40595 XL TRENDELENBURG POSITIONING KIT

## (undated) DEVICE — ENDOPATH XCEL BLADELESS TROCARS WITH STABILITY SLEEVES: Brand: ENDOPATH XCEL

## (undated) DEVICE — ENDOPOUCH RETRIEVER SPECIMEN RETRIEVAL BAGS: Brand: ENDOPOUCH RETRIEVER

## (undated) DEVICE — ADHS SKIN PREMIERPRO EXOFIN TOPICAL HI/VISC .5ML

## (undated) DEVICE — GLV SURG PREMIERPRO MIC LTX PF SZ8 BRN